# Patient Record
Sex: MALE | Race: WHITE | NOT HISPANIC OR LATINO | Employment: OTHER | URBAN - METROPOLITAN AREA
[De-identification: names, ages, dates, MRNs, and addresses within clinical notes are randomized per-mention and may not be internally consistent; named-entity substitution may affect disease eponyms.]

---

## 2017-03-15 ENCOUNTER — GENERIC CONVERSION - ENCOUNTER (OUTPATIENT)
Dept: OTHER | Facility: OTHER | Age: 47
End: 2017-03-15

## 2017-06-28 ENCOUNTER — APPOINTMENT (EMERGENCY)
Dept: RADIOLOGY | Facility: HOSPITAL | Age: 47
DRG: 192 | End: 2017-06-28
Payer: COMMERCIAL

## 2017-06-28 ENCOUNTER — GENERIC CONVERSION - ENCOUNTER (OUTPATIENT)
Dept: OTHER | Facility: OTHER | Age: 47
End: 2017-06-28

## 2017-06-28 ENCOUNTER — HOSPITAL ENCOUNTER (INPATIENT)
Facility: HOSPITAL | Age: 47
LOS: 1 days | Discharge: LEFT BEFORE DISCHARGE | DRG: 192 | End: 2017-06-29
Attending: EMERGENCY MEDICINE | Admitting: INTERNAL MEDICINE
Payer: COMMERCIAL

## 2017-06-28 DIAGNOSIS — R07.9 CHEST PAIN: Primary | ICD-10-CM

## 2017-06-28 DIAGNOSIS — I50.9 CHF (CONGESTIVE HEART FAILURE) (HCC): ICD-10-CM

## 2017-06-28 PROBLEM — F10.10 ALCOHOL ABUSE: Status: ACTIVE | Noted: 2017-06-28

## 2017-06-28 PROBLEM — I10 HTN (HYPERTENSION): Status: ACTIVE | Noted: 2017-06-28

## 2017-06-28 PROBLEM — Z72.0 TOBACCO ABUSE: Status: ACTIVE | Noted: 2017-06-28

## 2017-06-28 LAB
ALBUMIN SERPL BCP-MCNC: 3.7 G/DL (ref 3.5–5)
ALP SERPL-CCNC: 62 U/L (ref 46–116)
ALT SERPL W P-5'-P-CCNC: 18 U/L (ref 12–78)
ANION GAP SERPL CALCULATED.3IONS-SCNC: 7 MMOL/L (ref 4–13)
ANION GAP SERPL CALCULATED.3IONS-SCNC: 7 MMOL/L (ref 4–13)
AST SERPL W P-5'-P-CCNC: 18 U/L (ref 5–45)
BASOPHILS # BLD AUTO: 0.03 THOUSANDS/ΜL (ref 0–0.1)
BASOPHILS NFR BLD AUTO: 0 % (ref 0–1)
BILIRUB SERPL-MCNC: 0.83 MG/DL (ref 0.2–1)
BUN SERPL-MCNC: 14 MG/DL (ref 5–25)
BUN SERPL-MCNC: 15 MG/DL (ref 5–25)
CALCIUM SERPL-MCNC: 8.9 MG/DL (ref 8.3–10.1)
CALCIUM SERPL-MCNC: 9 MG/DL (ref 8.3–10.1)
CHLORIDE SERPL-SCNC: 103 MMOL/L (ref 100–108)
CHLORIDE SERPL-SCNC: 104 MMOL/L (ref 100–108)
CHOLEST SERPL-MCNC: 184 MG/DL (ref 50–200)
CO2 SERPL-SCNC: 26 MMOL/L (ref 21–32)
CO2 SERPL-SCNC: 26 MMOL/L (ref 21–32)
CREAT SERPL-MCNC: 1.03 MG/DL (ref 0.6–1.3)
CREAT SERPL-MCNC: 1.17 MG/DL (ref 0.6–1.3)
EOSINOPHIL # BLD AUTO: 0.12 THOUSAND/ΜL (ref 0–0.61)
EOSINOPHIL NFR BLD AUTO: 1 % (ref 0–6)
ERYTHROCYTE [DISTWIDTH] IN BLOOD BY AUTOMATED COUNT: 14.2 % (ref 11.6–15.1)
ERYTHROCYTE [DISTWIDTH] IN BLOOD BY AUTOMATED COUNT: 14.2 % (ref 11.6–15.1)
GFR SERPL CREATININE-BSD FRML MDRD: >60 ML/MIN/1.73SQ M
GFR SERPL CREATININE-BSD FRML MDRD: >60 ML/MIN/1.73SQ M
GLUCOSE SERPL-MCNC: 148 MG/DL (ref 65–140)
GLUCOSE SERPL-MCNC: 89 MG/DL (ref 65–140)
HCT VFR BLD AUTO: 43.5 % (ref 36.5–49.3)
HCT VFR BLD AUTO: 47 % (ref 36.5–49.3)
HDLC SERPL-MCNC: 54 MG/DL (ref 40–60)
HGB BLD-MCNC: 15 G/DL (ref 12–17)
HGB BLD-MCNC: 16.4 G/DL (ref 12–17)
HOLD SPECIMEN: NORMAL
LDLC SERPL CALC-MCNC: 104 MG/DL (ref 0–100)
LYMPHOCYTES # BLD AUTO: 2.18 THOUSANDS/ΜL (ref 0.6–4.47)
LYMPHOCYTES NFR BLD AUTO: 25 % (ref 14–44)
MAGNESIUM SERPL-MCNC: 2 MG/DL (ref 1.6–2.6)
MCH RBC QN AUTO: 31.5 PG (ref 26.8–34.3)
MCH RBC QN AUTO: 31.5 PG (ref 26.8–34.3)
MCHC RBC AUTO-ENTMCNC: 34.5 G/DL (ref 31.4–37.4)
MCHC RBC AUTO-ENTMCNC: 34.9 G/DL (ref 31.4–37.4)
MCV RBC AUTO: 90 FL (ref 82–98)
MCV RBC AUTO: 91 FL (ref 82–98)
MONOCYTES # BLD AUTO: 0.85 THOUSAND/ΜL (ref 0.17–1.22)
MONOCYTES NFR BLD AUTO: 10 % (ref 4–12)
NEUTROPHILS # BLD AUTO: 5.6 THOUSANDS/ΜL (ref 1.85–7.62)
NEUTS SEG NFR BLD AUTO: 64 % (ref 43–75)
NRBC BLD AUTO-RTO: 0 /100 WBCS
NT-PROBNP SERPL-MCNC: 3463 PG/ML
PLATELET # BLD AUTO: 286 THOUSANDS/UL (ref 149–390)
PLATELET # BLD AUTO: 312 THOUSANDS/UL (ref 149–390)
PMV BLD AUTO: 10.2 FL (ref 8.9–12.7)
PMV BLD AUTO: 10.4 FL (ref 8.9–12.7)
POTASSIUM SERPL-SCNC: 3.7 MMOL/L (ref 3.5–5.3)
POTASSIUM SERPL-SCNC: 3.8 MMOL/L (ref 3.5–5.3)
PROT SERPL-MCNC: 7.1 G/DL (ref 6.4–8.2)
RBC # BLD AUTO: 4.76 MILLION/UL (ref 3.88–5.62)
RBC # BLD AUTO: 5.2 MILLION/UL (ref 3.88–5.62)
SODIUM SERPL-SCNC: 136 MMOL/L (ref 136–145)
SODIUM SERPL-SCNC: 137 MMOL/L (ref 136–145)
SPECIMEN SOURCE: NORMAL
TRIGL SERPL-MCNC: 131 MG/DL
TROPONIN I BLD-MCNC: 0.02 NG/ML (ref 0–0.08)
TROPONIN I SERPL-MCNC: <0.02 NG/ML
TSH SERPL DL<=0.05 MIU/L-ACNC: 3.81 UIU/ML (ref 0.36–3.74)
WBC # BLD AUTO: 7.29 THOUSAND/UL (ref 4.31–10.16)
WBC # BLD AUTO: 8.81 THOUSAND/UL (ref 4.31–10.16)

## 2017-06-28 PROCEDURE — 94760 N-INVAS EAR/PLS OXIMETRY 1: CPT

## 2017-06-28 PROCEDURE — 80053 COMPREHEN METABOLIC PANEL: CPT | Performed by: EMERGENCY MEDICINE

## 2017-06-28 PROCEDURE — 85025 COMPLETE CBC W/AUTO DIFF WBC: CPT | Performed by: EMERGENCY MEDICINE

## 2017-06-28 PROCEDURE — 94660 CPAP INITIATION&MGMT: CPT

## 2017-06-28 PROCEDURE — 83880 ASSAY OF NATRIURETIC PEPTIDE: CPT | Performed by: EMERGENCY MEDICINE

## 2017-06-28 PROCEDURE — 80048 BASIC METABOLIC PNL TOTAL CA: CPT | Performed by: INTERNAL MEDICINE

## 2017-06-28 PROCEDURE — 84484 ASSAY OF TROPONIN QUANT: CPT | Performed by: INTERNAL MEDICINE

## 2017-06-28 PROCEDURE — 80061 LIPID PANEL: CPT | Performed by: INTERNAL MEDICINE

## 2017-06-28 PROCEDURE — 36415 COLL VENOUS BLD VENIPUNCTURE: CPT | Performed by: INTERNAL MEDICINE

## 2017-06-28 PROCEDURE — 71020 HB CHEST X-RAY 2VW FRONTAL&LATL: CPT

## 2017-06-28 PROCEDURE — 84484 ASSAY OF TROPONIN QUANT: CPT

## 2017-06-28 PROCEDURE — 93005 ELECTROCARDIOGRAM TRACING: CPT | Performed by: EMERGENCY MEDICINE

## 2017-06-28 PROCEDURE — 83735 ASSAY OF MAGNESIUM: CPT | Performed by: INTERNAL MEDICINE

## 2017-06-28 PROCEDURE — 85027 COMPLETE CBC AUTOMATED: CPT | Performed by: INTERNAL MEDICINE

## 2017-06-28 PROCEDURE — 36415 COLL VENOUS BLD VENIPUNCTURE: CPT

## 2017-06-28 PROCEDURE — 99285 EMERGENCY DEPT VISIT HI MDM: CPT

## 2017-06-28 PROCEDURE — 84443 ASSAY THYROID STIM HORMONE: CPT | Performed by: INTERNAL MEDICINE

## 2017-06-28 RX ORDER — FUROSEMIDE 40 MG/1
40 TABLET ORAL 2 TIMES DAILY
COMMUNITY
End: 2020-10-03 | Stop reason: HOSPADM

## 2017-06-28 RX ORDER — CALCIUM CARBONATE 200(500)MG
1000 TABLET,CHEWABLE ORAL DAILY PRN
Status: DISCONTINUED | OUTPATIENT
Start: 2017-06-28 | End: 2017-06-29 | Stop reason: HOSPADM

## 2017-06-28 RX ORDER — ASPIRIN 81 MG/1
81 TABLET, CHEWABLE ORAL DAILY
Status: DISCONTINUED | OUTPATIENT
Start: 2017-06-29 | End: 2017-06-29 | Stop reason: HOSPADM

## 2017-06-28 RX ORDER — THIAMINE MONONITRATE (VIT B1) 100 MG
100 TABLET ORAL DAILY
Status: DISCONTINUED | OUTPATIENT
Start: 2017-06-29 | End: 2017-06-29 | Stop reason: HOSPADM

## 2017-06-28 RX ORDER — KETOROLAC TROMETHAMINE 30 MG/ML
15 INJECTION, SOLUTION INTRAMUSCULAR; INTRAVENOUS ONCE
Status: DISCONTINUED | OUTPATIENT
Start: 2017-06-28 | End: 2017-06-28

## 2017-06-28 RX ORDER — ALBUTEROL SULFATE 90 UG/1
2 AEROSOL, METERED RESPIRATORY (INHALATION) EVERY 4 HOURS PRN
Status: DISCONTINUED | OUTPATIENT
Start: 2017-06-28 | End: 2017-06-29 | Stop reason: HOSPADM

## 2017-06-28 RX ORDER — FLUTICASONE PROPIONATE 110 UG/1
1 AEROSOL, METERED RESPIRATORY (INHALATION)
Status: DISCONTINUED | OUTPATIENT
Start: 2017-06-28 | End: 2017-06-29 | Stop reason: HOSPADM

## 2017-06-28 RX ORDER — LORAZEPAM 0.5 MG/1
0.5 TABLET ORAL EVERY 6 HOURS PRN
Status: DISCONTINUED | OUTPATIENT
Start: 2017-06-28 | End: 2017-06-29 | Stop reason: HOSPADM

## 2017-06-28 RX ORDER — ACETAMINOPHEN 325 MG/1
650 TABLET ORAL ONCE
Status: COMPLETED | OUTPATIENT
Start: 2017-06-28 | End: 2017-06-28

## 2017-06-28 RX ORDER — FOLIC ACID 1 MG/1
1 TABLET ORAL DAILY
Status: DISCONTINUED | OUTPATIENT
Start: 2017-06-29 | End: 2017-06-29 | Stop reason: HOSPADM

## 2017-06-28 RX ORDER — ACETAMINOPHEN 325 MG/1
650 TABLET ORAL EVERY 6 HOURS PRN
Status: DISCONTINUED | OUTPATIENT
Start: 2017-06-28 | End: 2017-06-29 | Stop reason: HOSPADM

## 2017-06-28 RX ORDER — DOCUSATE SODIUM 100 MG/1
100 CAPSULE, LIQUID FILLED ORAL 2 TIMES DAILY PRN
Status: DISCONTINUED | OUTPATIENT
Start: 2017-06-28 | End: 2017-06-29 | Stop reason: HOSPADM

## 2017-06-28 RX ORDER — METOPROLOL SUCCINATE 25 MG/1
25 TABLET, EXTENDED RELEASE ORAL DAILY
Status: DISCONTINUED | OUTPATIENT
Start: 2017-06-29 | End: 2017-06-29 | Stop reason: HOSPADM

## 2017-06-28 RX ORDER — FUROSEMIDE 40 MG/1
40 TABLET ORAL DAILY
Status: DISCONTINUED | OUTPATIENT
Start: 2017-06-29 | End: 2017-06-29

## 2017-06-28 RX ORDER — ONDANSETRON 2 MG/ML
4 INJECTION INTRAMUSCULAR; INTRAVENOUS EVERY 6 HOURS PRN
Status: DISCONTINUED | OUTPATIENT
Start: 2017-06-28 | End: 2017-06-29 | Stop reason: HOSPADM

## 2017-06-28 RX ORDER — METOPROLOL SUCCINATE 25 MG/1
25 TABLET, EXTENDED RELEASE ORAL DAILY
Status: ON HOLD | COMMUNITY
End: 2017-08-11

## 2017-06-28 RX ORDER — ASPIRIN 81 MG/1
325 TABLET, CHEWABLE ORAL ONCE
Status: DISCONTINUED | OUTPATIENT
Start: 2017-06-28 | End: 2017-06-28

## 2017-06-28 RX ORDER — ASPIRIN 81 MG/1
81 TABLET, CHEWABLE ORAL DAILY
COMMUNITY
End: 2018-03-19 | Stop reason: SDUPTHER

## 2017-06-28 RX ADMIN — FLUTICASONE PROPIONATE 1 PUFF: 110 AEROSOL, METERED RESPIRATORY (INHALATION) at 21:55

## 2017-06-28 RX ADMIN — ACETAMINOPHEN 650 MG: 325 TABLET, FILM COATED ORAL at 15:54

## 2017-06-29 ENCOUNTER — GENERIC CONVERSION - ENCOUNTER (OUTPATIENT)
Dept: OTHER | Facility: OTHER | Age: 47
End: 2017-06-29

## 2017-06-29 ENCOUNTER — APPOINTMENT (INPATIENT)
Dept: NON INVASIVE DIAGNOSTICS | Facility: HOSPITAL | Age: 47
DRG: 192 | End: 2017-06-29
Payer: COMMERCIAL

## 2017-06-29 ENCOUNTER — APPOINTMENT (INPATIENT)
Dept: RADIOLOGY | Facility: HOSPITAL | Age: 47
DRG: 192 | End: 2017-06-29
Payer: COMMERCIAL

## 2017-06-29 VITALS
BODY MASS INDEX: 30.3 KG/M2 | WEIGHT: 228.62 LBS | SYSTOLIC BLOOD PRESSURE: 100 MMHG | OXYGEN SATURATION: 94 % | DIASTOLIC BLOOD PRESSURE: 60 MMHG | TEMPERATURE: 97.6 F | HEIGHT: 73 IN | HEART RATE: 72 BPM | RESPIRATION RATE: 18 BRPM

## 2017-06-29 PROBLEM — R07.9 CHEST PAIN: Status: RESOLVED | Noted: 2017-06-28 | Resolved: 2017-06-29

## 2017-06-29 LAB
ALBUMIN SERPL BCP-MCNC: 3.2 G/DL (ref 3.5–5)
ALP SERPL-CCNC: 53 U/L (ref 46–116)
ALT SERPL W P-5'-P-CCNC: 17 U/L (ref 12–78)
ANION GAP SERPL CALCULATED.3IONS-SCNC: 6 MMOL/L (ref 4–13)
AST SERPL W P-5'-P-CCNC: 16 U/L (ref 5–45)
ATRIAL RATE: 76 BPM
BILIRUB SERPL-MCNC: 0.56 MG/DL (ref 0.2–1)
BUN SERPL-MCNC: 15 MG/DL (ref 5–25)
CALCIUM SERPL-MCNC: 9 MG/DL (ref 8.3–10.1)
CHLORIDE SERPL-SCNC: 107 MMOL/L (ref 100–108)
CO2 SERPL-SCNC: 25 MMOL/L (ref 21–32)
CREAT SERPL-MCNC: 0.9 MG/DL (ref 0.6–1.3)
ERYTHROCYTE [DISTWIDTH] IN BLOOD BY AUTOMATED COUNT: 14.5 % (ref 11.6–15.1)
EST. AVERAGE GLUCOSE BLD GHB EST-MCNC: 111 MG/DL
GFR SERPL CREATININE-BSD FRML MDRD: >60 ML/MIN/1.73SQ M
GLUCOSE SERPL-MCNC: 100 MG/DL (ref 65–140)
HBA1C MFR BLD: 5.5 % (ref 4.2–6.3)
HCT VFR BLD AUTO: 43.6 % (ref 36.5–49.3)
HGB BLD-MCNC: 15.2 G/DL (ref 12–17)
MAGNESIUM SERPL-MCNC: 2.1 MG/DL (ref 1.6–2.6)
MCH RBC QN AUTO: 32 PG (ref 26.8–34.3)
MCHC RBC AUTO-ENTMCNC: 34.9 G/DL (ref 31.4–37.4)
MCV RBC AUTO: 92 FL (ref 82–98)
P AXIS: 62 DEGREES
PLATELET # BLD AUTO: 290 THOUSANDS/UL (ref 149–390)
PMV BLD AUTO: 11.3 FL (ref 8.9–12.7)
POTASSIUM SERPL-SCNC: 4.2 MMOL/L (ref 3.5–5.3)
PR INTERVAL: 170 MS
PROT SERPL-MCNC: 6.3 G/DL (ref 6.4–8.2)
QRS AXIS: 58 DEGREES
QRSD INTERVAL: 164 MS
QT INTERVAL: 442 MS
QTC INTERVAL: 497 MS
RBC # BLD AUTO: 4.75 MILLION/UL (ref 3.88–5.62)
SODIUM SERPL-SCNC: 138 MMOL/L (ref 136–145)
T WAVE AXIS: 198 DEGREES
VENTRICULAR RATE: 76 BPM
WBC # BLD AUTO: 7.1 THOUSAND/UL (ref 4.31–10.16)

## 2017-06-29 PROCEDURE — 80053 COMPREHEN METABOLIC PANEL: CPT | Performed by: INTERNAL MEDICINE

## 2017-06-29 PROCEDURE — 76705 ECHO EXAM OF ABDOMEN: CPT

## 2017-06-29 PROCEDURE — B2111ZZ FLUOROSCOPY OF MULTIPLE CORONARY ARTERIES USING LOW OSMOLAR CONTRAST: ICD-10-PCS | Performed by: INTERNAL MEDICINE

## 2017-06-29 PROCEDURE — C1894 INTRO/SHEATH, NON-LASER: HCPCS | Performed by: INTERNAL MEDICINE

## 2017-06-29 PROCEDURE — 83036 HEMOGLOBIN GLYCOSYLATED A1C: CPT | Performed by: INTERNAL MEDICINE

## 2017-06-29 PROCEDURE — C1769 GUIDE WIRE: HCPCS | Performed by: INTERNAL MEDICINE

## 2017-06-29 PROCEDURE — 85027 COMPLETE CBC AUTOMATED: CPT | Performed by: INTERNAL MEDICINE

## 2017-06-29 PROCEDURE — 4A023N7 MEASUREMENT OF CARDIAC SAMPLING AND PRESSURE, LEFT HEART, PERCUTANEOUS APPROACH: ICD-10-PCS | Performed by: INTERNAL MEDICINE

## 2017-06-29 PROCEDURE — 83735 ASSAY OF MAGNESIUM: CPT | Performed by: INTERNAL MEDICINE

## 2017-06-29 PROCEDURE — 93458 L HRT ARTERY/VENTRICLE ANGIO: CPT | Performed by: INTERNAL MEDICINE

## 2017-06-29 PROCEDURE — 99152 MOD SED SAME PHYS/QHP 5/>YRS: CPT | Performed by: INTERNAL MEDICINE

## 2017-06-29 RX ORDER — HEPARIN SODIUM 1000 [USP'U]/ML
INJECTION, SOLUTION INTRAVENOUS; SUBCUTANEOUS CODE/TRAUMA/SEDATION MEDICATION
Status: COMPLETED | OUTPATIENT
Start: 2017-06-29 | End: 2017-06-29

## 2017-06-29 RX ORDER — LIDOCAINE HYDROCHLORIDE 10 MG/ML
INJECTION, SOLUTION INFILTRATION; PERINEURAL CODE/TRAUMA/SEDATION MEDICATION
Status: COMPLETED | OUTPATIENT
Start: 2017-06-29 | End: 2017-06-29

## 2017-06-29 RX ORDER — SODIUM CHLORIDE 9 MG/ML
100 INJECTION, SOLUTION INTRAVENOUS CONTINUOUS
Status: DISCONTINUED | OUTPATIENT
Start: 2017-06-29 | End: 2017-06-29 | Stop reason: HOSPADM

## 2017-06-29 RX ORDER — VERAPAMIL HYDROCHLORIDE 2.5 MG/ML
INJECTION, SOLUTION INTRAVENOUS CODE/TRAUMA/SEDATION MEDICATION
Status: COMPLETED | OUTPATIENT
Start: 2017-06-29 | End: 2017-06-29

## 2017-06-29 RX ORDER — FUROSEMIDE 10 MG/ML
40 INJECTION INTRAMUSCULAR; INTRAVENOUS
Status: DISCONTINUED | OUTPATIENT
Start: 2017-06-29 | End: 2017-06-29

## 2017-06-29 RX ORDER — MIDAZOLAM HYDROCHLORIDE 1 MG/ML
INJECTION INTRAMUSCULAR; INTRAVENOUS CODE/TRAUMA/SEDATION MEDICATION
Status: COMPLETED | OUTPATIENT
Start: 2017-06-29 | End: 2017-06-29

## 2017-06-29 RX ORDER — NITROGLYCERIN 20 MG/100ML
INJECTION INTRAVENOUS CODE/TRAUMA/SEDATION MEDICATION
Status: COMPLETED | OUTPATIENT
Start: 2017-06-29 | End: 2017-06-29

## 2017-06-29 RX ORDER — FENTANYL CITRATE 50 UG/ML
INJECTION, SOLUTION INTRAMUSCULAR; INTRAVENOUS CODE/TRAUMA/SEDATION MEDICATION
Status: COMPLETED | OUTPATIENT
Start: 2017-06-29 | End: 2017-06-29

## 2017-06-29 RX ORDER — FUROSEMIDE 10 MG/ML
40 INJECTION INTRAMUSCULAR; INTRAVENOUS
Status: DISCONTINUED | OUTPATIENT
Start: 2017-06-29 | End: 2017-06-29 | Stop reason: HOSPADM

## 2017-06-29 RX ADMIN — HEPARIN SODIUM 4000 UNITS: 1000 INJECTION INTRAVENOUS; SUBCUTANEOUS at 11:44

## 2017-06-29 RX ADMIN — NITROGLYCERIN 200 MCG: 20 INJECTION INTRAVENOUS at 11:44

## 2017-06-29 RX ADMIN — TIOTROPIUM BROMIDE 18 MCG: 18 CAPSULE ORAL; RESPIRATORY (INHALATION) at 08:46

## 2017-06-29 RX ADMIN — FENTANYL CITRATE 50 MCG: 50 INJECTION, SOLUTION INTRAMUSCULAR; INTRAVENOUS at 11:51

## 2017-06-29 RX ADMIN — Medication 100 MG: at 08:46

## 2017-06-29 RX ADMIN — FOLIC ACID 1 MG: 1 TABLET ORAL at 08:44

## 2017-06-29 RX ADMIN — NITROGLYCERIN 200 MCG: 20 INJECTION INTRAVENOUS at 11:51

## 2017-06-29 RX ADMIN — ENOXAPARIN SODIUM 40 MG: 40 INJECTION SUBCUTANEOUS at 08:45

## 2017-06-29 RX ADMIN — FUROSEMIDE 40 MG: 40 TABLET ORAL at 08:44

## 2017-06-29 RX ADMIN — ZINC 1 TABLET: TAB ORAL at 08:46

## 2017-06-29 RX ADMIN — SODIUM CHLORIDE 100 ML/HR: 0.9 INJECTION, SOLUTION INTRAVENOUS at 12:16

## 2017-06-29 RX ADMIN — ASPIRIN 81 MG 81 MG: 81 TABLET ORAL at 08:44

## 2017-06-29 RX ADMIN — LIDOCAINE HYDROCHLORIDE 1 ML: 10 INJECTION, SOLUTION INFILTRATION; PERINEURAL at 11:40

## 2017-06-29 RX ADMIN — MIDAZOLAM 2 MG: 1 INJECTION INTRAMUSCULAR; INTRAVENOUS at 11:56

## 2017-06-29 RX ADMIN — FLUTICASONE PROPIONATE 1 PUFF: 110 AEROSOL, METERED RESPIRATORY (INHALATION) at 08:05

## 2017-06-29 RX ADMIN — IOHEXOL 60 ML: 350 INJECTION, SOLUTION INTRAVENOUS at 11:57

## 2017-06-29 RX ADMIN — MIDAZOLAM 2 MG: 1 INJECTION INTRAMUSCULAR; INTRAVENOUS at 11:37

## 2017-06-29 RX ADMIN — METOPROLOL SUCCINATE 25 MG: 25 TABLET, EXTENDED RELEASE ORAL at 08:47

## 2017-06-29 RX ADMIN — FENTANYL CITRATE 50 MCG: 50 INJECTION, SOLUTION INTRAMUSCULAR; INTRAVENOUS at 11:37

## 2017-06-29 RX ADMIN — VERAPAMIL HYDROCHLORIDE 2.5 MG: 2.5 INJECTION, SOLUTION INTRAVENOUS at 11:44

## 2017-08-09 ENCOUNTER — ALLSCRIPTS OFFICE VISIT (OUTPATIENT)
Dept: OTHER | Facility: OTHER | Age: 47
End: 2017-08-09

## 2017-08-09 DIAGNOSIS — I50.22 CHRONIC SYSTOLIC CONGESTIVE HEART FAILURE (HCC): ICD-10-CM

## 2017-08-09 DIAGNOSIS — I42.0 DILATED CARDIOMYOPATHY (HCC): ICD-10-CM

## 2017-08-10 ENCOUNTER — TELEPHONE (OUTPATIENT)
Dept: INPATIENT UNIT | Facility: HOSPITAL | Age: 47
End: 2017-08-10

## 2017-08-10 PROBLEM — I42.0 CARDIOMYOPATHY, DILATED (HCC): Chronic | Status: ACTIVE | Noted: 2017-08-10

## 2017-08-10 RX ORDER — SODIUM CHLORIDE 9 MG/ML
50 INJECTION, SOLUTION INTRAVENOUS CONTINUOUS
Status: CANCELLED | OUTPATIENT
Start: 2017-08-10

## 2017-08-11 ENCOUNTER — HOSPITAL ENCOUNTER (OUTPATIENT)
Dept: NON INVASIVE DIAGNOSTICS | Facility: HOSPITAL | Age: 47
Discharge: HOME | End: 2017-08-11
Attending: INTERNAL MEDICINE | Admitting: INTERNAL MEDICINE
Payer: COMMERCIAL

## 2017-08-11 ENCOUNTER — GENERIC CONVERSION - ENCOUNTER (OUTPATIENT)
Dept: OTHER | Facility: OTHER | Age: 47
End: 2017-08-11

## 2017-08-11 VITALS
RESPIRATION RATE: 18 BRPM | HEART RATE: 66 BPM | OXYGEN SATURATION: 99 % | WEIGHT: 235 LBS | HEIGHT: 73 IN | TEMPERATURE: 98.6 F | BODY MASS INDEX: 31.14 KG/M2 | SYSTOLIC BLOOD PRESSURE: 118 MMHG | DIASTOLIC BLOOD PRESSURE: 77 MMHG

## 2017-08-11 DIAGNOSIS — I50.22 CHRONIC SYSTOLIC CONGESTIVE HEART FAILURE (HCC): ICD-10-CM

## 2017-08-11 DIAGNOSIS — I42.0 DILATED CARDIOMYOPATHY (HCC): ICD-10-CM

## 2017-08-11 LAB
ANION GAP SERPL CALCULATED.3IONS-SCNC: 6 MMOL/L (ref 4–13)
BUN SERPL-MCNC: 19 MG/DL (ref 5–25)
CALCIUM SERPL-MCNC: 8.7 MG/DL (ref 8.3–10.1)
CHLORIDE SERPL-SCNC: 108 MMOL/L (ref 100–108)
CO2 SERPL-SCNC: 26 MMOL/L (ref 21–32)
CREAT SERPL-MCNC: 1.01 MG/DL (ref 0.6–1.3)
ERYTHROCYTE [DISTWIDTH] IN BLOOD BY AUTOMATED COUNT: 13.5 % (ref 11.6–15.1)
GFR SERPL CREATININE-BSD FRML MDRD: 87 ML/MIN/1.73SQ M
GLUCOSE P FAST SERPL-MCNC: 96 MG/DL (ref 65–99)
GLUCOSE SERPL-MCNC: 96 MG/DL (ref 65–140)
HCT VFR BLD AUTO: 43.5 % (ref 36.5–49.3)
HGB BLD-MCNC: 15.4 G/DL (ref 12–17)
INR PPP: 0.97 (ref 0.86–1.16)
MAGNESIUM SERPL-MCNC: 2.1 MG/DL (ref 1.6–2.6)
MCH RBC QN AUTO: 32.2 PG (ref 26.8–34.3)
MCHC RBC AUTO-ENTMCNC: 35.4 G/DL (ref 31.4–37.4)
MCV RBC AUTO: 91 FL (ref 82–98)
PLATELET # BLD AUTO: 244 THOUSANDS/UL (ref 149–390)
PMV BLD AUTO: 9.9 FL (ref 8.9–12.7)
POTASSIUM SERPL-SCNC: 4 MMOL/L (ref 3.5–5.3)
PROTHROMBIN TIME: 12.9 SECONDS (ref 12.1–14.4)
RBC # BLD AUTO: 4.79 MILLION/UL (ref 3.88–5.62)
SODIUM SERPL-SCNC: 140 MMOL/L (ref 136–145)
WBC # BLD AUTO: 7.33 THOUSAND/UL (ref 4.31–10.16)

## 2017-08-11 PROCEDURE — 93451 RIGHT HEART CATH: CPT | Performed by: INTERNAL MEDICINE

## 2017-08-11 PROCEDURE — C1894 INTRO/SHEATH, NON-LASER: HCPCS | Performed by: INTERNAL MEDICINE

## 2017-08-11 PROCEDURE — C1769 GUIDE WIRE: HCPCS | Performed by: INTERNAL MEDICINE

## 2017-08-11 PROCEDURE — 82810 BLOOD GASES O2 SAT ONLY: CPT | Performed by: INTERNAL MEDICINE

## 2017-08-11 PROCEDURE — 85610 PROTHROMBIN TIME: CPT | Performed by: INTERNAL MEDICINE

## 2017-08-11 PROCEDURE — 83735 ASSAY OF MAGNESIUM: CPT | Performed by: INTERNAL MEDICINE

## 2017-08-11 PROCEDURE — 80048 BASIC METABOLIC PNL TOTAL CA: CPT | Performed by: INTERNAL MEDICINE

## 2017-08-11 PROCEDURE — 85027 COMPLETE CBC AUTOMATED: CPT | Performed by: INTERNAL MEDICINE

## 2017-08-11 RX ORDER — SODIUM CHLORIDE 9 MG/ML
50 INJECTION, SOLUTION INTRAVENOUS CONTINUOUS
Status: DISCONTINUED | OUTPATIENT
Start: 2017-08-11 | End: 2017-08-11 | Stop reason: HOSPADM

## 2017-08-11 RX ORDER — LIDOCAINE HYDROCHLORIDE 10 MG/ML
INJECTION, SOLUTION INFILTRATION; PERINEURAL CODE/TRAUMA/SEDATION MEDICATION
Status: COMPLETED | OUTPATIENT
Start: 2017-08-11 | End: 2017-08-11

## 2017-08-11 RX ORDER — NITROGLYCERIN 0.4 MG/1
0.4 TABLET SUBLINGUAL
COMMUNITY
End: 2020-09-30 | Stop reason: SDUPTHER

## 2017-08-11 RX ORDER — LISINOPRIL 5 MG/1
5 TABLET ORAL 2 TIMES DAILY
COMMUNITY
End: 2018-02-09 | Stop reason: ALTCHOICE

## 2017-08-11 RX ORDER — CARVEDILOL 6.25 MG/1
6.25 TABLET ORAL 2 TIMES DAILY WITH MEALS
COMMUNITY
End: 2019-06-24 | Stop reason: SDUPTHER

## 2017-08-11 RX ORDER — BUSPIRONE HYDROCHLORIDE 10 MG/1
20 TABLET ORAL
COMMUNITY
End: 2019-10-25 | Stop reason: ALTCHOICE

## 2017-08-11 RX ADMIN — LIDOCAINE HYDROCHLORIDE 5 ML: 10 INJECTION, SOLUTION INFILTRATION; PERINEURAL at 08:50

## 2017-09-22 ENCOUNTER — GENERIC CONVERSION - ENCOUNTER (OUTPATIENT)
Dept: OTHER | Facility: OTHER | Age: 47
End: 2017-09-22

## 2017-09-22 ENCOUNTER — ALLSCRIPTS OFFICE VISIT (OUTPATIENT)
Dept: OTHER | Facility: OTHER | Age: 47
End: 2017-09-22

## 2017-09-26 ENCOUNTER — TRANSCRIBE ORDERS (OUTPATIENT)
Dept: ADMINISTRATIVE | Facility: HOSPITAL | Age: 47
End: 2017-09-26

## 2017-09-26 DIAGNOSIS — I42.0 DILATED CARDIOMYOPATHY (HCC): ICD-10-CM

## 2017-09-26 DIAGNOSIS — I50.22 CHRONIC SYSTOLIC CONGESTIVE HEART FAILURE (HCC): ICD-10-CM

## 2017-09-26 DIAGNOSIS — I44.7 LEFT BUNDLE-BRANCH BLOCK: ICD-10-CM

## 2017-09-26 DIAGNOSIS — I50.22 CHRONIC SYSTOLIC HEART FAILURE (HCC): Primary | ICD-10-CM

## 2017-09-27 ENCOUNTER — HOSPITAL ENCOUNTER (OUTPATIENT)
Dept: NON INVASIVE DIAGNOSTICS | Facility: CLINIC | Age: 47
Discharge: HOME | End: 2017-09-27
Payer: COMMERCIAL

## 2017-09-27 DIAGNOSIS — I50.22 CHRONIC SYSTOLIC HEART FAILURE (HCC): ICD-10-CM

## 2017-09-27 PROCEDURE — 93306 TTE W/DOPPLER COMPLETE: CPT

## 2017-10-04 ENCOUNTER — ANESTHESIA EVENT (OUTPATIENT)
Dept: SURGERY | Facility: HOSPITAL | Age: 47
End: 2017-10-04
Payer: COMMERCIAL

## 2017-10-04 ENCOUNTER — GENERIC CONVERSION - ENCOUNTER (OUTPATIENT)
Dept: OTHER | Facility: OTHER | Age: 47
End: 2017-10-04

## 2017-10-04 ENCOUNTER — ANESTHESIA (OUTPATIENT)
Dept: SURGERY | Facility: HOSPITAL | Age: 47
End: 2017-10-04
Payer: COMMERCIAL

## 2017-10-04 ENCOUNTER — APPOINTMENT (OUTPATIENT)
Dept: RADIOLOGY | Facility: HOSPITAL | Age: 47
End: 2017-10-04
Payer: COMMERCIAL

## 2017-10-04 ENCOUNTER — HOSPITAL ENCOUNTER (OUTPATIENT)
Dept: NON INVASIVE DIAGNOSTICS | Facility: HOSPITAL | Age: 47
Discharge: HOME | End: 2017-10-05
Attending: INTERNAL MEDICINE | Admitting: INTERNAL MEDICINE
Payer: COMMERCIAL

## 2017-10-04 DIAGNOSIS — I50.22 CHRONIC SYSTOLIC CONGESTIVE HEART FAILURE (HCC): ICD-10-CM

## 2017-10-04 DIAGNOSIS — I42.0 DILATED CARDIOMYOPATHY (HCC): ICD-10-CM

## 2017-10-04 DIAGNOSIS — I44.7 LEFT BUNDLE-BRANCH BLOCK: ICD-10-CM

## 2017-10-04 LAB
ANION GAP SERPL CALCULATED.3IONS-SCNC: 6 MMOL/L (ref 4–13)
ANION GAP SERPL CALCULATED.3IONS-SCNC: 7 MMOL/L (ref 4–13)
ATRIAL RATE: 64 BPM
ATRIAL RATE: 82 BPM
BASOPHILS # BLD AUTO: 0.03 THOUSANDS/ΜL (ref 0–0.1)
BASOPHILS NFR BLD AUTO: 0 % (ref 0–1)
BUN SERPL-MCNC: 19 MG/DL (ref 5–25)
BUN SERPL-MCNC: 20 MG/DL (ref 5–25)
CALCIUM SERPL-MCNC: 8.7 MG/DL (ref 8.3–10.1)
CALCIUM SERPL-MCNC: 8.8 MG/DL (ref 8.3–10.1)
CHLORIDE SERPL-SCNC: 104 MMOL/L (ref 100–108)
CHLORIDE SERPL-SCNC: 106 MMOL/L (ref 100–108)
CO2 SERPL-SCNC: 26 MMOL/L (ref 21–32)
CO2 SERPL-SCNC: 26 MMOL/L (ref 21–32)
CREAT SERPL-MCNC: 1.01 MG/DL (ref 0.6–1.3)
CREAT SERPL-MCNC: 1.07 MG/DL (ref 0.6–1.3)
EOSINOPHIL # BLD AUTO: 0.18 THOUSAND/ΜL (ref 0–0.61)
EOSINOPHIL NFR BLD AUTO: 2 % (ref 0–6)
ERYTHROCYTE [DISTWIDTH] IN BLOOD BY AUTOMATED COUNT: 14 % (ref 11.6–15.1)
GFR SERPL CREATININE-BSD FRML MDRD: 81 ML/MIN/1.73SQ M
GFR SERPL CREATININE-BSD FRML MDRD: 87 ML/MIN/1.73SQ M
GLUCOSE P FAST SERPL-MCNC: 98 MG/DL (ref 65–99)
GLUCOSE SERPL-MCNC: 127 MG/DL (ref 65–140)
GLUCOSE SERPL-MCNC: 98 MG/DL (ref 65–140)
HCT VFR BLD AUTO: 42.9 % (ref 36.5–49.3)
HGB BLD-MCNC: 15.5 G/DL (ref 12–17)
LYMPHOCYTES # BLD AUTO: 2.02 THOUSANDS/ΜL (ref 0.6–4.47)
LYMPHOCYTES NFR BLD AUTO: 23 % (ref 14–44)
MAGNESIUM SERPL-MCNC: 2 MG/DL (ref 1.6–2.6)
MAGNESIUM SERPL-MCNC: 2.2 MG/DL (ref 1.6–2.6)
MCH RBC QN AUTO: 31.8 PG (ref 26.8–34.3)
MCHC RBC AUTO-ENTMCNC: 36.1 G/DL (ref 31.4–37.4)
MCV RBC AUTO: 88 FL (ref 82–98)
MONOCYTES # BLD AUTO: 0.73 THOUSAND/ΜL (ref 0.17–1.22)
MONOCYTES NFR BLD AUTO: 8 % (ref 4–12)
NEUTROPHILS # BLD AUTO: 5.99 THOUSANDS/ΜL (ref 1.85–7.62)
NEUTS SEG NFR BLD AUTO: 67 % (ref 43–75)
NRBC BLD AUTO-RTO: 0 /100 WBCS
P AXIS: 56 DEGREES
P AXIS: 57 DEGREES
PLATELET # BLD AUTO: 301 THOUSANDS/UL (ref 149–390)
PMV BLD AUTO: 10.2 FL (ref 8.9–12.7)
POTASSIUM SERPL-SCNC: 3.8 MMOL/L (ref 3.5–5.3)
POTASSIUM SERPL-SCNC: 3.9 MMOL/L (ref 3.5–5.3)
PR INTERVAL: 158 MS
PR INTERVAL: 172 MS
QRS AXIS: -40 DEGREES
QRS AXIS: 42 DEGREES
QRSD INTERVAL: 156 MS
QRSD INTERVAL: 174 MS
QT INTERVAL: 490 MS
QT INTERVAL: 568 MS
QTC INTERVAL: 572 MS
QTC INTERVAL: 585 MS
RBC # BLD AUTO: 4.87 MILLION/UL (ref 3.88–5.62)
SODIUM SERPL-SCNC: 137 MMOL/L (ref 136–145)
SODIUM SERPL-SCNC: 138 MMOL/L (ref 136–145)
T WAVE AXIS: 199 DEGREES
T WAVE AXIS: 58 DEGREES
VENTRICULAR RATE: 64 BPM
VENTRICULAR RATE: 82 BPM
WBC # BLD AUTO: 8.97 THOUSAND/UL (ref 4.31–10.16)

## 2017-10-04 PROCEDURE — C1730 CATH, EP, 19 OR FEW ELECT: HCPCS | Performed by: INTERNAL MEDICINE

## 2017-10-04 PROCEDURE — C1887 CATHETER, GUIDING: HCPCS | Performed by: INTERNAL MEDICINE

## 2017-10-04 PROCEDURE — 71010 HB CHEST X-RAY 1 VIEW FRONTAL (PORTABLE): CPT

## 2017-10-04 PROCEDURE — 85025 COMPLETE CBC W/AUTO DIFF WBC: CPT | Performed by: PHYSICIAN ASSISTANT

## 2017-10-04 PROCEDURE — 93005 ELECTROCARDIOGRAM TRACING: CPT | Performed by: PHYSICIAN ASSISTANT

## 2017-10-04 PROCEDURE — 80048 BASIC METABOLIC PNL TOTAL CA: CPT | Performed by: INTERNAL MEDICINE

## 2017-10-04 PROCEDURE — C1882 AICD, OTHER THAN SING/DUAL: HCPCS

## 2017-10-04 PROCEDURE — 33225 L VENTRIC PACING LEAD ADD-ON: CPT | Performed by: INTERNAL MEDICINE

## 2017-10-04 PROCEDURE — C1892 INTRO/SHEATH,FIXED,PEEL-AWAY: HCPCS | Performed by: INTERNAL MEDICINE

## 2017-10-04 PROCEDURE — C1769 GUIDE WIRE: HCPCS | Performed by: INTERNAL MEDICINE

## 2017-10-04 PROCEDURE — 80048 BASIC METABOLIC PNL TOTAL CA: CPT | Performed by: PHYSICIAN ASSISTANT

## 2017-10-04 PROCEDURE — 83735 ASSAY OF MAGNESIUM: CPT | Performed by: INTERNAL MEDICINE

## 2017-10-04 PROCEDURE — 33249 INSJ/RPLCMT DEFIB W/LEAD(S): CPT | Performed by: INTERNAL MEDICINE

## 2017-10-04 PROCEDURE — C1898 LEAD, PMKR, OTHER THAN TRANS: HCPCS

## 2017-10-04 PROCEDURE — C1900 LEAD, CORONARY VENOUS: HCPCS

## 2017-10-04 PROCEDURE — C1777 LEAD, AICD, ENDO SINGLE COIL: HCPCS

## 2017-10-04 PROCEDURE — 83735 ASSAY OF MAGNESIUM: CPT | Performed by: PHYSICIAN ASSISTANT

## 2017-10-04 RX ORDER — CARVEDILOL 6.25 MG/1
6.25 TABLET ORAL 2 TIMES DAILY WITH MEALS
Status: DISCONTINUED | OUTPATIENT
Start: 2017-10-04 | End: 2017-10-05 | Stop reason: HOSPADM

## 2017-10-04 RX ORDER — FLUTICASONE PROPIONATE 110 UG/1
1 AEROSOL, METERED RESPIRATORY (INHALATION)
Status: DISCONTINUED | OUTPATIENT
Start: 2017-10-04 | End: 2017-10-05 | Stop reason: HOSPADM

## 2017-10-04 RX ORDER — FENTANYL CITRATE 50 UG/ML
INJECTION, SOLUTION INTRAMUSCULAR; INTRAVENOUS AS NEEDED
Status: DISCONTINUED | OUTPATIENT
Start: 2017-10-04 | End: 2017-10-04 | Stop reason: SURG

## 2017-10-04 RX ORDER — SODIUM CHLORIDE 9 MG/ML
INJECTION, SOLUTION INTRAVENOUS CONTINUOUS PRN
Status: DISCONTINUED | OUTPATIENT
Start: 2017-10-04 | End: 2017-10-04 | Stop reason: SURG

## 2017-10-04 RX ORDER — LISINOPRIL 5 MG/1
5 TABLET ORAL 2 TIMES DAILY
Status: DISCONTINUED | OUTPATIENT
Start: 2017-10-04 | End: 2017-10-05 | Stop reason: HOSPADM

## 2017-10-04 RX ORDER — MIDAZOLAM HYDROCHLORIDE 1 MG/ML
INJECTION INTRAMUSCULAR; INTRAVENOUS AS NEEDED
Status: DISCONTINUED | OUTPATIENT
Start: 2017-10-04 | End: 2017-10-04 | Stop reason: SURG

## 2017-10-04 RX ORDER — FUROSEMIDE 40 MG/1
40 TABLET ORAL DAILY
Status: DISCONTINUED | OUTPATIENT
Start: 2017-10-04 | End: 2017-10-05 | Stop reason: HOSPADM

## 2017-10-04 RX ORDER — NITROGLYCERIN 0.4 MG/1
0.4 TABLET SUBLINGUAL
Status: DISCONTINUED | OUTPATIENT
Start: 2017-10-04 | End: 2017-10-05 | Stop reason: HOSPADM

## 2017-10-04 RX ORDER — ASPIRIN 81 MG/1
81 TABLET, CHEWABLE ORAL DAILY
Status: DISCONTINUED | OUTPATIENT
Start: 2017-10-04 | End: 2017-10-05 | Stop reason: HOSPADM

## 2017-10-04 RX ORDER — OXYCODONE HYDROCHLORIDE 5 MG/1
5 TABLET ORAL EVERY 4 HOURS PRN
Status: DISCONTINUED | OUTPATIENT
Start: 2017-10-04 | End: 2017-10-05 | Stop reason: HOSPADM

## 2017-10-04 RX ORDER — ACETAMINOPHEN 325 MG/1
650 TABLET ORAL EVERY 4 HOURS PRN
Status: DISCONTINUED | OUTPATIENT
Start: 2017-10-04 | End: 2017-10-05 | Stop reason: HOSPADM

## 2017-10-04 RX ORDER — PROPOFOL 10 MG/ML
INJECTION, EMULSION INTRAVENOUS CONTINUOUS PRN
Status: DISCONTINUED | OUTPATIENT
Start: 2017-10-04 | End: 2017-10-04 | Stop reason: SURG

## 2017-10-04 RX ORDER — BUSPIRONE HYDROCHLORIDE 10 MG/1
20 TABLET ORAL
Status: DISCONTINUED | OUTPATIENT
Start: 2017-10-04 | End: 2017-10-05 | Stop reason: HOSPADM

## 2017-10-04 RX ORDER — CEFAZOLIN SODIUM 1 G/3ML
INJECTION, POWDER, FOR SOLUTION INTRAMUSCULAR; INTRAVENOUS AS NEEDED
Status: DISCONTINUED | OUTPATIENT
Start: 2017-10-04 | End: 2017-10-04 | Stop reason: SURG

## 2017-10-04 RX ORDER — LIDOCAINE HYDROCHLORIDE 10 MG/ML
INJECTION, SOLUTION INFILTRATION; PERINEURAL CODE/TRAUMA/SEDATION MEDICATION
Status: COMPLETED | OUTPATIENT
Start: 2017-10-04 | End: 2017-10-04

## 2017-10-04 RX ORDER — KETAMINE HYDROCHLORIDE 50 MG/ML
INJECTION, SOLUTION, CONCENTRATE INTRAMUSCULAR; INTRAVENOUS AS NEEDED
Status: DISCONTINUED | OUTPATIENT
Start: 2017-10-04 | End: 2017-10-04 | Stop reason: SURG

## 2017-10-04 RX ADMIN — CEFAZOLIN 2000 MG: 1 INJECTION, POWDER, FOR SOLUTION INTRAVENOUS at 09:20

## 2017-10-04 RX ADMIN — KETAMINE HYDROCHLORIDE 20 MG: 50 INJECTION, SOLUTION INTRAMUSCULAR; INTRAVENOUS at 09:40

## 2017-10-04 RX ADMIN — BUSPIRONE HYDROCHLORIDE 20 MG: 10 TABLET ORAL at 17:16

## 2017-10-04 RX ADMIN — FLUTICASONE PROPIONATE 1 PUFF: 110 AEROSOL, METERED RESPIRATORY (INHALATION) at 21:03

## 2017-10-04 RX ADMIN — MIDAZOLAM HYDROCHLORIDE 2 MG: 1 INJECTION, SOLUTION INTRAMUSCULAR; INTRAVENOUS at 09:05

## 2017-10-04 RX ADMIN — KETAMINE HYDROCHLORIDE 20 MG: 50 INJECTION, SOLUTION INTRAMUSCULAR; INTRAVENOUS at 09:35

## 2017-10-04 RX ADMIN — CARVEDILOL 6.25 MG: 6.25 TABLET, FILM COATED ORAL at 17:15

## 2017-10-04 RX ADMIN — FENTANYL CITRATE 50 MCG: 50 INJECTION, SOLUTION INTRAMUSCULAR; INTRAVENOUS at 11:00

## 2017-10-04 RX ADMIN — LISINOPRIL 5 MG: 5 TABLET ORAL at 17:15

## 2017-10-04 RX ADMIN — PROPOFOL 60 MCG/KG/MIN: 10 INJECTION, EMULSION INTRAVENOUS at 09:11

## 2017-10-04 RX ADMIN — KETAMINE HYDROCHLORIDE 0.4 MG/KG/HR: 50 INJECTION, SOLUTION INTRAMUSCULAR; INTRAVENOUS at 09:11

## 2017-10-04 RX ADMIN — OXYCODONE HYDROCHLORIDE 5 MG: 5 TABLET ORAL at 17:55

## 2017-10-04 RX ADMIN — IOHEXOL 10 ML: 350 INJECTION, SOLUTION INTRAVENOUS at 09:28

## 2017-10-04 RX ADMIN — SODIUM CHLORIDE: 0.9 INJECTION, SOLUTION INTRAVENOUS at 09:05

## 2017-10-04 RX ADMIN — FUROSEMIDE 40 MG: 40 TABLET ORAL at 17:16

## 2017-10-04 RX ADMIN — LIDOCAINE HYDROCHLORIDE 20 ML: 10 INJECTION, SOLUTION INFILTRATION; PERINEURAL at 09:36

## 2017-10-04 RX ADMIN — TIOTROPIUM BROMIDE 18 MCG: 18 CAPSULE ORAL; RESPIRATORY (INHALATION) at 17:15

## 2017-10-04 RX ADMIN — LIDOCAINE HYDROCHLORIDE 5 ML: 10 INJECTION, SOLUTION INFILTRATION; PERINEURAL at 09:40

## 2017-10-04 NOTE — ANESTHESIA PREPROCEDURE EVALUATION
Review of Systems/Medical History      No history of anesthetic complications     Cardiovascular  Exercise tolerance: poor,  Hypertension controlled, No angina , CHF , HARO,   Comment: SUMMARY     LEFT VENTRICLE:  The ventricle was moderately to markedly dilated  Systolic function was severely reduced by visual assessment  Ejection fraction was estimated in the range of 10 % to 15 %  There was severe diffuse hypokinesis  Doppler parameters were consistent with abnormal left ventricular relaxation (grade 1 diastolic dysfunction)    Doppler parameters were consistent with high ventricular filling pressure      MITRAL VALVE:  There was mild regurgitation,  Pulmonary  Smoker cigarette smoker 5-10 packs per year , Tobacco cessation counseling given, No recent URI , ,        GI/Hepatic    No GERD ,             Endo/Other     GYN       Hematology   Musculoskeletal       Neurology   Psychology           Physical Exam    Airway    Mallampati score: II  TM Distance: >3 FB  Neck ROM: full     Dental   upper dentures,     Cardiovascular      Pulmonary      Other Findings        Anesthesia Plan  ASA Score- 4       Anesthesia Type- IV sedation with anesthesia        Induction- intravenous      Informed Consent  Anesthetic plan and risks discussed with patient

## 2017-10-04 NOTE — DISCHARGE INSTRUCTIONS
for more information about heart medicines  · Take your medicine as directed  Contact your healthcare provider if you think your medicine is not helping or if you have side effects  Tell him if you are allergic to any medicine  Keep a list of the medicines, vitamins, and herbs you take  Include the amounts, and when and why you take them  Bring the list or the pill bottles to follow-up visits  Carry your medicine list with you in case of an emergency  Follow up with your healthcare provider as directed: You will need to return to have your pacemaker checked  You may also need an EKG, echocardiogram, or chest x-ray to check the leads in your heart, and your doctor will order these tests if necessary  Write down your questions so you remember to ask them during your visits  Device safety: Some electrical devices may interfere with how your pacemaker works  Some examples are cell phones, security systems, power cables, and electric monitors  Ask your healthcare provider how long you can be near these devices, and which devices to avoid  Tell caregivers you have a pacemaker before you have a procedure or surgery  Wound care: Care for your wound as directed  Keep incision dry for one week  Do not use lotions/powders/creams on incision  Remove outer bandage 48 hours after implantation  Leave underlying steri-strips in place, they will either fall off on their own or will be removed at 2 week follow up appointment  No overhead reaching/pushing/pulling/lifting greater than 5-10lbs with left arm for one month  Please call the office if you notice redness, swelling, bleeding, or drainage from incision or if you develop fevers      Contact your healthcare provider if:   · You have new or increased swelling in your legs or feet  · You feel more tired than usual    · You have trouble breathing during activity  · Your wound is red, warm, swollen, or draining pus  · You have a fever     · You have questions or concerns about your condition or care  Seek care immediately or call 911 if:   · You feel like your heart is fluttering or jumping  · You have any of the following signs of a heart attack:    ¨ Squeezing, pressure, fullness, or pain in your chest that lasts longer than a few minutes or returns   ¨ Discomfort or pain in your back, neck, jaw, stomach, or arm   ¨ Shortness of breath or breathing problems   ¨ A sudden cold sweat, lightheadedness, dizziness, or nausea, especially with chest pain or trouble breathing      Implantable Cardioverter Defibrillator (ICD)    If you have any questions, please call [phone] to speak with a nurse (8:30am-4pm, or [phone] after hours)  For appointments, please call [phone]  WHAT YOU SHOULD KNOW:    Your device is a biventricular ICD, which delivers resynchronization therapy (see above) and is also a defibrillator  An implantable cardioverter defibrillator (ICD) is a small device that monitors your heart rate and rhythm  It is commonly placed inside your upper chest region  It may be used if you have a ventricular arrhythmia, which is an irregular, dangerous rhythm from the bottom chamber of your heart  Some arrhythmias may cause your heart to suddenly stop beating  An ICD can give a shock to your heart to make it start beating again, or it can give pacing therapy (also known as pain-free therapy) to return your heart to normal rhythm  It is also a pacemaker, so it will pace your heart if needed to prevent it from beating too slowly            AFTER YOU LEAVE:      Follow up with your primary healthcare provider or cardiologist as directed: You will need to follow-up to have your ICD checked and make sure you are not having problems  Write down your questions so you remember to ask them during your visits  Self-care:   · Ask about activity: Ask if you need to avoid moving your shoulder or arm, and for how long  Ask if you should avoid lifting heavy objects  Do not play any contact sports, such as football or wrestling, until your primary healthcare provider [facility] or cardiologist tells you it is okay  You may only be able to drive for a certain amount of time per day, or during certain hours  Ask when you can return to work  · Care for your skin over the ICD: Ask your PHP or cardiologist when it is okay for you to bathe  Do not put any lotion or powder on the incision area  Do not rub or wear tight clothing over the ICD area until your PHP or cardiologist tells you it is okay  When you get a shock from your ICD: A shock may feel like someone has hit you, or you may feel a thump in the chest  If someone is touching you when you get a shock, they may feel a tingling feeling  The first time you feel a shock, it may scare you  Sit or lie down and stay calm  Ask someone to stay with you if possible  Please either call your cardiologist or report to an emergency room  Safety instructions when you have an ICD:   · Carry an ID card for the ICD: This card has important information about your ICD  · Stay away from magnets or machines with electric fields: This includes MRI machines  Avoid leaning into a car engine or doing welding  These things can interfere with how your ICD works  · Tell airport security you have an ICD: You may need to be searched by hand when you go through a security gate  The security gate or handheld wand could harm your ICD  · Keep an ICD diary: Record when you get a shock and what you were doing before you got the shock  Keep track of how you felt before and after the shock, as well as how many shocks you received  Write down the day and time of each shock  Bring the diary with you when you see your PHP or cardiologist    · Carry medical alert identification: Wear medical alert jewelry or carry a card that says you have an ICD  Ask your PHP or cardiologist where to get these items      Contact your primary healthcare provider or cardiologist if:   · You have a fever  · You feel 1 or more shocks from your ICD and feel fine afterwards  · Your feet or ankles swell  · The area around your ICD is painful or tender after surgery  · The skin around your stitches or staples is red, swollen, or draining pus or fluid  · You have chills, a cough, and feel weak or achy  · You are sad or anxious and find it hard to do your usual activities  · You have questions or concerns about your condition or care  Seek care immediately or call 911 if:   · Your stitches or staples come apart  · Blood soaks through your bandage  · You feel more than 3 shocks in a row from your ICD  · You become weak, dizzy, or faint  · You feel your heart skip beats or beat very fast or slow, but you do not feel a shock from your ICD  · You have chest pain that does not go away with rest or medicine  © 2014 [employer] [employer] is for End User's use only and may not be sold, redistributed or otherwise used for commercial purposes  All illustrations and images included in CareNotes® are the copyrighted property of [employer]  or [employer]  The above information is an  only  It is not intended as medical advice for individual conditions or treatments  Talk to your doctor, nurse or pharmacist before following any medical regimen to see if it is safe and effective for you

## 2017-10-04 NOTE — ANESTHESIA POSTPROCEDURE EVALUATION
Post-Op Assessment Note      CV Status:  Stable    Mental Status:  Alert and awake    Hydration Status:  Euvolemic    PONV Controlled:  Controlled    Airway Patency:  Patent    Post Op Vitals Reviewed: Yes          Staff: CRNA, Anesthesiologist           BP   141/78   Temp      Pulse  82   Resp   16   SpO2   93

## 2017-10-04 NOTE — PROGRESS NOTES
Per cardiology, monitor the telemetry for now, she will place an order for a BMP and a magnesium right now

## 2017-10-05 ENCOUNTER — GENERIC CONVERSION - ENCOUNTER (OUTPATIENT)
Dept: OTHER | Facility: OTHER | Age: 47
End: 2017-10-05

## 2017-10-05 ENCOUNTER — APPOINTMENT (OUTPATIENT)
Dept: RADIOLOGY | Facility: HOSPITAL | Age: 47
End: 2017-10-05
Attending: INTERNAL MEDICINE
Payer: COMMERCIAL

## 2017-10-05 VITALS
BODY MASS INDEX: 31.14 KG/M2 | OXYGEN SATURATION: 95 % | SYSTOLIC BLOOD PRESSURE: 123 MMHG | WEIGHT: 235 LBS | TEMPERATURE: 98.2 F | DIASTOLIC BLOOD PRESSURE: 72 MMHG | HEIGHT: 73 IN | RESPIRATION RATE: 18 BRPM | HEART RATE: 80 BPM

## 2017-10-05 LAB
ANION GAP SERPL CALCULATED.3IONS-SCNC: 6 MMOL/L (ref 4–13)
BUN SERPL-MCNC: 18 MG/DL (ref 5–25)
CALCIUM SERPL-MCNC: 8.7 MG/DL (ref 8.3–10.1)
CHLORIDE SERPL-SCNC: 105 MMOL/L (ref 100–108)
CO2 SERPL-SCNC: 26 MMOL/L (ref 21–32)
CREAT SERPL-MCNC: 0.96 MG/DL (ref 0.6–1.3)
ERYTHROCYTE [DISTWIDTH] IN BLOOD BY AUTOMATED COUNT: 14.1 % (ref 11.6–15.1)
GFR SERPL CREATININE-BSD FRML MDRD: 92 ML/MIN/1.73SQ M
GLUCOSE SERPL-MCNC: 98 MG/DL (ref 65–140)
HCT VFR BLD AUTO: 40.9 % (ref 36.5–49.3)
HGB BLD-MCNC: 14.6 G/DL (ref 12–17)
MCH RBC QN AUTO: 31.8 PG (ref 26.8–34.3)
MCHC RBC AUTO-ENTMCNC: 35.7 G/DL (ref 31.4–37.4)
MCV RBC AUTO: 89 FL (ref 82–98)
PLATELET # BLD AUTO: 235 THOUSANDS/UL (ref 149–390)
PMV BLD AUTO: 9.9 FL (ref 8.9–12.7)
POTASSIUM SERPL-SCNC: 3.9 MMOL/L (ref 3.5–5.3)
RBC # BLD AUTO: 4.59 MILLION/UL (ref 3.88–5.62)
SODIUM SERPL-SCNC: 137 MMOL/L (ref 136–145)
WBC # BLD AUTO: 9.28 THOUSAND/UL (ref 4.31–10.16)

## 2017-10-05 PROCEDURE — 71020 HB CHEST X-RAY 2VW FRONTAL&LATL: CPT

## 2017-10-05 PROCEDURE — 80048 BASIC METABOLIC PNL TOTAL CA: CPT | Performed by: PHYSICIAN ASSISTANT

## 2017-10-05 PROCEDURE — 85027 COMPLETE CBC AUTOMATED: CPT | Performed by: PHYSICIAN ASSISTANT

## 2017-10-05 RX ADMIN — OXYCODONE HYDROCHLORIDE 5 MG: 5 TABLET ORAL at 03:27

## 2017-10-05 RX ADMIN — CARVEDILOL 6.25 MG: 6.25 TABLET, FILM COATED ORAL at 07:21

## 2017-10-05 NOTE — DISCHARGE SUMMARY
Patient was seen and examined this morning after being monitored overnight  Device site is stable  Patient has no complaints of chest pain or shortness of breath  Chest x-ray was officially read  Leads are in proper position without pneumothorax  Device check showed normal device function  Patient's labs and vital signs are stable  He will be discharged home  Post device instructions were reviewed with the patient in detail placed in discharge paperwork    He will have a 2 week site check in our office as scheduled

## 2017-10-05 NOTE — PROGRESS NOTES
Pt reported that he has self admin'd AM medications which he had brought in  Medication names & dosages reviewed with patient and match what is in [facility]  Pt is irritable and requesting to leave prior to discharge order being placed b/c "someone told [him] that he is good to go"  Pt has been verbally abusive to staff when AM vitals were being taken

## 2017-10-05 NOTE — CASE MANAGEMENT
10/04/17 1010  Outpatient No Charge Bed Once     Transfer Service: Cardiology       Question Answer Comment   Admitting Physician TOBIN PINEDA    Bed request comments telemetry            Cardiac BiV ICD Implant    /72   Pulse 80   Temp 98 2 °F (36 8 °C) (Oral)   Resp 18   Ht 6' 1" (1 854 m)   Wt 107 kg (235 lb)   SpO2 95%   BMI 31 00 kg/m²     Scheduled Meds:  aspirin 81 mg Oral Daily   busPIRone 20 mg Oral BID (diuretic)   carvedilol 6 25 mg Oral BID With Meals   cefazolin 2,000 mg Intravenous Once   fluticasone 1 puff Inhalation BID   furosemide 40 mg Oral Daily   lisinopril 5 mg Oral BID   tiotropium 18 mcg Inhalation Daily     Continuous Infusions:   PRN Meds:   acetaminophen    nitroglycerin    oxyCODONE

## 2017-10-05 NOTE — PLAN OF CARE
Problem: Potential for Falls  Goal: Patient will remain free of falls  INTERVENTIONS:  - Assess patient frequently for physical needs  -  Identify cognitive and physical deficits and behaviors that affect risk of falls    -  [place] fall precautions as indicated by assessment   - Educate patient/family on patient safety including physical limitations  - Instruct patient to call for assistance with activity based on assessment  - Modify environment to reduce risk of injury  - Consider OT/PT consult to assist with strengthening/mobility   Outcome: Progressing      Problem: PAIN - ADULT  Goal: Verbalizes/displays adequate comfort level or baseline comfort level  Interventions:  - Encourage patient to monitor pain and request assistance  - Assess pain using appropriate pain scale  - Administer analgesics based on type and severity of pain and evaluate response  - Implement non-pharmacological measures as appropriate and evaluate response  - Consider cultural and social influences on pain and pain management  - Notify physician/advanced practitioner if interventions unsuccessful or patient reports new pain  Outcome: Progressing      Problem: INFECTION - ADULT  Goal: Absence or prevention of progression during hospitalization  INTERVENTIONS:  - Assess and monitor for signs and symptoms of infection  - Monitor lab/diagnostic results  - Monitor all insertion sites, i e  indwelling lines, tubes, and drains  - Monitor endotracheal (as able) and nasal secretions for changes in amount and color  - [place] appropriate cooling/warming therapies per order  - Administer medications as ordered  - Instruct and encourage patient and family to use good hand hygiene technique  - Identify and instruct in appropriate isolation precautions for identified infection/condition  Outcome: Progressing      Problem: DISCHARGE PLANNING  Goal: Discharge to home or other facility with appropriate resources  INTERVENTIONS:  - Identify barriers to discharge w/patient and caregiver  - Arrange for needed discharge resources and transportation as appropriate  - Identify discharge learning needs (meds, wound care, etc )  - Arrange for interpretive services to assist at discharge as needed  - Refer to Case Management Department for coordinating discharge planning if the patient needs post-hospital services based on physician/advanced practitioner order or complex needs related to functional status, cognitive ability, or social support system  Outcome: Progressing      Problem: CARDIOVASCULAR - ADULT  Goal: Maintains optimal cardiac output and hemodynamic stability  INTERVENTIONS:  - Monitor I/O, vital signs and rhythm  - Monitor for S/S and trends of decreased cardiac output i e  bleeding, hypotension  - Administer and titrate ordered vasoactive medications to optimize hemodynamic stability  - Assess quality of pulses, skin color and temperature  - Assess for signs of decreased coronary artery perfusion - ex   Angina  - Instruct patient to report change in severity of symptoms  Outcome: Progressing    Goal: Absence of cardiac dysrhythmias or at baseline rhythm  INTERVENTIONS:  - Continuous cardiac monitoring, monitor vital signs, obtain 12 lead EKG if indicated  - Administer antiarrhythmic and heart rate control medications as ordered  - Monitor electrolytes and administer replacement therapy as ordered  Outcome: Progressing

## 2017-10-20 ENCOUNTER — ALLSCRIPTS OFFICE VISIT (OUTPATIENT)
Dept: OTHER | Facility: OTHER | Age: 47
End: 2017-10-20

## 2017-10-27 ENCOUNTER — GENERIC CONVERSION - ENCOUNTER (OUTPATIENT)
Dept: OTHER | Facility: OTHER | Age: 47
End: 2017-10-27

## 2017-10-27 ENCOUNTER — TRANSCRIBE ORDERS (OUTPATIENT)
Dept: ADMINISTRATIVE | Facility: HOSPITAL | Age: 47
End: 2017-10-27

## 2017-10-27 DIAGNOSIS — I44.7 COMPLETE LEFT BUNDLE BRANCH BLOCK: Primary | ICD-10-CM

## 2018-01-04 ENCOUNTER — GENERIC CONVERSION - ENCOUNTER (OUTPATIENT)
Dept: OTHER | Facility: OTHER | Age: 48
End: 2018-01-04

## 2018-01-04 ENCOUNTER — ALLSCRIPTS OFFICE VISIT (OUTPATIENT)
Dept: OTHER | Facility: OTHER | Age: 48
End: 2018-01-04

## 2018-01-08 ENCOUNTER — HOSPITAL ENCOUNTER (OUTPATIENT)
Dept: NON INVASIVE DIAGNOSTICS | Facility: CLINIC | Age: 48
Discharge: HOME | End: 2018-01-08
Payer: COMMERCIAL

## 2018-01-08 ENCOUNTER — GENERIC CONVERSION - ENCOUNTER (OUTPATIENT)
Dept: CARDIOLOGY CLINIC | Facility: CLINIC | Age: 48
End: 2018-01-08

## 2018-01-08 DIAGNOSIS — I44.7 COMPLETE LEFT BUNDLE BRANCH BLOCK: ICD-10-CM

## 2018-01-08 PROCEDURE — 93306 TTE W/DOPPLER COMPLETE: CPT

## 2018-01-09 NOTE — PROCEDURES
Procedures by Danielle Simmons DO at 8/11/2017   9:48 AM      Author:  Danielle Simmons DO Service:  Heart Failure Author Type:  Physician    Filed:  8/11/2017  9:50 AM Date of Service:  8/11/2017  9:48 AM Status:  Signed    :  Danielle Simmons DO (Physician)        Pre-procedure Diagnoses:       1  Chronic systolic heart failure [Z85 84]                Post-procedure Diagnoses:       1  Chronic systolic heart failure [I27 28]                Procedures:       1  OR RIGHT HEART CATH O2 SATURATION & CARDIAC OUTPUT [84501 (CPT®)]                 Patient was prepped in sterile fashion  His right IJ area was draped  Right IJ access was obtained via seldinger technique using a micropuncture as an additional step for safety  An 8 French sheath was placed  The PA catheter was advanced via pressure wave form monitoring and as needed fluoroscopy to obtain RA, RV, PA and wedge pressures  No complications during the procedure  PA saturation was obtained for [facility] Cardiac Output calculation      RA 12  PA 35/25  PCW 24    PA sat 67%  CO 4 3  CI 1 9               Received for:Miguel Ann DO  Aug 11 2017  9:50AM [facility] Standard Time

## 2018-01-14 VITALS
SYSTOLIC BLOOD PRESSURE: 118 MMHG | DIASTOLIC BLOOD PRESSURE: 88 MMHG | WEIGHT: 235.56 LBS | HEART RATE: 85 BPM | HEIGHT: 73 IN | BODY MASS INDEX: 31.22 KG/M2

## 2018-01-22 VITALS
DIASTOLIC BLOOD PRESSURE: 70 MMHG | OXYGEN SATURATION: 96 % | WEIGHT: 242 LBS | BODY MASS INDEX: 32.07 KG/M2 | SYSTOLIC BLOOD PRESSURE: 102 MMHG | HEART RATE: 85 BPM | HEIGHT: 73 IN

## 2018-01-22 VITALS
HEIGHT: 73 IN | DIASTOLIC BLOOD PRESSURE: 76 MMHG | OXYGEN SATURATION: 98 % | BODY MASS INDEX: 31.75 KG/M2 | WEIGHT: 239.56 LBS | SYSTOLIC BLOOD PRESSURE: 120 MMHG | HEART RATE: 92 BPM

## 2018-02-09 ENCOUNTER — OFFICE VISIT (OUTPATIENT)
Dept: CARDIOLOGY CLINIC | Facility: CLINIC | Age: 48
End: 2018-02-09
Payer: COMMERCIAL

## 2018-02-09 VITALS
HEART RATE: 94 BPM | OXYGEN SATURATION: 98 % | BODY MASS INDEX: 34.06 KG/M2 | SYSTOLIC BLOOD PRESSURE: 126 MMHG | WEIGHT: 257 LBS | DIASTOLIC BLOOD PRESSURE: 88 MMHG | HEIGHT: 73 IN

## 2018-02-09 DIAGNOSIS — I10 HYPERTENSION, UNSPECIFIED TYPE: Primary | ICD-10-CM

## 2018-02-09 DIAGNOSIS — I50.22 CHRONIC SYSTOLIC CHF (CONGESTIVE HEART FAILURE), NYHA CLASS 2 (HCC): ICD-10-CM

## 2018-02-09 PROCEDURE — 93000 ELECTROCARDIOGRAM COMPLETE: CPT | Performed by: INTERNAL MEDICINE

## 2018-02-09 PROCEDURE — 99214 OFFICE O/P EST MOD 30 MIN: CPT | Performed by: INTERNAL MEDICINE

## 2018-02-09 NOTE — PATIENT INSTRUCTIONS
Please check daily weights and contact the heart failure program at [phone] if you gain 3 lb in one day or 5 lb in one week  Please limit daily sodium intake to 2000 mg daily  Please limit daily fluid intake to 2000 ml daily  Bring complete list of medications to your follow up appointment  Stop Lisinopril - don't take tonight  Start Entresto Sunday morning- you need 36 hour washout

## 2018-02-09 NOTE — PROGRESS NOTES
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%, Stage C, NYHA 3  Cath negative for obstructive CAD, LVEDP 30 mmhg  [place]:  2017 RHC; PA sat 67%, CI 1 9, PCW 25 mmHg  ICD implanted 10/4/17: Lasix 40 mg BID, lisinopril 5 mg BID, coreg 6 25 mg BID   2017 R[facility]; PA sat 67%, CI 1 9, PCW 25 mmHg  Weight last time 242 lbs, weight 258 today     2   Etoh abuse history- down to 8 beers weekly  3   tobacco use- continued  4  MARRY- cpap    TODAY'S PLAN:  Change to Entresto as NYHA 2 with EF < 35%, per ACC/ AHA guidelines  --2g sodium diet  - Daily weights    Neurohormonal Blockade:  --Beta-Blocker:coreg 6 25 mg BID  --ACEi, ARB or ARNi: lisinopril 5 mg BID  --Aldosterone Receptor Blocker:  --Diuretic: Lasix 40 mg BID    Sudden Cardiac Death Risk Reduction:  --ICD: 10/4/17- Jan 4 interrogation: 4 sec of VT/VR at 290 bpm- terminated on own    Electrical Resynchronization:  --Candidacy for BiV device: BiV placed in 10/4- 97 5% paced  Positive response: EF: to 25-30% and LVEDd down to 6 3 from 6 8 with BiV pacer    Advanced Therapies (If appropriate): --Inotrope:  --LVAD/Transplant Candidacy:    HPI:   Thang Amador is a 50y o  year old male with a history of a cardiomyopathy diagnosed at [facility] in March 2017 presents from his PCP's office with chest pain  He presented to [facility] in March with progressive shortness of breath and a cough  He was found to have an elevated BNP and echo revealed an dialated cardiomyopathy with EF: 10% LVEDD: 6 6cm; with mild MR and mild TR  He was diuresed with IV lasix and started on metoprolol succinate   There was reportedly no ischemic workup done during that admission  He was fitted for a lifevest at the time of discharge  He was occasionally compliant with his lifevest but reportedly was compliant with his medications  He had a follow up echo after 3 months however he is unsure of the results but was being considered for a pacemaker  He was referred to EP at [place] however did not continue under their care  He has been diagnosed with MARRY and is on CPAP  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  smokes 1ppd now but as much as 3ppd for the past 40 years  drinks extensively up to 1 case of beer plus hard liquor in a day  He has cut back since March  The last time he drank heavily was about a month ago  He was in the hospital in June with chest pain and cardiac cath was negative for obstructive CAD but LVEDP was elevated at 30 mmHg  He was discharged on LIfeVest  Does not want to wear  Since discharge he started feeling really bad again in the last 3 weeks again with a band sensation of chest pain  His appetite is poor and lost 37 lbs  Zero energy  Down to less than 10 cigarettes a day  Currently lives by himself  Grown adult children  Has a girlfriend that lives around 45 minutes ago  Â 10/27: RHC in Aug CI was 1 9 and PA sat 67% and wedge 24  He underwent BiV ICD placed Oct 4 ok  Drinking about 8 beers a week  Interval History: On follow up down to about 3-4 beers a week    Past Medical History:   Diagnosis Date    Cardiac disease        Review of Systems - Negative except no SOB, no pND, no edema  No syncope or near syncope  Fatigue    No Known Allergies        Current Outpatient Prescriptions:     aspirin 81 mg chewable tablet, Chew 81 mg daily, Disp: , Rfl:     busPIRone (BUSPAR) 10 mg tablet, Take 20 mg by mouth 2 (two) times a day, Disp: , Rfl:     carvedilol (COREG) 6 25 mg tablet, Take 6 25 mg by mouth 2 (two) times a day with meals, Disp: , Rfl:     Fluticasone Furoate (ARNUITY ELLIPTA) 100 MCG/ACT AEPB, Inhale 100 mcg, Disp: , Rfl:     furosemide (LASIX) 40 mg tablet, Take 40 mg by mouth 2 (two) times a day  , Disp: , Rfl:     lisinopril (ZESTRIL) 5 mg tablet, Take 5 mg by mouth 2 (two) times a day, Disp: , Rfl:     nitroglycerin (NITROSTAT) 0 4 mg SL tablet, Place 0 4 mg under the tongue every 5 (five) minutes as needed for chest pain, Disp: , Rfl:     Tiotropium Bromide Monohydrate (SPIRIVA RESPIMAT) 2 5 MCG/ACT AERS, Inhale 2 5 mcg, Disp: , Rfl:     Social History     Social History    Marital status: Single     Spouse name: N/A    Number of children: N/A    Years of education: N/A     Occupational History    Not on file  Social History Main Topics    Smoking status: Former Smoker    Smokeless tobacco: Not on file    Alcohol use Yes      Comment: socially    Drug use: No    Sexual activity: Not on file     Other Topics Concern    Not on file     Social History Narrative    No narrative on file       No family history on file  Physical Exam:    Vitals: Blood pressure 126/88, pulse 94, height 6' 1" (1 854 m), weight 117 kg (257 lb), SpO2 98 %  , Body mass index is 33 91 kg/m² ,   Wt Readings from Last 3 Encounters:   02/09/18 117 kg (257 lb)   10/27/17 110 kg (242 lb)   10/04/17 107 kg (235 lb)         Physical Exam:  Vitals:    02/09/18 1410   BP: 126/88   BP Location: Right arm   Patient Position: Sitting   Cuff Size: Large   Pulse: 94   SpO2: 98%   Weight: 117 kg (257 lb)   Height: 6' 1" (1 854 m)       GEN: Kylie De La Cruz appears well, alert and oriented x 3, pleasant and cooperative   HEENT: pupils equal, round, and reactive to light; extraocular muscles intact  NECK: supple, no carotid bruits   HEART: regular rhythm, normal S1 and S2, no murmurs, clicks, gallops or rubs, JVP is    LUNGS: clear to auscultation bilaterally; no wheezes, rales, or rhonchi   ABDOMEN: normal bowel sounds, soft, no tenderness, no distention  EXTREMITIES: peripheral pulses normal; no clubbing, cyanosis, or edema  NEURO: no focal findings   SKIN: normal without suspicious lesions on exposed skin    Labs & Results:    Lab Results   Component Value Date    GLUCOSE 98 10/05/2017    CALCIUM 8 7 10/05/2017     10/05/2017    K 3 9 10/05/2017    CO2 26 10/05/2017     10/05/2017    BUN 18 10/05/2017    CREATININE 0 96 10/05/2017     Lab Results   Component Value Date    WBC 9 28 10/05/2017    HGB 14 6 10/05/2017    HCT 40 9 10/05/2017    MCV 89 10/05/2017     10/05/2017     Lab Results   Component Value Date    NTBNP 3,463 (H) 06/28/2017      Lab Results   Component Value Date    CHOL 184 06/28/2017     Lab Results   Component Value Date    HDL 54 06/28/2017     Lab Results   Component Value Date    LDLCALC 104 (H) 06/28/2017     Lab Results   Component Value Date    TRIG 131 06/28/2017     No components found for: CHOLHDL    Echocardiogram 1/8/18  LVEF: 25-30%  LVIDd: 6 3 cm  RV:  MR:  PASP:  RVOT:   Other:    EKG personally reviewed by Dev Mathur DO  Counseling / Coordination of Care  Total floor / unit time spent today 25 minutes  Greater than 50% of total time was spent with the patient and / or family counseling and / or coordination of care  A description of the counseling / coordination of care: 15      Thank you for the opportunity to participate in the care of this patient    CORONA REYNA [address] ChuckJeff[facility] Hospital[facility] Robb

## 2018-02-15 DIAGNOSIS — I50.22 CHRONIC SYSTOLIC CHF (CONGESTIVE HEART FAILURE), NYHA CLASS 2 (HCC): ICD-10-CM

## 2018-02-19 ENCOUNTER — CLINICAL SUPPORT (OUTPATIENT)
Dept: CARDIOLOGY CLINIC | Facility: CLINIC | Age: 48
End: 2018-02-19
Payer: COMMERCIAL

## 2018-02-19 DIAGNOSIS — Z95.810 AICD (AUTOMATIC CARDIOVERTER/DEFIBRILLATOR) PRESENT: ICD-10-CM

## 2018-02-19 DIAGNOSIS — I42.0 CARDIOMYOPATHY, DILATED (HCC): Primary | ICD-10-CM

## 2018-02-19 DIAGNOSIS — I50.42 CHRONIC COMBINED SYSTOLIC AND DIASTOLIC CONGESTIVE HEART FAILURE (HCC): ICD-10-CM

## 2018-02-19 PROCEDURE — 93299 PR REM INTERROG ICPMS/SCRMS <30 D TECH REVIEW: CPT | Performed by: INTERNAL MEDICINE

## 2018-02-19 PROCEDURE — 93297 REM INTERROG DEV EVAL ICPMS: CPT | Performed by: INTERNAL MEDICINE

## 2018-02-20 ENCOUNTER — OFFICE VISIT (OUTPATIENT)
Dept: VASCULAR SURGERY | Facility: CLINIC | Age: 48
End: 2018-02-20
Payer: COMMERCIAL

## 2018-02-20 VITALS
WEIGHT: 256 LBS | DIASTOLIC BLOOD PRESSURE: 74 MMHG | SYSTOLIC BLOOD PRESSURE: 118 MMHG | BODY MASS INDEX: 33.78 KG/M2 | TEMPERATURE: 96.7 F | HEART RATE: 74 BPM

## 2018-02-20 DIAGNOSIS — I73.9 PERIPHERAL ARTERIAL DISEASE (HCC): Primary | ICD-10-CM

## 2018-02-20 DIAGNOSIS — Z72.0 TOBACCO ABUSE: ICD-10-CM

## 2018-02-20 PROCEDURE — 99244 OFF/OP CNSLTJ NEW/EST MOD 40: CPT | Performed by: PHYSICIAN ASSISTANT

## 2018-02-20 NOTE — PATIENT INSTRUCTIONS
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  *   Tobacco Addiction  *   Severe, Dilated, Non-ischemic cardiomyopathy  *   Bi-V ICD in situ  *   Coronary artery disease  *   LBBB  *   MARRY on CPAP     Mr Rosey Suarez is a 47M diagnosed with severe NICMP and reportedly mild coronary artery disease last year  He has a significant smoking history  In the past 8 months, he has developed wounds on the bottom of his toes  His feet are cool and he has decreased pulses in the feet  There is evidence of peripheral arterial disease  There is no definite, typical claudication symptoms  He has no known rheumatologic conditions  He can try Tylenol, if no other contraindication, for pain  We will check a Lower Extremity Arterial duplex as prior study seems incongruent with his examination  We had a discussion regarding smoking and the association with vascular disease and increased risk of heart attacks and strokes (as well as cancers)  He MUST stop smoking for his health and to slow progression of his current disease progress  He is going back to his Internal Medicine physician to discuss alternatives to Chantix since his insurance won't pay for that  He must also stop all alcohol with cardiomyopathy  Continue aspirin  If confirmed vascular disease, we should add statin therapy  Rule out rheumatologic cause (doubt)  Please bring in blood work from PCP  Follow up in one month or sooner if condition worsens, develop new sores on feet, the feet become discolored or increased pain  Peripheral Artery Disease   AMBULATORY CARE:   Peripheral artery disease (PAD)  is narrow, weak, or blocked arteries  It may affect any arteries outside of your heart and brain  PAD is usually the result of a buildup of fat and cholesterol, also called plaque, along your artery walls  Inflammation, a blood clot, or abnormal cell growth could also block your arteries   PAD prevents normal blood flow to your legs and arms  You are at risk of an amputation if poor blood flow keeps wounds from healing or causes gangrene (tissue death)  Without treatment, PAD can also cause a heart attack or stroke  Common symptoms include:  Mild PAD usually does not cause symptoms  As the disease worsens over time, you may have the following:  · Pain or cramps in your leg or hip while you walk     · A numb, weak, or heavy feeling in your legs     · Dry, scaly, red, or pale skin on your legs     · Thick or brittle nails, or hair loss on your arms and legs     · Foot sores that will not heal     · Burning or aching in your feet and toes while resting (this may be worse when you lie down)  Call 911 for the following:   · You have any of the following signs of a heart attack:      ¨ Squeezing, pressure, or pain in your chest that lasts longer than 5 minutes or returns    ¨ Discomfort or pain in your back, neck, jaw, stomach, or arm     ¨ Trouble breathing    ¨ Nausea or vomiting    ¨ Lightheadedness or a sudden cold sweat, especially with chest pain or trouble breathing    · You have any of the following signs of a stroke:      ¨ Numbness or drooping on one side of your face     ¨ Weakness in an arm or leg    ¨ Confusion or difficulty speaking    ¨ Dizziness, a severe headache, or vision loss  Seek care immediately if:   · You have sores or wounds that will not heal      · You notice black or discolored skin on your arm or leg  · Your skin is cool to the touch  Contact your healthcare provider if:   · You have leg pain when you walk 1/8 mile (200 meters) or less, even with treatment  · Your legs are red, dry, or pale, even with treatment  · You have questions or concerns about your condition or care  Treatment for PAD  can help reduce your risk of a heart attack, stroke, or amputation  You may need more than one of the following:  · Medicines  may be given to prevent blood clots and reduce the risk of a heart attack or stroke   You may be given medicine to help prevent your PAD from getting worse  · A supervised exercise program  helps you stay active in normal daily activities and may prevent disability  Healthcare providers will help you safely walk or do strength training exercises 3 times a week for 30 to 60 minutes  You will do this for several months, then transition to walking on your own  · Angioplasty  is a procedure to open your artery so blood can flow through normally  A thin tube called a catheter is used to insert a small balloon into your artery  The balloon is inflated to open your blocked artery, and then removed  A tube called a stent may be placed in your artery to hold it open  · Bypass surgery  is used to make a new connection to your artery with a vein from another part of your body, or an artificial graft  The vein or graft is attached to your artery above and below your blockage  This allows blood to flow around the blocked portion of your artery  Manage and prevent PAD:   · Walk for 30 to 60 minutes at least 4 times a week  Your healthcare provider may also refer you to an supervised exercise program  The program helps increase how far you can walk without pain  It also helps you stay active in normal daily activities and may prevent disability caused by PAD  · Do not smoke  Nicotine and other chemicals in cigarettes and cigars can worsen PAD  They can also increase your risk for a heart attack or stroke  Ask your healthcare provider for information if you currently smoke and need help to quit  E-cigarettes or smokeless tobacco still contain nicotine  Talk to your healthcare provider before you use these products  · Manage other health conditions  Take your medicines as directed and follow your healthcare provider's instructions if you have high blood pressure or high cholesterol  Perform foot care and check your blood sugar levels as directed if you have diabetes  · Eat heart healthy foods  Eat whole grains, fruits, and vegetables every day  Limit salt and high-fat foods  Ask your healthcare provider for more information on a heart healthy diet  Ask if you need to lose weight  Your healthcare provider can help you create a healthy weight-loss plan  Follow up with your healthcare provider as directed:  Write down your questions so you remember to ask them during your visits  © 2017 [address] Information is for End User's use only and may not be sold, redistributed or otherwise used for commercial purposes  All illustrations and images included in CareNotes® are the copyrighted property of [employer]  or [address]  The above information is an  only  It is not intended as medical advice for individual conditions or treatments  Talk to your doctor, nurse or pharmacist before following any medical regimen to see if it is safe and effective for you  Cigarette Smoking and Your Health   AMBULATORY CARE:   Risks to your health if you smoke:  Nicotine and other chemicals found in tobacco damage every cell in your body  Even if you are a light smoker, you have an increased risk for cancer, heart disease, and lung disease  If you are pregnant or have diabetes, smoking increases your risk for complications  Benefits to your health if you stop smoking:   · You decrease respiratory symptoms such as coughing, wheezing, and shortness of breath  · You reduce your risk for cancers of the lung, mouth, throat, kidney, bladder, pancreas, stomach, and cervix  If you already have cancer, you increase the benefits of chemotherapy  You also reduce your risk for cancer returning or a second cancer from developing  · You reduce your risk for heart disease, blood clots, heart attack, and stroke  · You reduce your risk for lung infections, and diseases such as pneumonia, asthma, chronic bronchitis, and emphysema  · Your circulation improves  More oxygen can be delivered to your body  If you have diabetes, you lower your risk for complications, such as kidney, artery, and eye diseases  You also lower your risk for nerve damage  Nerve damage can lead to amputations, poor vision, and blindness  · You improve your body's ability to heal and to fight infections  Benefits to the health of others if you stop smoking:  Tobacco is harmful to nonsmokers who breathe in your secondhand smoke  The following are ways the health of others around you may improve when you stop smoking:  · You lower the risks for lung cancer and heart disease in nonsmoking adults  · If you are pregnant, you lower the risk for miscarriage, early delivery, low birth weight, and stillbirth  You also lower your baby's risk for SIDS, obesity, developmental delay, and neurobehavioral problems, such as ADHD  · If you have children, you lower their risk for ear infections, colds, pneumonia, bronchitis, and asthma  For more information and support to stop smoking:   · [employer]  Phone: 6- 612 - [phone]  Web Address: www [employer]  Follow up with your healthcare provider as directed:  Write down your questions so you remember to ask them during your visits  © 2017 [address]  Information is for End User's use only and may not be sold, redistributed or otherwise used for commercial purposes  All illustrations and images included in CareNotes® are the copyrighted property of [employer]  or [employer]  The above information is an  only  It is not intended as medical advice for individual conditions or treatments  Talk to your doctor, nurse or pharmacist before following any medical regimen to see if it is safe and effective for you

## 2018-02-20 NOTE — PROGRESS NOTES
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  *   Tobacco Addiction  *   Severe, Dilated, Non-ischemic cardiomyopathy  *   Bi-V ICD in situ  *   LBBB  *   MARRY on CPAP     Mr Sharon Carcamo is a 47M diagnosed with severe NICMP and reportedly mild coronary artery disease last year  He has a very significant smoking history  In the past 8 months, he has developed wounds on the bottom of his toes  On exam, there is evidence of peripheral arterial disease  His feet are cool and he has decreased pulses in the feet  He has motor function intact, but mild decreased sensation  There is no definite, typical historical symptoms of claudication  He has no known rheumatologic conditions, but is in the process of getting blood work with PCP  He can try Tylenol, if no other contraindication, for pain  We will check a Lower Extremity Arterial duplex as prior study seems incongruent with his examination  We had a discussion regarding smoking and the association with vascular disease and increased risk of heart attacks and strokes (as well as cancers)  It is imperative that he stop smoking for his health and to slow progression of his current disease progresses  He is going back to his Internal Medicine physician to discuss alternatives to Chantix since his insurance won't pay for that  He must also stop all alcohol with cardiomyopathy  Continue aspirin  If confirmed vascular disease, we should add statin therapy  Perhaps we could add a trial of a calcium channel blocker in the future to see if that helps him symptomatically  He was asked to bring in blood work from PCP    Rule out rheumatologic cause (doubt)  Please bring in blood      Follow up in one month or sooner if condition worsens, develop new sores on feet, the feet become discolored or increased pain  Subjective:      Patient ID: Laz Millan is a 50 y o  male  HPI   Mr Laz Millan is a 50year old male who was diagnosed with a severe, dilated, NICMP last year for systolic heart failure with EF 10%  The etiology of his myopathy is likely ETOH  He underwent BI-V ICD implantation in October 2017  Since then, his energy is a bit improved but he still is tired  Unfortunately, he continues to smoke cigarettes  He started in grade school and was up to > 2PPD  He has cut down to 1 PPD  The patient presents for evaluation of probable peripheral arterial disease  Since diagnosed with cardiomyopathy last year, he has developed wounds on his toes  He has a dry wounds on the bottom of both great toes, as well as the other toes on the right foot  The great toe on the right foot causes him some pain and he gets pain in the toes after walking distances  No calf pain  The feet have been getting cold this winter and sometimes he needs 2 pairs of socks  He gets mild abnormal sensations in the toes  He hasn't had the same problems with his fingers  The patient reports that at this point, although he can get fatigued and SOB with activity, he feels that his feet play a big role in his functional limitations  He has chronic heart failure, but has been stable  No increased SOB  No chest pain  He does get palpitations at times - he has a device so this can be followed  He uses CPAP at night  No history of blood clots, phlebitis or bleeding  No fevers, chills  No rheumatalgic problems  No history of TIA/ CVA  He did have a vascular lower extremity duplex performed elsewhere  The study report suggest that he has no significant arterial disease bilaterally  However, this is incongruent with his examination   I would like to repeat a study and be able to view actual images  Alternatively, perhaps small vessel or vasculitis as explaination  Doubt thromboembolic  The following portions of the patient's history were reviewed and updated as appropriate: allergies, current medications, past family history, past medical history, past social history, past surgical history and problem list     Review of Systems   Constitutional: Positive for fatigue  HENT: Negative  Eyes: Negative  Respiratory: Positive for apnea and cough  Cardiovascular: Positive for chest pain  Irregular heart rate   Gastrointestinal: Negative  Endocrine: Positive for cold intolerance  Genitourinary: Negative  Musculoskeletal: Negative  Skin: Positive for color change  Allergic/Immunologic: Negative  Neurological: Negative  Hematological: Negative  Psychiatric/Behavioral: Negative  Objective:      /74   Pulse 74   Temp (!) 96 7 °F (35 9 °C)   Wt 116 kg (256 lb)   BMI 33 78 kg/m²      Physical Exam   Constitutional: He is oriented to person, place, and time  He appears well-developed and well-nourished  He is cooperative  HENT:   Head: Normocephalic and atraumatic  Eyes: EOM are normal  Pupils are equal, round, and reactive to light  Neck: Trachea normal  Neck supple  No JVD present  No thyromegaly present  Cardiovascular: Normal rate, regular rhythm, S1 normal and S2 normal   Exam reveals distant heart sounds  Exam reveals no gallop and no friction rub  No murmur heard  Pulses:       Carotid pulses are 2+ on the right side, and 2+ on the left side  Radial pulses are 2+ on the right side, and 2+ on the left side  Femoral pulses are 1+ on the right side, and 1+ on the left side  Popliteal pulses are 0 on the right side, and 0 on the left side  Dorsalis pedis pulses are 0 on the right side, and 0 on the left side          Posterior tibial pulses are 1+ on the right side, and 1+ on the left side    Trace DP pulses are not easily appreciated on exam         There are pop and distal signal present - overall biphasic - bilaterally  Feet are cool  There is trace edema  Pulmonary/Chest: Effort normal and breath sounds normal  No accessory muscle usage  No respiratory distress  He has no wheezes  He has no rales  Decreased breath sounds, but otherwise clear  Abdominal: Soft  Bowel sounds are normal  He exhibits no distension  There is no hepatosplenomegaly  There is no tenderness  Musculoskeletal: Normal range of motion  He exhibits edema  He exhibits no deformity  Neurological: He is alert and oriented to person, place, and time  Grossly normal    Skin: Skin is warm and dry  No lesion noted  No cyanosis  Nails show no clubbing    + lesions on toes - see clinical images  Psychiatric: He has a normal mood and affect  Nursing note and vitals reviewed  Vitals:    02/20/18 1334   BP: 118/74   Pulse: 74   Temp: (!) 96 7 °F (35 9 °C)   Weight: 116 kg (256 lb)       Patient Active Problem List   Diagnosis    CHF, chronic (HCC)    HTN (hypertension)    Alcohol abuse    Tobacco abuse    Cardiomyopathy, dilated (HCC)    Peripheral arterial disease ([facility] [address] )       Past Surgical History:   Procedure Laterality Date    BRONCHOSCOPY  03/10/2017    CARDIAC PACEMAKER PLACEMENT  10/04/2017       Family History   Problem Relation Age of Onset    No Known Problems Family     Diabetes Mother        Social History     Social History    Marital status: Single     Spouse name: N/A    Number of children: N/A    Years of education: N/A     Occupational History    Not on file       Social History Main Topics    Smoking status: Current Every Day Smoker     Packs/day: 1 00     Years: 40 00     Types: Cigarettes    Smokeless tobacco: Former User    Alcohol use Yes      Comment: socially    Drug use: No    Sexual activity: Not on file     Other Topics Concern    Not on file     Social History Narrative    No narrative on file       No Known Allergies      Current Outpatient Prescriptions:     aspirin 81 mg chewable tablet, Chew 81 mg daily, Disp: , Rfl:     busPIRone (BUSPAR) 10 mg tablet, Take 20 mg by mouth 2 (two) times a day, Disp: , Rfl:     carvedilol (COREG) 6 25 mg tablet, Take 6 25 mg by mouth 2 (two) times a day with meals, Disp: , Rfl:     furosemide (LASIX) 40 mg tablet, Take 40 mg by mouth 2 (two) times a day  , Disp: , Rfl:     nitroglycerin (NITROSTAT) 0 4 mg SL tablet, Place 0 4 mg under the tongue every 5 (five) minutes as needed for chest pain, Disp: , Rfl:     sacubitril-valsartan (ENTRESTO) 24-26 MG TABS, Take 1 tablet by mouth 2 (two) times a day, Disp: 60 tablet, Rfl: 5    Tiotropium Bromide Monohydrate (SPIRIVA RESPIMAT) 2 5 MCG/ACT AERS, Inhale 2 5 mcg, Disp: , Rfl:     Fluticasone Furoate (ARNUITY ELLIPTA) 100 MCG/ACT AEPB, Inhale 100 mcg, Disp: , Rfl:

## 2018-02-21 NOTE — PROGRESS NOTES
[employer] TRANSMISSION - OPTI-VOL ONLY: ICD  BATTERY STATUS "OK"  AP 1 5% BVP 98% VSRP 3 5%  ALL AVAILABLE LEAD PARAMETERS WITHIN NORMAL LIMITS  NO SIGNIFICANT HIGH RATE EPISODES  [address] OPTI-VOL WITHIN NORMAL LIMITS  NORMAL DEVICE FUNCTION  NC     Current Outpatient Prescriptions:     aspirin 81 mg chewable tablet, Chew 81 mg daily, Disp: , Rfl:     busPIRone (BUSPAR) 10 mg tablet, Take 20 mg by mouth 2 (two) times a day, Disp: , Rfl:     carvedilol (COREG) 6 25 mg tablet, Take 6 25 mg by mouth 2 (two) times a day with meals, Disp: , Rfl:     Fluticasone Furoate (ARNUITY ELLIPTA) 100 MCG/ACT AEPB, Inhale 100 mcg, Disp: , Rfl:     furosemide (LASIX) 40 mg tablet, Take 40 mg by mouth 2 (two) times a day  , Disp: , Rfl:     nitroglycerin (NITROSTAT) 0 4 mg SL tablet, Place 0 4 mg under the tongue every 5 (five) minutes as needed for chest pain, Disp: , Rfl:     sacubitril-valsartan (ENTRESTO) 24-26 MG TABS, Take 1 tablet by mouth 2 (two) times a day, Disp: 60 tablet, Rfl: 5    Tiotropium Bromide Monohydrate (SPIRIVA RESPIMAT) 2 5 MCG/ACT AERS, Inhale 2 5 mcg, Disp: , Rfl:     Haley [address] [place] [address]  [phone]     Transthoracic Echocardiogram  2D, M-mode, Doppler, and Color Doppler     Study date:  08-Jan-2018  LEFT VENTRICLE:  The ventricle was moderately to markedly dilated  6 3 cm in parasternal view in diastole  Systolic function was severely reduced  Ejection fraction was estimated in the range of 25 % to 30 %

## 2018-02-22 ENCOUNTER — HOSPITAL ENCOUNTER (OUTPATIENT)
Dept: NON INVASIVE DIAGNOSTICS | Facility: CLINIC | Age: 48
Discharge: HOME | End: 2018-02-22
Payer: COMMERCIAL

## 2018-02-22 DIAGNOSIS — I73.9 PERIPHERAL ARTERIAL DISEASE (HCC): ICD-10-CM

## 2018-02-22 PROCEDURE — 93923 UPR/LXTR ART STDY 3+ LVLS: CPT

## 2018-02-22 PROCEDURE — 93925 LOWER EXTREMITY STUDY: CPT

## 2018-02-23 PROCEDURE — 93922 UPR/L XTREMITY ART 2 LEVELS: CPT | Performed by: SURGERY

## 2018-02-23 PROCEDURE — 93925 LOWER EXTREMITY STUDY: CPT | Performed by: SURGERY

## 2018-03-07 NOTE — PROGRESS NOTES
"  Discussion/Summary  Normal device function     4s episode of -300bpm resolved spontaneously  Results/Data  Cardiac Device In Clinic [id number] 06:04PM Radha Guzman     Test Name Result Flag Reference   MISCELLANEOUS COMMENT (Report)     DEVICE INTERROGATED IN THE [facility] OFFICE: BATTERY VOLTAGE ADEQUATE (7 9 YR)  AP 2 6% BP 97 5% (VSRP 4 4%)  ALL LEAD PARAMETERS WITHIN NORMAL LIMITS  1 HIGH RATE-NS EPISODE WITH EGM SHOWING VT/VF (23 @ 297 BPM)  PT TAKES ASA AND CARVEDILOL  EF 10-15% (ECHO 9/2017)  878 V SENSE EPISODES FOR ST (12 MIN/D)  OPTI-VOL WITHIN NORMAL LIMITS  DECREASE MADE TO RA/RV AMPLITUDES TO PROMOTE DEVICE LONGEVITY WHILE MAINTAINING AN APPROPRIATE SAFETY MARGIN  NORMAL DEVICE FUNCTION  RG   Cardiac Electrophysiology Report      [id number]  pdf   DEVICE TYPE CRT-D       Cardiac Electrophysiology Report [id number] 06:04PM Radha Guzman     Test Name Result Flag Reference   Cardiac Electrophysiology Report      [id number]  pdf     Signatures   Electronically signed by : Yamilet Szymanski, ; Jan 4 2018  3:07PM EST                       (Author)    Electronically signed by : VALENTINA Hair ; Jan 4 2018  3:20PM EST                       (Author)    "

## 2018-03-07 NOTE — PROGRESS NOTES
"  Discussion/Summary  Normal device function      Results/Data  Cardiac Device In Clinic [id number] 02:44PM Ortega Gey     Test Name Result Flag Reference   MISCELLANEOUS COMMENT (Report)     DEVICE INTERROGATED IN THE [facility] OFFICE  BATTERY VOLTAGE ADEQUATE (7 YRS)  AP 7%  88% VSR 7 4%  ALL LEAD PARAMETERS WITHIN NORMAL LIMITS  NO SIGNIFICANT HIGH RATE EPISODES  NORMAL DEVICE FUNCTION  WOUND CHECK: INCISION CLEAN AND DRY WITH EDGES APPROXIMATED; WOUND CARE AND RESTRICTIONS REVIEWED WITH PATIENT  ---ARCHULETA   Cardiac Electrophysiology Report      [id number]  pdf   DEVICE TYPE CRT-D       Cardiac Electrophysiology Report [id number] 02:44PM Ortega MitrAssist     Test Name Result Flag Reference   Cardiac Electrophysiology Report      [id number]  pdf     Signatures   Electronically signed by : Shyam Washington, ; Oct 23 2017 10:48AM EST                       (Author)    Electronically signed by : VALENTINA Freitas ; Oct 23 2017 12:43PM EST                       (Author)    "

## 2018-03-19 DIAGNOSIS — I50.42 CHRONIC COMBINED SYSTOLIC AND DIASTOLIC CONGESTIVE HEART FAILURE (HCC): Primary | ICD-10-CM

## 2018-03-19 RX ORDER — ASPIRIN 81 MG/1
81 TABLET, CHEWABLE ORAL DAILY
Qty: 90 TABLET | Refills: 3 | Status: SHIPPED | OUTPATIENT
Start: 2018-03-19

## 2018-03-22 ENCOUNTER — OFFICE VISIT (OUTPATIENT)
Dept: VASCULAR SURGERY | Facility: CLINIC | Age: 48
End: 2018-03-22
Payer: COMMERCIAL

## 2018-03-22 VITALS
SYSTOLIC BLOOD PRESSURE: 110 MMHG | DIASTOLIC BLOOD PRESSURE: 70 MMHG | RESPIRATION RATE: 20 BRPM | HEIGHT: 73 IN | BODY MASS INDEX: 34.19 KG/M2 | HEART RATE: 80 BPM | WEIGHT: 258 LBS

## 2018-03-22 DIAGNOSIS — I10 ESSENTIAL HYPERTENSION: ICD-10-CM

## 2018-03-22 DIAGNOSIS — Z72.0 TOBACCO ABUSE: ICD-10-CM

## 2018-03-22 DIAGNOSIS — I70.235 ATHEROSCLEROSIS OF NATIVE ARTERIES OF RIGHT LEG WITH ULCERATION OF OTHER PART OF FOOT (HCC): Primary | ICD-10-CM

## 2018-03-22 PROBLEM — I73.9 PERIPHERAL ARTERIAL DISEASE (HCC): Status: RESOLVED | Noted: 2018-02-20 | Resolved: 2018-03-22

## 2018-03-22 PROCEDURE — 99214 OFFICE O/P EST MOD 30 MIN: CPT | Performed by: NURSE PRACTITIONER

## 2018-03-22 NOTE — PATIENT INSTRUCTIONS
Mild peripheral arterial disease  -You are healing the dry skin/sores to your toes well    -We will plan to monitor your peripheral arterial disease with a repeat arterial study in 6 months  -If the dry skin/ulcer on your right great toe gets worse instead of continuing to heal you should notify the office  -Followup in 6 months for recheck after your repeat study

## 2018-03-22 NOTE — PROGRESS NOTES
Assessment/Plan:    51 y/o M with severe dilated ischemic cardiomyopathy with EF 10%, s/p ICD, CAD, systolic CHF, seen for follow up after recent PATRICIO  -PATRICIO results:  R CFA 50-75% stenosis, LLE with no significant disease  GTP above healing on left, MTP/GTP unobtainable on right   -Since his last visit he has completely healed the ulcers to his right 2nd and 5th toes and left hallux  -Right hallux with dry callused ulceration to plantar aspect  This is healing per patient and appears improved from prior visit compared to pictures  -Palpable right PT pulse  -Being that he has healed the ulcers to the right 2nd and 5th toes and hallux is improving will continue to monitor  -Flatlined GTP may have been due to vasospasm if there is underlying Buerger's disease  -Will plan to repeat PATRICIO in 6 months  -Followup after repeat PATRICIO  Notify office if right hallux ulcer degenerates or fails to heal     There are no diagnoses linked to this encounter  Subjective:      Patient ID: Nori Baptiste is a 50 y o  male  This patient is seen for review after recent arterial study  He had PATRICIO done 2/22/18  The ulcer to his left has completely healed, only with dry skin remained, which was removed with intact skin below  The same dry skin was removed from right fifth and second toes with healthy intact skin below  He states that the right hallux ulcer is healing  It appears mostly as dry skin today  He denies any classic claudication  No leg pain when walking, however he does have pain to the right hallux when walking  He states that he also gets the same right hallux pain when driving or sitting with his foot in flexed position for too long  Denies any pain the the feet at night  He had unobtainable right MTP/GTP on PATRICIO  He complains that his feet were very cold during exam, so this may be due to vasospasm if there is element of buerger's disease             The following portions of the patient's history were reviewed and updated as appropriate: allergies, current medications, past family history, past medical history, past social history, past surgical history and problem list     Review of Systems   Constitutional: Negative  Eyes: Negative  Respiratory: Positive for apnea, cough, chest tightness and stridor  Cardiovascular: Negative  Gastrointestinal: Negative  Endocrine: Negative  Genitourinary: Negative  Musculoskeletal: Negative  Skin: Positive for wound  Allergic/Immunologic: Negative  Neurological: Negative  Hematological: Negative  Psychiatric/Behavioral: Negative  Objective:     Physical Exam   Constitutional: He is oriented to person, place, and time  He appears well-nourished  No distress  HENT:   Head: Normocephalic and atraumatic  Eyes: Conjunctivae are normal    Neck: Neck supple  No JVD present  Cardiovascular: Normal rate  An irregularly irregular rhythm present  Pulses:       Femoral pulses are 2+ on the right side, and 2+ on the left side  Dorsalis pedis pulses are 1+ on the left side  Posterior tibial pulses are 1+ on the right side  Pulmonary/Chest: Effort normal  No respiratory distress  Musculoskeletal: Normal range of motion  He exhibits no edema  Neurological: He is alert and oriented to person, place, and time  Skin: Skin is warm and dry  Dry callused appearing skin to plantar aspect of right hallux with slight underlying discoloration  Dry skin removed from left hallux, right 5th toe and 2nd toe with healthy intact skin below  No gross tissue loss   Psychiatric: He has a normal mood and affect           Vitals:    03/22/18 0933   BP: 110/70   BP Location: Right arm   Patient Position: Sitting   Cuff Size: Standard   Pulse: 80   Resp: 20   Weight: 117 kg (258 lb)   Height: 6' 1" (1 854 m)       Patient Active Problem List   Diagnosis    CHF, chronic (HCC)    HTN (hypertension)    Alcohol abuse    Tobacco abuse    [facility]    Peripheral arterial disease ([facility] )       Past Surgical History:   Procedure Laterality Date    BRONCHOSCOPY  03/10/2017    CARDIAC PACEMAKER PLACEMENT  10/04/2017       Family History   Problem Relation Age of Onset    No Known Problems Family     Diabetes Mother        Social History     Social History    Marital status: Single     Spouse name: N/A    Number of children: N/A    Years of education: N/A     Occupational History    Not on file       Social History Main Topics    Smoking status: Current Every Day Smoker     Packs/day: 1 00     Years: 40 00     Types: Cigarettes    Smokeless tobacco: Former User    Alcohol use Yes      Comment: socially    Drug use: No    Sexual activity: Not on file     Other Topics Concern    Not on file     Social History Narrative    No narrative on file       No Known Allergies      Current Outpatient Prescriptions:     aspirin 81 mg chewable tablet, Chew 1 tablet (81 mg total) daily, Disp: 90 tablet, Rfl: 3    busPIRone (BUSPAR) 10 mg tablet, Take 20 mg by mouth 2 (two) times a day, Disp: , Rfl:     carvedilol (COREG) 6 25 mg tablet, Take 6 25 mg by mouth 2 (two) times a day with meals, Disp: , Rfl:     Fluticasone Furoate (ARNUITY ELLIPTA) 100 MCG/ACT AEPB, Inhale 100 mcg, Disp: , Rfl:     furosemide (LASIX) 40 mg tablet, Take 40 mg by mouth 2 (two) times a day  , Disp: , Rfl:     nitroglycerin (NITROSTAT) 0 4 mg SL tablet, Place 0 4 mg under the tongue every 5 (five) minutes as needed for chest pain, Disp: , Rfl:     sacubitril-valsartan (ENTRESTO) 24-26 MG TABS, Take 1 tablet by mouth 2 (two) times a day, Disp: 60 tablet, Rfl: 5    Tiotropium Bromide Monohydrate (SPIRIVA RESPIMAT) 2 5 MCG/ACT AERS, Inhale 2 5 mcg, Disp: , Rfl:

## 2018-03-26 ENCOUNTER — CLINICAL SUPPORT (OUTPATIENT)
Dept: CARDIOLOGY CLINIC | Facility: CLINIC | Age: 48
End: 2018-03-26
Payer: COMMERCIAL

## 2018-03-26 DIAGNOSIS — I50.22 CHRONIC SYSTOLIC CONGESTIVE HEART FAILURE (HCC): Primary | ICD-10-CM

## 2018-03-26 DIAGNOSIS — Z95.810 BIVENTRICULAR IMPLANTABLE CARDIOVERTER-DEFIBRILLATOR IN SITU: ICD-10-CM

## 2018-03-26 DIAGNOSIS — I42.0 CARDIOMYOPATHY, DILATED (HCC): Chronic | ICD-10-CM

## 2018-03-26 DIAGNOSIS — I42.0 DILATED CARDIOMYOPATHY (HCC): ICD-10-CM

## 2018-03-26 DIAGNOSIS — I47.2 VENTRICULAR TACHYCARDIA (HCC): ICD-10-CM

## 2018-03-26 DIAGNOSIS — I44.7 LBBB (LEFT BUNDLE BRANCH BLOCK): ICD-10-CM

## 2018-03-26 PROCEDURE — 93297 REM INTERROG DEV EVAL ICPMS: CPT

## 2018-03-26 PROCEDURE — 93299 PR REM INTERROG ICPMS/SCRMS <30 D TECH REVIEW: CPT | Performed by: INTERNAL MEDICINE

## 2018-03-26 NOTE — PROGRESS NOTES
CARELINK TRANSMISSION (CRT-D)  - OPTI-VOL ONLY: OPTI-VOL WITHIN NORMAL LIMITS  BATTERY VOLTAGE ADEQUATE (3 6 YRS)  AP - 8% BVP - 93 8% [  - 85 2% + VSRP - 8 6%]  ALL AVAILABLE LEAD PARAMETERS APPEAR WITHIN NORMAL LIMITS  1 NSVT EPISODE WITH DURATION @ 7 BEATS/AVG CL ~ 240 MS)  EF - 25% (1/2018 ECH0)  PT ON ASA 81, CARVEDILOL  TASK TO DR DUNHAM FOR HR >200 BPM  283 V SENSING EVENT [MARKERS ONLY- 7 6 MIN/DAY] ST, V FUSION @ 133-158 BPM  NORMAL DEVICE FUNCTION   eb

## 2018-04-27 ENCOUNTER — IN-CLINIC DEVICE VISIT (OUTPATIENT)
Dept: CARDIOLOGY CLINIC | Facility: CLINIC | Age: 48
End: 2018-04-27
Payer: COMMERCIAL

## 2018-04-27 DIAGNOSIS — I47.2 NSVT (NONSUSTAINED VENTRICULAR TACHYCARDIA) (HCC): ICD-10-CM

## 2018-04-27 DIAGNOSIS — I50.22 CHRONIC SYSTOLIC CONGESTIVE HEART FAILURE (HCC): ICD-10-CM

## 2018-04-27 DIAGNOSIS — I42.0 DILATED CARDIOMYOPATHY (HCC): Primary | ICD-10-CM

## 2018-04-27 DIAGNOSIS — Z95.810 BIVENTRICULAR IMPLANTABLE CARDIOVERTER-DEFIBRILLATOR IN SITU: ICD-10-CM

## 2018-04-27 DIAGNOSIS — I44.7 LBBB (LEFT BUNDLE BRANCH BLOCK): ICD-10-CM

## 2018-04-27 PROCEDURE — 93296 REM INTERROG EVL PM/IDS: CPT | Performed by: INTERNAL MEDICINE

## 2018-04-27 PROCEDURE — 93295 DEV INTERROG REMOTE 1/2/MLT: CPT | Performed by: INTERNAL MEDICINE

## 2018-04-27 NOTE — PROGRESS NOTES
MED[employer] BIV ICD   CARELINK TRANSMISSION: BATTERY VOLTAGE ADEQUATE (5 YRS)  AP - 4 2% BVP - 96 3% [  - 89% + VSRP - 7 3%]  ALL AVAILABLE LEAD PARAMETERS APPEAR WITHIN NORMAL LIMITS  EF - 25%   371  V SENSING EPISODES [MARKERS ONLY- 12 MIN/DAY] SHOWING ST, V FUSION @ 136-146 BPM  OPTI-VOL WITHIN NORMAL LIMITS  NORMAL DEVICE FUNCTION   eb

## 2018-05-09 ENCOUNTER — OFFICE VISIT (OUTPATIENT)
Dept: CARDIOLOGY CLINIC | Facility: CLINIC | Age: 48
End: 2018-05-09
Payer: COMMERCIAL

## 2018-05-09 VITALS
BODY MASS INDEX: 34.43 KG/M2 | OXYGEN SATURATION: 95 % | DIASTOLIC BLOOD PRESSURE: 60 MMHG | WEIGHT: 261 LBS | SYSTOLIC BLOOD PRESSURE: 120 MMHG | HEART RATE: 64 BPM

## 2018-05-09 DIAGNOSIS — I50.22 CHRONIC SYSTOLIC CHF (CONGESTIVE HEART FAILURE), NYHA CLASS 2 (HCC): Primary | ICD-10-CM

## 2018-05-09 DIAGNOSIS — Z72.0 TOBACCO USE: ICD-10-CM

## 2018-05-09 PROCEDURE — 99214 OFFICE O/P EST MOD 30 MIN: CPT | Performed by: INTERNAL MEDICINE

## 2018-05-09 NOTE — PROGRESS NOTES
Heart Failure Outpatient Progress Note - Izaiah Islas 50 y o  male MRN: [id number]    @ Encounter: [id number]      Assessment/Plan:    Patient Active Problem List    Diagnosis Date Noted    Atherosclerosis of native arteries of right leg with ulceration of other part of foot [facility] 03/22/2018    Cardiomyopathy, dilated [facility] ) 08/10/2017    [facility]F, chronic ([facility] ) 06/28/2017    HTN (hypertension) 06/28/2017    Alcohol abuse 06/28/2017    Tobacco abuse 06/28/2017     1  Chronic Systolic CHF, Stage C  Etiology: DNICM, possible ETOH  Echo 1/8/18: EF: 10%, Stage C, NYHA 3  LHC 6/29/17 negative for obstructive CAD, LVEDP 30 mmhg  [place]:  2017 RHC; PA sat 67%, CI 1 9, PCW 25 mmHg  BiV ICD implanted 10/4/17 for native LBBB  msec:   2017 RHC; PA sat 67%, CI 1 9, PCW 25 mmHg  Weight last time 242 lbs, weight 258 today    TODAY'S PLAN:  Change to Entresto as NYHA 2 with EF < 35%, per ACC/ AHA guidelines  --2g sodium diet  - Daily weights    Neurohormonal Blockade:  --Beta-Blocker:coreg 6 25 mg BID  --ACEi, ARB or ARNi: entresto 24/26 mg BID  --Aldosterone Receptor Blocker:  --Diuretic: Lasix 40 mg BID    Sudden Cardiac Death Risk Reduction:  --ICD: 10/4/17- Jan 4 interrogation: 4 sec of VT/VR at 290 bpm- terminated on own    Electrical Resynchronization:  --Candidacy for BiV device: BiV placed in 10/4- 97 5% paced  Positive response: EF: to 25-30% and LVEDd down to 6 3 from 6 8 with BiV pacer    #  Etoh abuse history- down to 8 beers weekly  #  tobacco use- continued, did not tolerate Chantix- mood changed  #  MARRY- cpap  # PVD- LEADs: R CFA 50-75% stenosis, LLE with no sign diz  Healed ulcers on right toes  Likely vasospasm from Buerger's dz    HPI:   Izaiah Islas is a 50y o  year old male with a history of a cardiomyopathy diagnosed at [facility] in March 2017 presents from his PCP's office with chest pain  He presented to [facility] in March with progressive shortness of breath and a cough   He was found to have an elevated BNP and echo revealed an dialated cardiomyopathy with EF: 10% LVEDD: 6 6cm; with mild MR and mild TR  He was diuresed with IV lasix and started on metoprolol succinate  There was reportedly no ischemic workup done during that admission  He was fitted for a lifevest at the time of discharge  He was occasionally compliant with his lifevest but reportedly was compliant with his medications  He had a follow up echo after 3 months however he is unsure of the results but was being considered for a pacemaker  He was referred to EP at [place] however did not continue under their care  He has been diagnosed with MARRY and is on CPAP  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  smokes 1ppd now but as much as 3ppd for the past 40 years  drinks extensively up to 1 case of beer plus hard liquor in a day  He has cut back since March  The last time he drank heavily was about a month ago  He was in the hospital in June with chest pain and cardiac cath was negative for obstructive CAD but LVEDP was elevated at 30 mmHg  He was discharged on LIfeVest  Does not want to wear  Since discharge he started feeling really bad again in the last 3 weeks again with a band sensation of chest pain  His appetite is poor and lost 37 lbs  Zero energy  Down to less than 10 cigarettes a day  Currently lives by himself  Grown adult children  Has a girlfriend that lives around 45 minutes ago  Â 10/27: RHC in Aug CI was 1 9 and PA sat 67% and wedge 24  He underwent BiV ICD placed Oct 4 ok  Drinking about 8 beers a week  Interval History:  Last visit we changed to entresto  On follow up down to about 3-4 beers a week  Feels well, working daily with no symptoms    Past Medical History:   Diagnosis Date    Cardiac disease     [facility]     [facility]     Hypertension        Review of Systems - Negative except no SOB, no pND, no edema   No syncope or near syncope  Fatigue    Allergies   Allergen Reactions    Chantix [Varenicline]        Current Outpatient Prescriptions:     aspirin 81 mg chewable tablet, Chew 1 tablet (81 mg total) daily, Disp: 90 tablet, Rfl: 3    busPIRone (BUSPAR) 10 mg tablet, Take 20 mg by mouth 2 (two) times a day, Disp: , Rfl:     carvedilol (COREG) 6 25 mg tablet, Take 6 25 mg by mouth 2 (two) times a day with meals, Disp: , Rfl:     Fluticasone Furoate (ARNUITY ELLIPTA) 100 MCG/ACT AEPB, Inhale 100 mcg, Disp: , Rfl:     furosemide (LASIX) 40 mg tablet, Take 40 mg by mouth 2 (two) times a day  , Disp: , Rfl:     nitroglycerin (NITROSTAT) 0 4 mg SL tablet, Place 0 4 mg under the tongue every 5 (five) minutes as needed for chest pain, Disp: , Rfl:     sacubitril-valsartan (ENTRESTO) 24-26 MG TABS, Take 1 tablet by mouth 2 (two) times a day, Disp: 60 tablet, Rfl: 5    Social History     Social History    Marital status: Single     Spouse name: N/A    Number of children: N/A    Years of education: N/A     Occupational History    Not on file  Social History Main Topics    Smoking status: Current Every Day Smoker     Packs/day: 1 00     Years: 40 00     Types: Cigarettes    Smokeless tobacco: Former User    Alcohol use Yes      Comment: socially    Drug use: No    Sexual activity: Not on file     Other Topics Concern    Not on file     Social History Narrative    No narrative on file       Family History   Problem Relation Age of Onset    No Known Problems Family     Diabetes Mother        Physical Exam:    Vitals: Blood pressure 120/60, pulse 64, weight 118 kg (261 lb), SpO2 95 %  , Body mass index is 34 43 kg/m² ,   Wt Readings from Last 3 Encounters:   05/09/18 118 kg (261 lb)   03/22/18 117 kg (258 lb)   02/20/18 116 kg (256 lb)       Physical Exam:  Vitals:    05/09/18 1522   BP: 120/60   BP Location: Right arm   Patient Position: Sitting   Cuff Size: Standard   Pulse: 64   SpO2: 95%   Weight: 118 kg (261 lb) GEN: Valeen Blight appears well, alert and oriented x 3, pleasant and cooperative   HEENT: pupils equal, round, and reactive to light; extraocular muscles intact  NECK: supple, no carotid bruits   HEART: regular rhythm, normal S1 and S2, no murmurs, clicks, gallops or rubs, JVP is  down  LUNGS: clear to auscultation bilaterally; no wheezes, rales, or rhonchi   ABDOMEN: normal bowel sounds, soft, no tenderness, no distention  EXTREMITIES: peripheral pulses normal; no clubbing, cyanosis, or edema  NEURO: no focal findings   SKIN: normal without suspicious lesions on exposed skin    Labs & Results:    Lab Results   Component Value Date    GLUCOSE 98 10/05/2017    CALCIUM 8 7 10/05/2017     10/05/2017    K 3 9 10/05/2017    CO2 26 10/05/2017     10/05/2017    BUN 18 10/05/2017    CREATININE 0 96 10/05/2017     Lab Results   Component Value Date    WBC 9 28 10/05/2017    HGB 14 6 10/05/2017    HCT 40 9 10/05/2017    MCV 89 10/05/2017     10/05/2017     Lab Results   Component Value Date    NTBNP 3,463 (H) 06/28/2017      Lab Results   Component Value Date    CHOL 184 06/28/2017     Lab Results   Component Value Date    HDL 54 06/28/2017     Lab Results   Component Value Date    LDLCALC 104 (H) 06/28/2017     Lab Results   Component Value Date    TRIG 131 06/28/2017     No components found for: CHOLHDL    Echocardiogram 1/8/18  LVEF: 25-30%  LVIDd: 6 3 cm  RV:  MR:  PASP:  RVOT:   Other:    EKG personally reviewed by Andree Davies DO  Counseling / Coordination of Care  Total floor / unit time spent today 25 minutes  Greater than 50% of total time was spent with the patient and / or family counseling and / or coordination of care  A description of the counseling / coordination of care: 15      Thank you for the opportunity to participate in the care of this patient    CORONA REYNA [address]tt[address] Robb

## 2018-05-30 ENCOUNTER — IN-CLINIC DEVICE VISIT (OUTPATIENT)
Dept: CARDIOLOGY CLINIC | Facility: CLINIC | Age: 48
End: 2018-05-30
Payer: COMMERCIAL

## 2018-05-30 DIAGNOSIS — I50.22 CHRONIC SYSTOLIC CONGESTIVE HEART FAILURE (HCC): Primary | ICD-10-CM

## 2018-05-30 DIAGNOSIS — I44.7 LEFT BUNDLE BRANCH BLOCK: ICD-10-CM

## 2018-05-30 DIAGNOSIS — I42.0 DILATED CARDIOMYOPATHY (HCC): ICD-10-CM

## 2018-05-30 DIAGNOSIS — I47.2 VENTRICULAR TACHYCARDIA (HCC): ICD-10-CM

## 2018-05-30 PROCEDURE — 93297 REM INTERROG DEV EVAL ICPMS: CPT | Performed by: INTERNAL MEDICINE

## 2018-05-30 NOTE — PROGRESS NOTES
CARELINK TRANSMISSION:  OPTI VOL ONLY:  OPTI-VOL WITHIN NORMAL LIMITS  BATTERY VOLTAGE ADEQUATE (3 7 YRS)  AP - 2 1%  - 87 9% + VSRP - 8 9% = 96 8%  ALL AVAILABLE LEAD PARAMETERS APPEAR WITHIN NORMAL LIMITS  EF - 25%  [address], V FUSION @ 133-150 BPM  NORMAL DEVICE FUNCTION    CH/EB

## 2018-07-03 ENCOUNTER — REMOTE DEVICE CLINIC VISIT (OUTPATIENT)
Dept: CARDIOLOGY CLINIC | Facility: CLINIC | Age: 48
End: 2018-07-03
Payer: COMMERCIAL

## 2018-07-03 DIAGNOSIS — I47.2 NONSUSTAINED VENTRICULAR TACHYCARDIA (HCC): ICD-10-CM

## 2018-07-03 DIAGNOSIS — Z95.810 BIVENTRICULAR IMPLANTABLE CARDIOVERTER-DEFIBRILLATOR IN SITU: ICD-10-CM

## 2018-07-03 DIAGNOSIS — I42.0 DILATED CARDIOMYOPATHY (HCC): Primary | ICD-10-CM

## 2018-07-03 DIAGNOSIS — I50.22 CHRONIC SYSTOLIC CONGESTIVE HEART FAILURE (HCC): ICD-10-CM

## 2018-07-03 PROCEDURE — 93297 REM INTERROG DEV EVAL ICPMS: CPT | Performed by: INTERNAL MEDICINE

## 2018-07-03 PROCEDURE — 93299 PR REM INTERROG ICPMS/SCRMS <30 D TECH REVIEW: CPT | Performed by: INTERNAL MEDICINE

## 2018-07-03 NOTE — PROGRESS NOTES
Results for orders placed or performed in visit on 07/03/18   Cardiac EP device report    Narrative    MEDTR[employer] BIV ICD  CARELINK TRANSMISSION:  OPTI VOL ONLY:  OPTI-VOL WITHIN NORMAL LIMITS  BATTERY VOLTAGE ADEQUATE (3 3 YRS)  AP - 0 9% , BVP 96 9% ( - 90% + VSRP - 6 9%)  ALL AVAILABLE LEAD PARAMETERS APPEAR WITHIN NORMAL LIMITS  [address] [place] [address] @ 133-150 BPM  NORMAL DEVICE FUNCTION    eb

## 2018-07-09 ENCOUNTER — TELEPHONE (OUTPATIENT)
Dept: CARDIOLOGY CLINIC | Facility: MEDICAL CENTER | Age: 48
End: 2018-07-09

## 2018-07-10 ENCOUNTER — TELEPHONE (OUTPATIENT)
Dept: CARDIOLOGY CLINIC | Facility: CLINIC | Age: 48
End: 2018-07-10

## 2018-07-11 ENCOUNTER — HOSPITAL ENCOUNTER (OUTPATIENT)
Dept: NON INVASIVE DIAGNOSTICS | Facility: CLINIC | Age: 48
Discharge: HOME | End: 2018-07-11
Payer: COMMERCIAL

## 2018-07-11 DIAGNOSIS — I50.22 CHRONIC SYSTOLIC CHF (CONGESTIVE HEART FAILURE), NYHA CLASS 2 (HCC): ICD-10-CM

## 2018-07-11 PROCEDURE — 93306 TTE W/DOPPLER COMPLETE: CPT

## 2018-07-12 PROCEDURE — 93306 TTE W/DOPPLER COMPLETE: CPT | Performed by: INTERNAL MEDICINE

## 2018-07-24 ENCOUNTER — TELEPHONE (OUTPATIENT)
Dept: CARDIOLOGY CLINIC | Facility: CLINIC | Age: 48
End: 2018-07-24

## 2018-07-24 NOTE — TELEPHONE ENCOUNTER
Called patient about their Echo results no significant changes from prior study, left message  ===View-only below this line===    ----- Message -----  From: Cherylann Schirmer, DO  Sent: 7/24/2018   7:56 AM  To: Tomasa Gamino MA, Cardiology [facility] Clinical    There is no significant change from prior study   EF still around 30%, no change in therapy at this time

## 2018-08-06 ENCOUNTER — TELEPHONE (OUTPATIENT)
Dept: CARDIOLOGY CLINIC | Facility: CLINIC | Age: 48
End: 2018-08-06

## 2018-08-06 NOTE — TELEPHONE ENCOUNTER
Called patient and left a voicemail message regarding echo results     ===View-only below this line===    ----- Message -----  From: Sukh Cole DO  Sent: 7/24/2018   7:56 AM  To: Frances Cat MA, Cardiology [facility]    There is no significant change from prior study   EF still around 30%, no change in therapy at this time

## 2018-08-07 ENCOUNTER — TELEPHONE (OUTPATIENT)
Dept: CARDIOLOGY CLINIC | Facility: CLINIC | Age: 48
End: 2018-08-07

## 2018-08-07 NOTE — TELEPHONE ENCOUNTER
Called the patient, left a message again about his echo results        ===View-only below this line===    ----- Message -----  From: Diamond Yu DO  Sent: 7/24/2018   7:56 AM  To: Chance Blunt MA, Cardiology [facility] Clinical    There is no significant change from prior study   EF still around 30%, no change in therapy at this time

## 2019-03-22 ENCOUNTER — OFFICE VISIT (OUTPATIENT)
Dept: CARDIOLOGY CLINIC | Facility: CLINIC | Age: 49
End: 2019-03-22
Payer: COMMERCIAL

## 2019-03-22 VITALS
BODY MASS INDEX: 32.74 KG/M2 | WEIGHT: 247 LBS | SYSTOLIC BLOOD PRESSURE: 116 MMHG | HEART RATE: 93 BPM | HEIGHT: 73 IN | DIASTOLIC BLOOD PRESSURE: 76 MMHG | OXYGEN SATURATION: 97 %

## 2019-03-22 DIAGNOSIS — G47.33 OSA ON CPAP: ICD-10-CM

## 2019-03-22 DIAGNOSIS — I42.8 NICM (NONISCHEMIC CARDIOMYOPATHY) (HCC): ICD-10-CM

## 2019-03-22 DIAGNOSIS — Z99.89 OSA ON CPAP: ICD-10-CM

## 2019-03-22 DIAGNOSIS — I50.22 CHRONIC SYSTOLIC CHF (CONGESTIVE HEART FAILURE), NYHA CLASS 3 (HCC): Primary | ICD-10-CM

## 2019-03-22 PROCEDURE — 99214 OFFICE O/P EST MOD 30 MIN: CPT | Performed by: INTERNAL MEDICINE

## 2019-03-22 RX ORDER — LISINOPRIL 5 MG/1
5 TABLET ORAL 2 TIMES DAILY
Refills: 1 | COMMUNITY
Start: 2019-03-20 | End: 2019-10-25 | Stop reason: ALTCHOICE

## 2019-03-22 RX ORDER — BUPROPION HYDROCHLORIDE 150 MG/1
150 TABLET ORAL DAILY
Refills: 2 | COMMUNITY
Start: 2019-03-20 | End: 2019-10-25 | Stop reason: ALTCHOICE

## 2019-03-22 RX ORDER — POTASSIUM CHLORIDE 20 MEQ/1
20 TABLET, EXTENDED RELEASE ORAL DAILY
Qty: 30 TABLET | Refills: 3 | Status: SHIPPED | OUTPATIENT
Start: 2019-03-22 | End: 2019-08-14 | Stop reason: SDUPTHER

## 2019-03-22 RX ORDER — CLONAZEPAM 0.5 MG/1
1 TABLET ORAL 2 TIMES DAILY
Refills: 1 | COMMUNITY
Start: 2019-03-20 | End: 2020-09-30 | Stop reason: SDUPTHER

## 2019-03-22 NOTE — PROGRESS NOTES
Heart Failure Outpatient Progress Note - Sabi Collins 46 y o  male MRN: [id number]    @ Encounter: [id number]      Assessment/Plan:    Patient Active Problem List    Diagnosis Date Noted    Atherosclerosis of native arteries of right leg with ulceration of other part of foot ([place] [address] ) 03/22/2018     Priority: Low    Cardiomyopathy, dilated ([facility] ) 08/10/2017     Priority: Low    CHF, chronic ([place] [address] ) 06/28/2017     Priority: Low    HTN (hypertension) 06/28/2017     Priority: Low    Alcohol abuse 06/28/2017     Priority: Low    Tobacco abuse 06/28/2017     Priority: Low     1  Chronic Systolic CHF, Stage C  Etiology: DNICM, possible ETOH  Echo 1/8/18: EF: 10%, Stage C, NYHA 3  LHC 6/29/17 negative for obstructive CAD, LVEDP 30 mmhg  [place]emos:  2017 RHC; PA sat 67%, CI 1 9, PCW 25 mmHg  BiV ICD implanted 10/4/17 for native LBBB  msec:   2017 RHC; PA sat 67%, CI 1 9, PCW 25 mmHg  Weight 247 today    Neurohormonal Blockade:  --Beta-Blocker:coreg 6 25 mg BID  --ACEi, ARB or ARNi: lisinopril 5 mg daily  --Aldosterone Receptor Blocker:  --Diuretic: Lasix 40 mg BID    Sudden Cardiac Death Risk Reduction:  --ICD: 10/4/17- Jan 4 interrogation: 4 sec of VT/VR at 290 bpm- terminated on own    Electrical Resynchronization:  --Candidacy for BiV device: BiV placed in 10/4- 97 5% paced  Positive response: EF: to 25-30% and LVEDd down to 6 3 from 6 8 with BiV pacer    #  Etoh abuse history- down to 8 beers weekly- but recently up to much more lately  #  tobacco use- continued, did not tolerate Chantix- mood changed  #  MARRY- cpap  # PVD- LEADs: R CFA 50-75% stenosis, LLE with no sign diz  Healed ulcers on right toes  Likely vasospasm from Buerger's dz  # depression- on wellbutrin and Klonopin    TODAY'S PLAN:  For now I am happy he is back on his meds, starting to feel better, breathing better, moving around better     Start KDur 20 meq daily  --2g sodium diet  - Daily weights    HPI:   Charlyne Failing is a 46y o  year old male with a history of a cardiomyopathy diagnosed at [facility] in March 2017 presents from his PCP's office with chest pain  He presented to [facility] in March with progressive shortness of breath and a cough  He was found to have an elevated BNP and echo revealed an dialated cardiomyopathy with EF: 10% LVEDD: 6 6cm; with mild MR and mild TR  He was diuresed with IV lasix and started on metoprolol succinate  There was reportedly no ischemic workup done during that admission  He was fitted for a lifevest at the time of discharge  He was occasionally compliant with his lifevest but reportedly was compliant with his medications  He had a follow up echo after 3 months however he is unsure of the results but was being considered for a pacemaker  He was referred to EP at [facility] however did not continue under their care  He has been diagnosed with MARRY and is on CPAP  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  smokes 1ppd now but as much as 3ppd for the past 40 years  drinks extensively up to 1 case of beer plus hard liquor in a day  He has cut back since March  The last time he drank heavily was about a month ago  He was in the hospital in June with chest pain and cardiac cath was negative for obstructive CAD but LVEDP was elevated at 30 mmHg  He was discharged on LIfeVest  Does not want to wear  Since discharge he started feeling really bad again in the last 3 weeks again with a band sensation of chest pain  His appetite is poor and lost 37 lbs  Zero energy  Down to less than 10 cigarettes a day  Currently lives by himself  Grown adult children  Has a girlfriend that lives around 45 minutes ago  Â 10/27: RHC in Aug CI was 1 9 and PA sat 67% and wedge 24  He underwent BiV ICD placed Oct 4 ok  Drinking about 8 beers a week  Changed to [facility], was down to 3-4 beers a day    Interval History:  He was off his meds for many months - his decision   Started feeling very bad, then just recently went back on his meds  Lisinopril instead of Entresto  He was also drinking heavily and 3 packs a day smoking        Past Medical History:   Diagnosis Date    Cardiac disease     CHF (congestive heart failure) (HCC)     Dilated cardiomyopathy (HCC)     Hypertension        Review of Systems - Negative except no SOB, no pND, no edema  No syncope or near syncope  Fatigue    Allergies   Allergen Reactions    Chantix [Varenicline]            Current Outpatient Medications:     aspirin 81 mg chewable tablet, Chew 1 tablet (81 mg total) daily, Disp: 90 tablet, Rfl: 3    buPROPion (WELLBUTRIN XL) 150 mg 24 hr tablet, Take 150 mg by mouth daily, Disp: , Rfl: 2    carvedilol (COREG) 6 25 mg tablet, Take 6 25 mg by mouth 2 (two) times a day with meals, Disp: , Rfl:     clonazePAM (KlonoPIN) 0 5 mg tablet, Take 0 5 mg by mouth every 12 (twelve) hours, Disp: , Rfl: 1    furosemide (LASIX) 40 mg tablet, Take 40 mg by mouth 2 (two) times a day  , Disp: , Rfl:     lisinopril (ZESTRIL) 5 mg tablet, Take 5 mg by mouth 2 (two) times a day, Disp: , Rfl: 1    nitroglycerin (NITROSTAT) 0 4 mg SL tablet, Place 0 4 mg under the tongue every 5 (five) minutes as needed for chest pain, Disp: , Rfl:     busPIRone (BUSPAR) 10 mg tablet, Take 20 mg by mouth 2 (two) times a day, Disp: , Rfl:     Fluticasone Furoate (ARNUITY ELLIPTA) 100 MCG/ACT AEPB, Inhale 100 mcg, Disp: , Rfl:     Social History     Socioeconomic History    Marital status: Single     Spouse name: Not on file    Number of children: Not on file    Years of education: Not on file    Highest education level: Not on file   Occupational History    Not on file   Social Needs    Financial resource strain: Not on file    Food insecurity:     Worry: Not on file     Inability: Not on file    Transportation needs:     Medical: Not on file     Non-medical: Not on file   Tobacco Use    Smoking status: Current Every Day Smoker     Packs/day: 1 00 Years: 40 00     Pack years: 40 00     Types: Cigarettes    Smokeless tobacco: Former User   Substance and Sexual Activity    Alcohol use: Yes     Comment: socially    Drug use: No    Sexual activity: Not on file   Lifestyle    Physical activity:     Days per week: Not on file     Minutes per session: Not on file    Stress: Not on file   Relationships    Social connections:     Talks on phone: Not on file     Gets together: Not on file     Attends Sabianist service: Not on file     Active member of club or organization: Not on file     Attends meetings of clubs or organizations: Not on file     Relationship status: Not on file    Intimate partner violence:     Fear of current or ex partner: Not on file     Emotionally abused: Not on file     Physically abused: Not on file     Forced sexual activity: Not on file   Other Topics Concern    Not on file   Social History Narrative    Not on file       Family History   Problem Relation Age of Onset    No Known Problems Family     Diabetes Mother        Physical Exam:    Vitals: Blood pressure 116/76, pulse 93, height 6' 1" (1 854 m), weight 112 kg (247 lb), SpO2 97 %  , Body mass index is 32 59 kg/m² ,   Wt Readings from Last 3 Encounters:   03/22/19 112 kg (247 lb)   05/09/18 118 kg (261 lb)   03/22/18 117 kg (258 lb)       Physical Exam:  Vitals:    03/22/19 1631   BP: 116/76   BP Location: Right arm   Patient Position: Sitting   Cuff Size: Large   Pulse: 93   SpO2: 97%   Weight: 112 kg (247 lb)   Height: 6' 1" (1 854 m)       GEN: Bhumika Oakes appears well, alert and oriented x 3, pleasant and cooperative   HEENT: pupils equal, round, and reactive to light; extraocular muscles intact  NECK: supple, no carotid bruits   HEART: regular rhythm, normal S1 and S2, no murmurs, clicks, gallops or rubs, JVP is  down  LUNGS: clear to auscultation bilaterally; no wheezes, rales, or rhonchi   ABDOMEN: normal bowel sounds, soft, no tenderness, no distention  EXTREMITIES: peripheral pulses normal; no clubbing, cyanosis, or edema  NEURO: no focal findings   SKIN: normal without suspicious lesions on exposed skin    Labs & Results:    Lab Results   Component Value Date    CALCIUM 8 7 10/05/2017    K 3 9 10/05/2017    CO2 26 10/05/2017     10/05/2017    BUN 18 10/05/2017    CREATININE 0 96 10/05/2017     Lab Results   Component Value Date    WBC 9 28 10/05/2017    HGB 14 6 10/05/2017    HCT 40 9 10/05/2017    MCV 89 10/05/2017     10/05/2017     Lab Results   Component Value Date    NTBNP 3,463 (H) 06/28/2017      No results found for: CHOL  Lab Results   Component Value Date    HDL 54 06/28/2017     Lab Results   Component Value Date    LDLCALC 104 (H) 06/28/2017     Lab Results   Component Value Date    TRIG 131 06/28/2017     No results found for: CHOLHDL    Echocardiogram 1/8/18  LVEF: 25-30%  LVIDd: 6 3 cm  RV:      EKG personally reviewed by Cecilia Delatorre DO  Counseling / Coordination of Care  Total floor / unit time spent today 25 minutes  Greater than 50% of total time was spent with the patient and / or family counseling and / or coordination of care  A description of the counseling / coordination of care: 15      Thank you for the opportunity to participate in the care of this patient    CORONA REYNA [address] Lakeshia Robb

## 2019-06-24 ENCOUNTER — OFFICE VISIT (OUTPATIENT)
Dept: CARDIOLOGY CLINIC | Facility: CLINIC | Age: 49
End: 2019-06-24
Payer: COMMERCIAL

## 2019-06-24 VITALS
DIASTOLIC BLOOD PRESSURE: 82 MMHG | HEART RATE: 94 BPM | HEIGHT: 73 IN | SYSTOLIC BLOOD PRESSURE: 118 MMHG | WEIGHT: 265 LBS | BODY MASS INDEX: 35.12 KG/M2 | OXYGEN SATURATION: 96 %

## 2019-06-24 DIAGNOSIS — G47.33 OSA ON CPAP: Primary | ICD-10-CM

## 2019-06-24 DIAGNOSIS — I50.22 CHRONIC SYSTOLIC CHF (CONGESTIVE HEART FAILURE), NYHA CLASS 2 (HCC): ICD-10-CM

## 2019-06-24 DIAGNOSIS — I42.8 NICM (NONISCHEMIC CARDIOMYOPATHY) (HCC): ICD-10-CM

## 2019-06-24 DIAGNOSIS — Z99.89 OSA ON CPAP: Primary | ICD-10-CM

## 2019-06-24 PROCEDURE — 93000 ELECTROCARDIOGRAM COMPLETE: CPT | Performed by: INTERNAL MEDICINE

## 2019-06-24 PROCEDURE — 99214 OFFICE O/P EST MOD 30 MIN: CPT | Performed by: INTERNAL MEDICINE

## 2019-06-24 RX ORDER — CARVEDILOL 12.5 MG/1
12.5 TABLET ORAL 2 TIMES DAILY WITH MEALS
Qty: 60 TABLET | Refills: 3 | Status: SHIPPED | OUTPATIENT
Start: 2019-06-24 | End: 2021-03-12 | Stop reason: ALTCHOICE

## 2019-06-24 RX ORDER — TIOTROPIUM BROMIDE INHALATION SPRAY 3.12 UG/1
SPRAY, METERED RESPIRATORY (INHALATION)
COMMUNITY
Start: 2019-06-21 | End: 2020-10-27 | Stop reason: ALTCHOICE

## 2019-08-14 DIAGNOSIS — I50.22 CHRONIC SYSTOLIC CHF (CONGESTIVE HEART FAILURE), NYHA CLASS 3 (HCC): ICD-10-CM

## 2019-08-14 DIAGNOSIS — I42.8 NICM (NONISCHEMIC CARDIOMYOPATHY) (HCC): ICD-10-CM

## 2019-08-14 RX ORDER — POTASSIUM CHLORIDE 1500 MG/1
TABLET, EXTENDED RELEASE ORAL
Qty: 30 TABLET | Refills: 3 | Status: SHIPPED | OUTPATIENT
Start: 2019-08-14 | End: 2020-10-12 | Stop reason: SDUPTHER

## 2019-10-10 ENCOUNTER — HOSPITAL ENCOUNTER (OUTPATIENT)
Dept: NON INVASIVE DIAGNOSTICS | Facility: CLINIC | Age: 49
Discharge: HOME | End: 2019-10-10
Payer: COMMERCIAL

## 2019-10-10 DIAGNOSIS — I50.22 CHRONIC SYSTOLIC CHF (CONGESTIVE HEART FAILURE), NYHA CLASS 2 (HCC): ICD-10-CM

## 2019-10-10 PROCEDURE — 93306 TTE W/DOPPLER COMPLETE: CPT

## 2019-10-10 PROCEDURE — 93306 TTE W/DOPPLER COMPLETE: CPT | Performed by: INTERNAL MEDICINE

## 2019-10-23 ENCOUNTER — TRANSCRIBE ORDERS (OUTPATIENT)
Dept: ADMINISTRATIVE | Facility: HOSPITAL | Age: 49
End: 2019-10-23

## 2019-10-23 ENCOUNTER — APPOINTMENT (OUTPATIENT)
Dept: LAB | Facility: HOSPITAL | Age: 49
End: 2019-10-23
Payer: COMMERCIAL

## 2019-10-23 DIAGNOSIS — I10 BENIGN ESSENTIAL HYPERTENSION: Primary | ICD-10-CM

## 2019-10-23 DIAGNOSIS — I50.22 CHRONIC SYSTOLIC CHF (CONGESTIVE HEART FAILURE), NYHA CLASS 2 (HCC): ICD-10-CM

## 2019-10-23 DIAGNOSIS — I10 BENIGN ESSENTIAL HYPERTENSION: ICD-10-CM

## 2019-10-23 LAB
ALBUMIN SERPL BCP-MCNC: 3.9 G/DL (ref 3.5–5)
ALP SERPL-CCNC: 58 U/L (ref 46–116)
ALT SERPL W P-5'-P-CCNC: 23 U/L (ref 12–78)
ANION GAP SERPL CALCULATED.3IONS-SCNC: 5 MMOL/L (ref 4–13)
AST SERPL W P-5'-P-CCNC: 12 U/L (ref 5–45)
BASOPHILS # BLD AUTO: 0.07 THOUSANDS/ΜL (ref 0–0.1)
BASOPHILS NFR BLD AUTO: 1 % (ref 0–1)
BILIRUB SERPL-MCNC: 0.43 MG/DL (ref 0.2–1)
BUN SERPL-MCNC: 26 MG/DL (ref 5–25)
CALCIUM SERPL-MCNC: 9.3 MG/DL (ref 8.3–10.1)
CHLORIDE SERPL-SCNC: 108 MMOL/L (ref 100–108)
CHOLEST SERPL-MCNC: 204 MG/DL (ref 50–200)
CO2 SERPL-SCNC: 28 MMOL/L (ref 21–32)
CREAT SERPL-MCNC: 1.18 MG/DL (ref 0.6–1.3)
EOSINOPHIL # BLD AUTO: 0.23 THOUSAND/ΜL (ref 0–0.61)
EOSINOPHIL NFR BLD AUTO: 2 % (ref 0–6)
ERYTHROCYTE [DISTWIDTH] IN BLOOD BY AUTOMATED COUNT: 13.2 % (ref 11.6–15.1)
GFR SERPL CREATININE-BSD FRML MDRD: 71 ML/MIN/1.73SQ M
GLUCOSE P FAST SERPL-MCNC: 115 MG/DL (ref 65–99)
HCT VFR BLD AUTO: 47.9 % (ref 36.5–49.3)
HDLC SERPL-MCNC: 49 MG/DL
HGB BLD-MCNC: 15.9 G/DL (ref 12–17)
IMM GRANULOCYTES # BLD AUTO: 0.02 THOUSAND/UL (ref 0–0.2)
IMM GRANULOCYTES NFR BLD AUTO: 0 % (ref 0–2)
LDLC SERPL CALC-MCNC: 114 MG/DL (ref 0–100)
LYMPHOCYTES # BLD AUTO: 2.41 THOUSANDS/ΜL (ref 0.6–4.47)
LYMPHOCYTES NFR BLD AUTO: 24 % (ref 14–44)
MAGNESIUM SERPL-MCNC: 2 MG/DL (ref 1.6–2.6)
MCH RBC QN AUTO: 31.3 PG (ref 26.8–34.3)
MCHC RBC AUTO-ENTMCNC: 33.2 G/DL (ref 31.4–37.4)
MCV RBC AUTO: 94 FL (ref 82–98)
MONOCYTES # BLD AUTO: 0.83 THOUSAND/ΜL (ref 0.17–1.22)
MONOCYTES NFR BLD AUTO: 8 % (ref 4–12)
NEUTROPHILS # BLD AUTO: 6.48 THOUSANDS/ΜL (ref 1.85–7.62)
NEUTS SEG NFR BLD AUTO: 65 % (ref 43–75)
NONHDLC SERPL-MCNC: 155 MG/DL
NRBC BLD AUTO-RTO: 0 /100 WBCS
NT-PROBNP SERPL-MCNC: 294 PG/ML
PLATELET # BLD AUTO: 234 THOUSANDS/UL (ref 149–390)
PMV BLD AUTO: 10.1 FL (ref 8.9–12.7)
POTASSIUM SERPL-SCNC: 4.6 MMOL/L (ref 3.5–5.3)
PROT SERPL-MCNC: 7.1 G/DL (ref 6.4–8.2)
RBC # BLD AUTO: 5.08 MILLION/UL (ref 3.88–5.62)
SODIUM SERPL-SCNC: 141 MMOL/L (ref 136–145)
TRIGL SERPL-MCNC: 206 MG/DL
WBC # BLD AUTO: 10.04 THOUSAND/UL (ref 4.31–10.16)

## 2019-10-23 PROCEDURE — 80061 LIPID PANEL: CPT

## 2019-10-23 PROCEDURE — 83735 ASSAY OF MAGNESIUM: CPT

## 2019-10-23 PROCEDURE — 83880 ASSAY OF NATRIURETIC PEPTIDE: CPT

## 2019-10-23 PROCEDURE — 36415 COLL VENOUS BLD VENIPUNCTURE: CPT | Performed by: INTERNAL MEDICINE

## 2019-10-23 PROCEDURE — 80053 COMPREHEN METABOLIC PANEL: CPT

## 2019-10-23 PROCEDURE — 85025 COMPLETE CBC W/AUTO DIFF WBC: CPT | Performed by: INTERNAL MEDICINE

## 2019-10-25 ENCOUNTER — OFFICE VISIT (OUTPATIENT)
Dept: CARDIOLOGY CLINIC | Facility: CLINIC | Age: 49
End: 2019-10-25
Payer: COMMERCIAL

## 2019-10-25 VITALS
SYSTOLIC BLOOD PRESSURE: 110 MMHG | WEIGHT: 258 LBS | OXYGEN SATURATION: 95 % | HEART RATE: 83 BPM | BODY MASS INDEX: 34.19 KG/M2 | HEIGHT: 73 IN | DIASTOLIC BLOOD PRESSURE: 80 MMHG

## 2019-10-25 DIAGNOSIS — I42.8 NICM (NONISCHEMIC CARDIOMYOPATHY) (HCC): ICD-10-CM

## 2019-10-25 DIAGNOSIS — G47.33 OSA ON CPAP: ICD-10-CM

## 2019-10-25 DIAGNOSIS — I50.22 CHRONIC SYSTOLIC CHF (CONGESTIVE HEART FAILURE), NYHA CLASS 2 (HCC): Primary | ICD-10-CM

## 2019-10-25 DIAGNOSIS — Z99.89 OSA ON CPAP: ICD-10-CM

## 2019-10-25 PROCEDURE — 99214 OFFICE O/P EST MOD 30 MIN: CPT | Performed by: INTERNAL MEDICINE

## 2019-10-25 NOTE — PROGRESS NOTES
Heart Failure Outpatient Progress Note - Benson Zepeda 44 y o  male MRN: [id number]    @ Encounter: [id number]      Assessment/Plan:    Patient Active Problem List    Diagnosis Date Noted    Atherosclerosis of native arteries of right leg with ulceration of other part of foot ([place] [address] ) 03/22/2018     Priority: Low    Cardiomyopathy, dilated ([facility] ) 08/10/2017     Priority: Low    CHF, chronic ([place] [address] ) 06/28/2017     Priority: Low    HTN (hypertension) 06/28/2017     Priority: Low    Alcohol abuse 06/28/2017     Priority: Low    Tobacco abuse 06/28/2017     Priority: Low     1  Chronic Systolic CHF, Stage C, NYHA 2  Etiology: DNICM, possible ETOH    Echo 10/10/19:  LVEF: 25%, mild LVH  LVEDd: 7 4 cm  RV: normal  MV: moderate RM    Echo 1/8/18: EF: 25%,LVEDd: 6 3 cm  Echo 9/27/17: EF: 10%, LVEDd: 6 75 cm    LHC 6/29/17 negative for obstructive CAD, LVEDP 30 mmhg    [place]:  2017 RHC; PA sat 67%, CI 1 9, PCW 25 mmHg    Weight: 258 lbs down from 265 lbs- which was up from 247 today    Neurohormonal Blockade:  --Beta-Blocker:coreg 12 5 mg BID  --ACEi, ARB or ARNi: lisinopril 5 mg daily  --Aldosterone Receptor Blocker:  --Diuretic: Lasix 40 mg BID    Sudden Cardiac Death Risk Reduction:  --ICD: 10/4/17- Jan 4 interrogation: 4 sec of VT/VR at 290 bpm- terminated on own    Electrical Resynchronization:  Native  msec  --BiV placed in 10/4/17-   Positive response: EF: to 25-30% and LVEDd down to 6 3 from 6 8 with BiV pacer  Interrogation 7/3/19: Optivol normal  BVP 97%    Advanced therapies  Drinking and  Smoking    #  Etoh abuse history- down to 8 beers weekly- but recently up to much more lately  #  tobacco use- continued, did not tolerate Chantix- mood changed  #  MARRY- cpap  # PVD- LEADs: R CFA 50-75% stenosis, LLE with no sign diz     Healed ulcers on right toes  Likely vasospasm from Buerger's dz  # lipids 10/23/19: , HDL 49  # depression- on wellbutrin and Klonopin    TODAY'S PLAN:  Has to have better eating habits- eating [employer], drinks ETOH, smokes  Switch lisinopril to Entresto  - Daily weights    HPI:   Yousif Pat is a 44y o  year old male with a history of a cardiomyopathy diagnosed at [facility] in March 2017 presents from his PCP's office with chest pain  He presented to [facility] in March with progressive shortness of breath and a cough  He was found to have an elevated BNP and echo revealed an dialated cardiomyopathy with EF: 10% LVEDD: 6 6cm; with mild MR and mild TR  He was diuresed with IV lasix and started on metoprolol succinate  There was reportedly no ischemic workup done during that admission  He was fitted for a lifevest at the time of discharge  He was occasionally compliant with his lifevest but reportedly was compliant with his medications  He had a follow up echo after 3 months however he is unsure of the results but was being considered for a pacemaker  He was referred to EP at [place] however did not continue under their care  He has been diagnosed with MARRY and is on CPAP  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  smokes 1ppd now but as much as 3ppd for the past 40 years  drinks extensively up to 1 case of beer plus hard liquor in a day  He has cut back since March  The last time he drank heavily was about a month ago  He was in the hospital in June with chest pain and cardiac cath was negative for obstructive CAD but LVEDP was elevated at 30 mmHg  He was discharged on LIfeVest  Does not want to wear  Since discharge he started feeling really bad again in the last 3 weeks again with a band sensation of chest pain  His appetite is poor and lost 37 lbs  Zero energy  Down to less than 10 cigarettes a day  Currently lives by himself  Grown adult children  Has a girlfriend that lives around 45 minutes ago  Â 10/27: RHC in Aug CI was 1 9 and PA sat 67% and wedge 24  He underwent BiV ICD placed Oct 4 ok  Drinking about 8 beers a week  Changed to [facility], was down to 3-4 beers a day    He was off his meds for many months - his decision  Started feeling very bad, then just recently went back on his meds  Lisinopril instead of Entresto  He was also drinking heavily and 3 packs a day smoking    Interval History:  Echo 10/10/19:  LVEF: 25%, mild LVH  LVEDd: 7 4 cm  RV: normal  MV: moderate RM    Previously had good remodeling with BiV but echo this October EF down to 25% and LVEDd up to 7 4 cm despite high percentage of pacing  Was drinking etoh more- stopped about 1 5 months ago- but was drinking a lot  Eating poorly- not much of an appetite    Doesn't feel well      Review of Systems   Constitutional: Positive for unexpected weight change (18 lbs)  Negative for activity change, appetite change and fatigue  HENT: Negative for congestion and nosebleeds  Eyes: Negative  Respiratory: Positive for chest tightness and shortness of breath  Negative for cough  Cardiovascular: Negative for chest pain, palpitations and leg swelling  Gastrointestinal: Negative for abdominal distention  Endocrine: Negative  Genitourinary: Negative  Musculoskeletal: Negative  Skin: Negative  Neurological: Negative for dizziness, syncope and weakness  Hematological: Negative  Psychiatric/Behavioral: Negative  Past Medical History:   Diagnosis Date    Cardiac disease     [facility]est[facility] failure) [facility] )     Dilated cardiomyopathy (HCC)     Hypertension        Allergies   Allergen Reactions    Chantix [Varenicline]            Current Outpatient Medications:     aspirin 81 mg chewable tablet, Chew 1 tablet (81 mg total) daily, Disp: 90 tablet, Rfl: 3    carvedilol (COREG) 12 5 mg tablet, Take 1 tablet (12 5 mg total) by mouth 2 (two) times a day with meals, Disp: 60 tablet, Rfl: 3    clonazePAM (KlonoPIN) 0 5 mg tablet, Take 0 5 mg by mouth every 12 (twelve) hours , Disp: , Rfl: 1    furosemide (LASIX) 40 mg tablet, Take 40 mg by mouth 2 (two) times a day  , Disp: , Rfl:     KLOR-CON M20 20 MEQ tablet, TAKE 1 TABLET BY MOUTH EVERY DAY, Disp: 30 tablet, Rfl: 3    lisinopril (ZESTRIL) 5 mg tablet, Take 5 mg by mouth 2 (two) times a day, Disp: , Rfl: 1    nitroglycerin (NITROSTAT) 0 4 mg SL tablet, Place 0 4 mg under the tongue every 5 (five) minutes as needed for chest pain, Disp: , Rfl:     SPIRIVA RESPIMAT 2 5 MCG/ACT AERS inhaler, , Disp: , Rfl:     Social History     Socioeconomic History    Marital status: Single     Spouse name: Not on file    Number of children: Not on file    Years of education: Not on file    Highest education level: Not on file   Occupational History    Not on file   Social Needs    Financial resource strain: Not on file    Food insecurity:     Worry: Not on file     Inability: Not on file    Transportation needs:     Medical: Not on file     Non-medical: Not on file   Tobacco Use    Smoking status: Current Every Day Smoker     Packs/day: 1 00     Years: 40 00     Pack years: 40 00     Types: Cigarettes    Smokeless tobacco: Former User   Substance and Sexual Activity    Alcohol use: Yes     Comment: socially    Drug use: No    Sexual activity: Not on file   Lifestyle    Physical activity:     Days per week: Not on file     Minutes per session: Not on file    Stress: Not on file   Relationships    Social connections:     Talks on phone: Not on file     Gets together: Not on file     Attends Uatsdin service: Not on file     Active member of club or organization: Not on file     Attends meetings of clubs or organizations: Not on file     Relationship status: Not on file    Intimate partner violence:     Fear of current or ex partner: Not on file     Emotionally abused: Not on file     Physically abused: Not on file     Forced sexual activity: Not on file   Other Topics Concern    Not on file   Social History Narrative    Not on file       Family History   Problem Relation Age of Onset    No Known Problems Family     Diabetes Mother        Physical Exam:    Vitals: Blood pressure 110/80, pulse 83, height 6' 1" (1 854 m), weight 117 kg (258 lb), SpO2 95 %  , Body mass index is 34 04 kg/m² ,   Wt Readings from Last 3 Encounters:   10/25/19 117 kg (258 lb)   06/24/19 120 kg (265 lb)   03/22/19 112 kg (247 lb)       Physical Exam:  Vitals:    10/25/19 1258   BP: 110/80   BP Location: Left arm   Patient Position: Sitting   Cuff Size: Standard   Pulse: 83   SpO2: 95%   Weight: 117 kg (258 lb)   Height: 6' 1" (1 854 m)       Physical Exam   Constitutional: He is oriented to person, place, and time  He appears well-developed and well-nourished  HENT:   Head: Normocephalic and atraumatic  Eyes: Pupils are equal, round, and reactive to light  EOM are normal    Neck: Normal range of motion  No JVD present  Cardiovascular: Normal rate, regular rhythm and normal heart sounds  No murmur heard  Pulmonary/Chest: Effort normal and breath sounds normal  He has no rales  Abdominal: Soft  Bowel sounds are normal  He exhibits no distension  Musculoskeletal: Normal range of motion  He exhibits no edema  Neurological: He is alert and oriented to person, place, and time  Skin: Skin is warm and dry  He is not diaphoretic  Psychiatric: He has a normal mood and affect         Labs & Results:    Lab Results   Component Value Date    CALCIUM 9 3 10/23/2019    K 4 6 10/23/2019    CO2 28 10/23/2019     10/23/2019    BUN 26 (H) 10/23/2019    CREATININE 1 18 10/23/2019     Lab Results   Component Value Date    WBC 10 04 10/23/2019    HGB 15 9 10/23/2019    HCT 47 9 10/23/2019    MCV 94 10/23/2019     10/23/2019     Lab Results   Component Value Date    NTBNP 294 (H) 10/23/2019      No results found for: CHOL  Lab Results   Component Value Date    HDL 49 10/23/2019    HDL 54 06/28/2017     Lab Results   Component Value Date    LDLCALC 114 (H) 10/23/2019    LDLCALC 104 (H) 06/28/2017     Lab Results Component Value Date    TRIG 206 (H) 10/23/2019    TRIG 131 06/28/2017     No results found for: CHOLHDL    Echocardiogram 1/8/18  LVEF: 25-30%  LVIDd: 6 3 cm  RV:      EKG personally reviewed by Zhen Like, DO  Counseling / Coordination of Care  Total floor / unit time spent today 25 minutes  Greater than 50% of total time was spent with the patient and / or family counseling and / or coordination of care  A description of the counseling / coordination of care: 15      Thank you for the opportunity to participate in the care of this patient    COR[address] REYNA [address] Lakes[place]ia Robb

## 2019-10-25 NOTE — PATIENT INSTRUCTIONS
Stop lisinopril  Don't take lisinopril tomorrow    Start Entresto Sunday morning    Please check daily weights and contact the heart failure program at [phone] if you gain 3 lb in one day or 5 lb in one week  Please limit daily sodium intake to 2000 mg daily  Please limit daily fluid intake to 2000 ml daily  Bring complete list of medications to your follow up appointment

## 2019-12-18 ENCOUNTER — OFFICE VISIT (OUTPATIENT)
Dept: CARDIOLOGY CLINIC | Facility: CLINIC | Age: 49
End: 2019-12-18
Payer: COMMERCIAL

## 2019-12-18 VITALS
HEART RATE: 88 BPM | BODY MASS INDEX: 35.39 KG/M2 | OXYGEN SATURATION: 94 % | WEIGHT: 267 LBS | HEIGHT: 73 IN | DIASTOLIC BLOOD PRESSURE: 60 MMHG | SYSTOLIC BLOOD PRESSURE: 98 MMHG

## 2019-12-18 DIAGNOSIS — Z99.89 OSA ON CPAP: ICD-10-CM

## 2019-12-18 DIAGNOSIS — E78.5 HYPERLIPIDEMIA LDL GOAL <100: ICD-10-CM

## 2019-12-18 DIAGNOSIS — G47.33 OSA ON CPAP: ICD-10-CM

## 2019-12-18 DIAGNOSIS — I42.0 CARDIOMYOPATHY, DILATED (HCC): ICD-10-CM

## 2019-12-18 DIAGNOSIS — I50.42 CHRONIC COMBINED SYSTOLIC AND DIASTOLIC CONGESTIVE HEART FAILURE (HCC): Primary | ICD-10-CM

## 2019-12-18 PROCEDURE — 99214 OFFICE O/P EST MOD 30 MIN: CPT | Performed by: INTERNAL MEDICINE

## 2019-12-18 RX ORDER — LAMOTRIGINE 100 MG/1
100 TABLET ORAL 2 TIMES DAILY
Refills: 0 | COMMUNITY
Start: 2019-12-13 | End: 2020-12-18 | Stop reason: SDUPTHER

## 2019-12-18 NOTE — PATIENT INSTRUCTIONS
No change in meds    If you having a bad day with your vision or lightheadedness, hold diuretic that day    Try decreasing lasix to once daily

## 2019-12-18 NOTE — PROGRESS NOTES
Heart Failure Outpatient Progress Note - Irma Engel 44 y o  male MRN: [id number]    @ Encounter: [id number]      Assessment/Plan:    Patient Active Problem List    Diagnosis Date Noted    Atherosclerosis of native arteries of right leg with ulceration of other part of foot ([facility] ) 03/22/2018     Priority: Low    Cardiomyopathy, dilated ([facility] ) 08/10/2017     Priority: Low    CHF, chronic ([place] [id number] ) 06/28/2017     Priority: Low    HTN (hypertension) 06/28/2017     Priority: Low    Alcohol abuse 06/28/2017     Priority: Low    Tobacco abuse 06/28/2017     Priority: Low     1  Chronic Systolic CHF, Stage C, NYHA 2  Etiology: DNICM, possible ETOH    Echo 10/10/19:  LVEF: 25%, mild LVH  LVEDd: 7 4 cm  RV: normal  MV: moderate RM    Echo 1/8/18: EF: 25%,LVEDd: 6 3 cm  Echo 9/27/17: EF: 10%, LVEDd: 6 75 cm    LHC 6/29/17 negative for obstructive CAD, LVEDP 30 mmhg    [place]:  2017 RHC; PA sat 67%, CI 1 9, PCW 25 mmHg    Weight: 258 lbs down from 265 lbs- 267 lbs    Neurohormonal Blockade:  --Beta-Blocker:coreg 12 5 mg BID  --ACEi, ARB or ARNi: entresto 24/26 mg BID  --Aldosterone Receptor Blocker:  --Diuretic: Lasix 40 mg BID    Sudden Cardiac Death Risk Reduction:  --ICD: 10/4/17- Jan 4 interrogation: 4 sec of VT/VR at 290 bpm- terminated on own    Electrical Resynchronization:  Native  msec  --BiV placed in 10/4/17-   Positive response: EF: to 25-30% and LVEDd down to 6 3 from 6 8 with BiV pacer  Interrogation 7/3/19: Optivol normal  BVP 97%    Advanced therapies  Drinking and  Smoking    #  Etoh abuse history- down to 8 beers weekly- but recently up to much more lately  #  tobacco use- continued, did not tolerate Chantix- mood changed  #  MARRY- cpap  # PVD- LEADs: R CFA 50-75% stenosis, LLE with no sign diz     Healed ulcers on right toes  Likely vasospasm from Buerger's dz  # lipids 10/23/19: , HDL 49  # depression- on wellbutrin and Klonopin    TODAY'S PLAN:  Previously had good remodeling with BiV but echo this October EF down to 25% and LVEDd up to 7 4 cm despite high percentage of pacing  MRI compatible device - needs to talk with MRI clinic   His episodes of decreased vision sound more like low BP, likely volume related with Entresto- try decreasing lasix to daily  - Daily weights    HPI:   Georgia Ferrari is a 44y o  year old male with a history of a cardiomyopathy diagnosed at [facility] in March 2017 presents from his PCP's office with chest pain  He presented to [facility] in March with progressive shortness of breath and a cough  He was found to have an elevated BNP and echo revealed an dialated cardiomyopathy with EF: 10% LVEDD: 6 6cm; with mild MR and mild TR  He was diuresed with IV lasix and started on metoprolol succinate  There was reportedly no ischemic workup done during that admission  He was fitted for a lifevest at the time of discharge  He was occasionally compliant with his lifevest but reportedly was compliant with his medications  He had a follow up echo after 3 months however he is unsure of the results but was being considered for a pacemaker  He was referred to EP at [place] however did not continue under their care  He has been diagnosed with MARRY and is on CPAP  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  smokes 1ppd now but as much as 3ppd for the past 40 years  drinks extensively up to 1 case of beer plus hard liquor in a day  He has cut back since March  The last time he drank heavily was about a month ago  He was in the hospital in June with chest pain and cardiac cath was negative for obstructive CAD but LVEDP was elevated at 30 mmHg  He was discharged on LIfeVest  Does not want to wear  Since discharge he started feeling really bad again in the last 3 weeks again with a band sensation of chest pain  His appetite is poor and lost 37 lbs  Zero energy  Down to less than 10 cigarettes a day  Currently lives by himself  Grown adult children   Has a girlfriend that lives around 45 minutes ago  Â 10/27: RHC in Aug CI was 1 9 and PA sat 67% and wedge 24  He underwent BiV ICD placed Oct 4 ok  Drinking about 8 beers a week  Changed to [facility], was down to 3-4 beers a day    He was off his meds for many months - his decision  Started feeling very bad, then just recently went back on his meds  Lisinopril instead of Entresto  He was also drinking heavily and 3 packs a day smoking    Interval History:  Was eating poorly and drinking more ETOH  Changed lisinopril to navabi like he is urinating a lot  Not weighing himself  Getting chest pains, weight gain    Drinking about 2 beers daily  Feels presyncopal at time  Review of Systems   Constitutional: Positive for unexpected weight change (18 lbs)  Negative for activity change, appetite change and fatigue  Fever:  up a few pounds  HENT: Negative for congestion and nosebleeds  Worsening eye sight   Eyes: Negative  Respiratory: Positive for chest tightness  Negative for cough and shortness of breath  Cardiovascular: Negative for chest pain, palpitations and leg swelling  Gastrointestinal: Negative for abdominal distention  Endocrine: Negative  Genitourinary: Negative  Musculoskeletal: Negative  Skin: Negative  Neurological: Negative for dizziness, syncope and weakness  Hematological: Negative  Psychiatric/Behavioral: Negative  Past Medical History:   Diagnosis Date    Cardiac disease     [facility]est[facility]) [facility] )     Dilated cardiomyopathy (HCC)     Hypertension        Allergies   Allergen Reactions    Chantix [Varenicline]            Current Outpatient Medications:     aspirin 81 mg chewable tablet, Chew 1 tablet (81 mg total) daily, Disp: 90 tablet, Rfl: 3    carvedilol (COREG) 12 5 mg tablet, Take 1 tablet (12 5 mg total) by mouth 2 (two) times a day with meals, Disp: 60 tablet, Rfl: 3    clonazePAM (KlonoPIN) 0 5 mg tablet, Take 0 5 mg by mouth every 12 (twelve) hours , Disp: , Rfl: 1    furosemide (LASIX) 40 mg tablet, Take 40 mg by mouth 2 (two) times a day  , Disp: , Rfl:     KLOR-CON M20 20 MEQ tablet, TAKE 1 TABLET BY MOUTH EVERY DAY, Disp: 30 tablet, Rfl: 3    sacubitril-valsartan (ENTRESTO) 24-26 MG TABS, Take 1 tablet by mouth 2 (two) times a day, Disp: 60 tablet, Rfl: 3    SPIRIVA RESPIMAT 2 5 MCG/ACT AERS inhaler, , Disp: , Rfl:     lamoTRIgine (LaMICtal) 100 mg tablet, Take 100 mg by mouth 2 (two) times a day, Disp: , Rfl: 0    nitroglycerin (NITROSTAT) 0 4 mg SL tablet, Place 0 4 mg under the tongue every 5 (five) minutes as needed for chest pain, Disp: , Rfl:     Social History     Socioeconomic History    Marital status: Single     Spouse name: Not on file    Number of children: Not on file    Years of education: Not on file    Highest education level: Not on file   Occupational History    Not on file   Social Needs    Financial resource strain: Not on file    Food insecurity:     Worry: Not on file     Inability: Not on file    Transportation needs:     Medical: Not on file     Non-medical: Not on file   Tobacco Use    Smoking status: Current Every Day Smoker     Packs/day: 1 00     Years: 40 00     Pack years: 40 00     Types: Cigarettes    Smokeless tobacco: Former User   Substance and Sexual Activity    Alcohol use: Yes     Comment: socially    Drug use: No    Sexual activity: Not on file   Lifestyle    Physical activity:     Days per week: Not on file     Minutes per session: Not on file    Stress: Not on file   Relationships    Social connections:     Talks on phone: Not on file     Gets together: Not on file     Attends [employer] service: Not on file     Active member of club or organization: Not on file     Attends meetings of clubs or organizations: Not on file     Relationship status: Not on file    Intimate partner violence:     Fear of current or ex partner: Not on file     Emotionally abused: Not on file     Physically abused: Not on file     Forced sexual activity: Not on file   Other Topics Concern    Not on file   Social History Narrative    Not on file       Family History   Problem Relation Age of Onset    No Known Problems Family     Diabetes Mother        Physical Exam:    Vitals: Blood pressure 98/60, pulse 88, height 6' 1" (1 854 m), weight 121 kg (267 lb), SpO2 94 %  , Body mass index is 35 23 kg/m² ,   Wt Readings from Last 3 Encounters:   12/18/19 121 kg (267 lb)   10/25/19 117 kg (258 lb)   06/24/19 120 kg (265 lb)       Physical Exam:  Vitals:    12/18/19 1545   BP: 98/60   BP Location: Right arm   Patient Position: Sitting   Cuff Size: Large   Pulse: 88   SpO2: 94%   Weight: 121 kg (267 lb)   Height: 6' 1" (1 854 m)       Physical Exam   Constitutional: He is oriented to person, place, and time  He appears well-developed and well-nourished  HENT:   Head: Normocephalic and atraumatic  Eyes: Pupils are equal, round, and reactive to light  EOM are normal    Neck: Normal range of motion  No JVD present  Cardiovascular: Normal rate, regular rhythm and normal heart sounds  No murmur heard  Pulmonary/Chest: Effort normal and breath sounds normal  He has no rales  Abdominal: Soft  Bowel sounds are normal  He exhibits no distension  Musculoskeletal: Normal range of motion  He exhibits no edema  Neurological: He is alert and oriented to person, place, and time  Skin: Skin is warm and dry  He is not diaphoretic  Psychiatric: He has a normal mood and affect         Labs & Results:    Lab Results   Component Value Date    CALCIUM 9 3 10/23/2019    K 4 6 10/23/2019    CO2 28 10/23/2019     10/23/2019    BUN 26 (H) 10/23/2019    CREATININE 1 18 10/23/2019     Lab Results   Component Value Date    WBC 10 04 10/23/2019    HGB 15 9 10/23/2019    HCT 47 9 10/23/2019    MCV 94 10/23/2019     10/23/2019     Lab Results   Component Value Date    NTBNP 294 (H) 10/23/2019      No results found for: CHOL  Lab Results   Component Value Date    HDL 49 10/23/2019    HDL 54 06/28/2017     Lab Results   Component Value Date    LDLCALC 114 (H) 10/23/2019    LDLCALC 104 (H) 06/28/2017     Lab Results   Component Value Date    TRIG 206 (H) 10/23/2019    TRIG 131 06/28/2017     No results found for: CHOLHDL    Echocardiogram 1/8/18  LVEF: 25-30%  LVIDd: 6 3 cm  RV:      EKG personally reviewed by Sky Hilton DO  Counseling / Coordination of Care  Total floor / unit time spent today 25 minutes  Greater than 50% of total time was spent with the patient and / or family counseling and / or coordination of care  A description of the counseling / coordination of care: 15      Thank you for the opportunity to participate in the care of this patient    CORONA REYNA [address] Lakeshia Robb

## 2020-06-02 ENCOUNTER — TELEMEDICINE (OUTPATIENT)
Dept: CARDIOLOGY CLINIC | Facility: CLINIC | Age: 50
End: 2020-06-02
Payer: COMMERCIAL

## 2020-06-02 DIAGNOSIS — I50.42 CHRONIC COMBINED SYSTOLIC AND DIASTOLIC CONGESTIVE HEART FAILURE (HCC): Primary | ICD-10-CM

## 2020-06-02 DIAGNOSIS — I42.0 CARDIOMYOPATHY, DILATED (HCC): ICD-10-CM

## 2020-06-02 DIAGNOSIS — I50.22 CHRONIC SYSTOLIC CHF (CONGESTIVE HEART FAILURE), NYHA CLASS 2 (HCC): ICD-10-CM

## 2020-06-02 DIAGNOSIS — Z99.89 OSA ON CPAP: ICD-10-CM

## 2020-06-02 DIAGNOSIS — G47.33 OSA ON CPAP: ICD-10-CM

## 2020-06-02 PROCEDURE — 99214 OFFICE O/P EST MOD 30 MIN: CPT | Performed by: INTERNAL MEDICINE

## 2020-06-03 ENCOUNTER — IN-CLINIC DEVICE VISIT (OUTPATIENT)
Dept: CARDIOLOGY CLINIC | Facility: CLINIC | Age: 50
End: 2020-06-03
Payer: COMMERCIAL

## 2020-06-03 DIAGNOSIS — Z95.810 PRESENCE OF AUTOMATIC CARDIOVERTER/DEFIBRILLATOR (AICD): Primary | ICD-10-CM

## 2020-06-03 PROCEDURE — 93284 PRGRMG EVAL IMPLANTABLE DFB: CPT | Performed by: INTERNAL MEDICINE

## 2020-07-06 ENCOUNTER — APPOINTMENT (OUTPATIENT)
Dept: LAB | Facility: CLINIC | Age: 50
End: 2020-07-06
Payer: COMMERCIAL

## 2020-07-06 ENCOUNTER — TRANSCRIBE ORDERS (OUTPATIENT)
Dept: URGENT CARE | Facility: CLINIC | Age: 50
End: 2020-07-06

## 2020-07-06 DIAGNOSIS — F41.9 ANXIETY HYPERVENTILATION: Primary | ICD-10-CM

## 2020-07-06 DIAGNOSIS — I50.9 CHRONIC HEART FAILURE, UNSPECIFIED HEART FAILURE TYPE (HCC): ICD-10-CM

## 2020-07-06 DIAGNOSIS — F32.A PRESENILE DEMENTIA WITH DEPRESSION WITHOUT BEHAVIORAL DISTURBANCE (HCC): ICD-10-CM

## 2020-07-06 DIAGNOSIS — F03.90 PRESENILE DEMENTIA WITH DEPRESSION WITHOUT BEHAVIORAL DISTURBANCE (HCC): ICD-10-CM

## 2020-07-06 DIAGNOSIS — F45.8 ANXIETY HYPERVENTILATION: Primary | ICD-10-CM

## 2020-07-06 LAB
ALBUMIN SERPL BCP-MCNC: 3.6 G/DL (ref 3.5–5)
ALP SERPL-CCNC: 61 U/L (ref 46–116)
ALT SERPL W P-5'-P-CCNC: 37 U/L (ref 12–78)
ANION GAP SERPL CALCULATED.3IONS-SCNC: 9 MMOL/L (ref 4–13)
AST SERPL W P-5'-P-CCNC: 30 U/L (ref 5–45)
BILIRUB SERPL-MCNC: 0.81 MG/DL (ref 0.2–1)
BUN SERPL-MCNC: 14 MG/DL (ref 5–25)
CALCIUM SERPL-MCNC: 9.4 MG/DL (ref 8.3–10.1)
CHLORIDE SERPL-SCNC: 105 MMOL/L (ref 100–108)
CHOLEST SERPL-MCNC: 216 MG/DL (ref 50–200)
CO2 SERPL-SCNC: 25 MMOL/L (ref 21–32)
CREAT SERPL-MCNC: 1.07 MG/DL (ref 0.6–1.3)
GFR SERPL CREATININE-BSD FRML MDRD: 80 ML/MIN/1.73SQ M
GLUCOSE P FAST SERPL-MCNC: 124 MG/DL (ref 65–99)
HDLC SERPL-MCNC: 48 MG/DL
LDLC SERPL CALC-MCNC: 125 MG/DL (ref 0–100)
NONHDLC SERPL-MCNC: 168 MG/DL
NT-PROBNP SERPL-MCNC: 734 PG/ML
POTASSIUM SERPL-SCNC: 3.7 MMOL/L (ref 3.5–5.3)
PROT SERPL-MCNC: 7.1 G/DL (ref 6.4–8.2)
SODIUM SERPL-SCNC: 139 MMOL/L (ref 136–145)
TRIGL SERPL-MCNC: 214 MG/DL
TSH SERPL DL<=0.05 MIU/L-ACNC: 4.21 UIU/ML (ref 0.36–3.74)

## 2020-07-06 PROCEDURE — 84443 ASSAY THYROID STIM HORMONE: CPT

## 2020-07-06 PROCEDURE — 36415 COLL VENOUS BLD VENIPUNCTURE: CPT

## 2020-07-06 PROCEDURE — 80053 COMPREHEN METABOLIC PANEL: CPT

## 2020-07-06 PROCEDURE — 80061 LIPID PANEL: CPT

## 2020-07-06 PROCEDURE — 83880 ASSAY OF NATRIURETIC PEPTIDE: CPT

## 2020-09-04 ENCOUNTER — APPOINTMENT (OUTPATIENT)
Dept: LAB | Facility: CLINIC | Age: 50
End: 2020-09-04
Payer: COMMERCIAL

## 2020-09-04 ENCOUNTER — TRANSCRIBE ORDERS (OUTPATIENT)
Dept: URGENT CARE | Facility: CLINIC | Age: 50
End: 2020-09-04

## 2020-09-04 DIAGNOSIS — E03.9 HYPOTHYROIDISM, UNSPECIFIED TYPE: ICD-10-CM

## 2020-09-04 DIAGNOSIS — R73.9 BLOOD GLUCOSE ELEVATED: ICD-10-CM

## 2020-09-04 DIAGNOSIS — R73.9 BLOOD GLUCOSE ELEVATED: Primary | ICD-10-CM

## 2020-09-04 LAB
EST. AVERAGE GLUCOSE BLD GHB EST-MCNC: 120 MG/DL
HBA1C MFR BLD: 5.8 %
TSH SERPL DL<=0.05 MIU/L-ACNC: 3.79 UIU/ML (ref 0.36–3.74)

## 2020-09-04 PROCEDURE — 36415 COLL VENOUS BLD VENIPUNCTURE: CPT

## 2020-09-04 PROCEDURE — 84443 ASSAY THYROID STIM HORMONE: CPT

## 2020-09-04 PROCEDURE — 83036 HEMOGLOBIN GLYCOSYLATED A1C: CPT

## 2020-09-30 ENCOUNTER — APPOINTMENT (OUTPATIENT)
Dept: LAB | Facility: CLINIC | Age: 50
DRG: 194 | End: 2020-09-30
Payer: COMMERCIAL

## 2020-09-30 ENCOUNTER — TELEPHONE (OUTPATIENT)
Dept: FAMILY MEDICINE CLINIC | Facility: CLINIC | Age: 50
End: 2020-09-30

## 2020-09-30 ENCOUNTER — APPOINTMENT (OUTPATIENT)
Dept: RADIOLOGY | Facility: CLINIC | Age: 50
DRG: 194 | End: 2020-09-30
Payer: COMMERCIAL

## 2020-09-30 ENCOUNTER — OFFICE VISIT (OUTPATIENT)
Dept: FAMILY MEDICINE CLINIC | Facility: CLINIC | Age: 50
End: 2020-09-30
Payer: COMMERCIAL

## 2020-09-30 VITALS
SYSTOLIC BLOOD PRESSURE: 132 MMHG | OXYGEN SATURATION: 98 % | WEIGHT: 279 LBS | BODY MASS INDEX: 36.98 KG/M2 | TEMPERATURE: 98.3 F | RESPIRATION RATE: 18 BRPM | DIASTOLIC BLOOD PRESSURE: 84 MMHG | HEIGHT: 73 IN | HEART RATE: 89 BPM

## 2020-09-30 DIAGNOSIS — I50.22 CHF (CONGESTIVE HEART FAILURE), NYHA CLASS III, CHRONIC, SYSTOLIC (HCC): ICD-10-CM

## 2020-09-30 DIAGNOSIS — J44.1 COPD EXACERBATION (HCC): Primary | ICD-10-CM

## 2020-09-30 DIAGNOSIS — I70.235 ATHEROSCLEROSIS OF NATIVE ARTERIES OF RIGHT LEG WITH ULCERATION OF OTHER PART OF FOOT (HCC): ICD-10-CM

## 2020-09-30 DIAGNOSIS — R06.02 SHORTNESS OF BREATH: ICD-10-CM

## 2020-09-30 DIAGNOSIS — J44.9 COPD WITH ASTHMA (HCC): ICD-10-CM

## 2020-09-30 DIAGNOSIS — Z76.89 ENCOUNTER TO ESTABLISH CARE: ICD-10-CM

## 2020-09-30 DIAGNOSIS — F10.10 ALCOHOL ABUSE: ICD-10-CM

## 2020-09-30 DIAGNOSIS — F41.8 DEPRESSION WITH ANXIETY: Primary | ICD-10-CM

## 2020-09-30 DIAGNOSIS — I42.0 DILATED CARDIOMYOPATHY (HCC): ICD-10-CM

## 2020-09-30 PROBLEM — I44.7 LBBB (LEFT BUNDLE BRANCH BLOCK): Status: ACTIVE | Noted: 2017-08-09

## 2020-09-30 LAB
ALBUMIN SERPL BCP-MCNC: 3.7 G/DL (ref 3.5–5)
ALP SERPL-CCNC: 61 U/L (ref 46–116)
ALT SERPL W P-5'-P-CCNC: 63 U/L (ref 12–78)
ANION GAP SERPL CALCULATED.3IONS-SCNC: 4 MMOL/L (ref 4–13)
AST SERPL W P-5'-P-CCNC: 39 U/L (ref 5–45)
BASOPHILS # BLD AUTO: 0.07 THOUSANDS/ΜL (ref 0–0.1)
BASOPHILS NFR BLD AUTO: 1 % (ref 0–1)
BILIRUB SERPL-MCNC: 0.68 MG/DL (ref 0.2–1)
BUN SERPL-MCNC: 12 MG/DL (ref 5–25)
CALCIUM SERPL-MCNC: 9.1 MG/DL (ref 8.3–10.1)
CHLORIDE SERPL-SCNC: 108 MMOL/L (ref 100–108)
CO2 SERPL-SCNC: 28 MMOL/L (ref 21–32)
CREAT SERPL-MCNC: 1.15 MG/DL (ref 0.6–1.3)
EOSINOPHIL # BLD AUTO: 0.26 THOUSAND/ΜL (ref 0–0.61)
EOSINOPHIL NFR BLD AUTO: 2 % (ref 0–6)
ERYTHROCYTE [DISTWIDTH] IN BLOOD BY AUTOMATED COUNT: 13.3 % (ref 11.6–15.1)
GFR SERPL CREATININE-BSD FRML MDRD: 73 ML/MIN/1.73SQ M
GLUCOSE SERPL-MCNC: 108 MG/DL (ref 65–140)
HCT VFR BLD AUTO: 48.8 % (ref 36.5–49.3)
HGB BLD-MCNC: 17.1 G/DL (ref 12–17)
IMM GRANULOCYTES # BLD AUTO: 0.03 THOUSAND/UL (ref 0–0.2)
IMM GRANULOCYTES NFR BLD AUTO: 0 % (ref 0–2)
LYMPHOCYTES # BLD AUTO: 2.38 THOUSANDS/ΜL (ref 0.6–4.47)
LYMPHOCYTES NFR BLD AUTO: 22 % (ref 14–44)
MCH RBC QN AUTO: 33.5 PG (ref 26.8–34.3)
MCHC RBC AUTO-ENTMCNC: 35 G/DL (ref 31.4–37.4)
MCV RBC AUTO: 96 FL (ref 82–98)
MONOCYTES # BLD AUTO: 0.94 THOUSAND/ΜL (ref 0.17–1.22)
MONOCYTES NFR BLD AUTO: 9 % (ref 4–12)
NEUTROPHILS # BLD AUTO: 7.26 THOUSANDS/ΜL (ref 1.85–7.62)
NEUTS SEG NFR BLD AUTO: 66 % (ref 43–75)
NRBC BLD AUTO-RTO: 0 /100 WBCS
NT-PROBNP SERPL-MCNC: 2075 PG/ML
PLATELET # BLD AUTO: 251 THOUSANDS/UL (ref 149–390)
PMV BLD AUTO: 10.1 FL (ref 8.9–12.7)
POTASSIUM SERPL-SCNC: 4.4 MMOL/L (ref 3.5–5.3)
PROT SERPL-MCNC: 6.9 G/DL (ref 6.4–8.2)
RBC # BLD AUTO: 5.1 MILLION/UL (ref 3.88–5.62)
SODIUM SERPL-SCNC: 140 MMOL/L (ref 136–145)
WBC # BLD AUTO: 10.94 THOUSAND/UL (ref 4.31–10.16)

## 2020-09-30 PROCEDURE — 36415 COLL VENOUS BLD VENIPUNCTURE: CPT

## 2020-09-30 PROCEDURE — 71046 X-RAY EXAM CHEST 2 VIEWS: CPT

## 2020-09-30 PROCEDURE — 83880 ASSAY OF NATRIURETIC PEPTIDE: CPT

## 2020-09-30 PROCEDURE — 99205 OFFICE O/P NEW HI 60 MIN: CPT | Performed by: FAMILY MEDICINE

## 2020-09-30 PROCEDURE — 80053 COMPREHEN METABOLIC PANEL: CPT

## 2020-09-30 PROCEDURE — 85025 COMPLETE CBC W/AUTO DIFF WBC: CPT

## 2020-09-30 RX ORDER — PREDNISONE 20 MG/1
40 TABLET ORAL DAILY
Qty: 10 TABLET | Refills: 0 | Status: SHIPPED | OUTPATIENT
Start: 2020-09-30 | End: 2020-10-03 | Stop reason: HOSPADM

## 2020-09-30 RX ORDER — NITROGLYCERIN 0.4 MG/1
0.4 TABLET SUBLINGUAL
Qty: 10 TABLET | Refills: 0 | Status: SHIPPED | OUTPATIENT
Start: 2020-09-30

## 2020-09-30 RX ORDER — LEVOTHYROXINE SODIUM 0.03 MG/1
25 TABLET ORAL DAILY
COMMUNITY
End: 2021-02-05 | Stop reason: SDUPTHER

## 2020-09-30 RX ORDER — ALBUTEROL SULFATE 90 UG/1
2 AEROSOL, METERED RESPIRATORY (INHALATION) EVERY 6 HOURS PRN
Qty: 1 INHALER | Refills: 5 | Status: SHIPPED | OUTPATIENT
Start: 2020-09-30

## 2020-09-30 RX ORDER — CLONAZEPAM 1 MG/1
1 TABLET ORAL 2 TIMES DAILY
Qty: 60 TABLET | Refills: 0 | Status: SHIPPED | OUTPATIENT
Start: 2020-09-30 | End: 2021-03-04 | Stop reason: SDUPTHER

## 2020-09-30 NOTE — TELEPHONE ENCOUNTER
Chest XR reviewed and normal- patient contacted and discussed would recommend trial of prednisone burst to treat likely asthma/COPD exacerbation  Labs pending  Advised if symptoms do not improve with prednisone or if anything worsens, go to ED immediately  Discussed the importance of taking Lasix as prescribed as well  All questions answered  Please schedule patient for an office visit with me early next week- thanks!

## 2020-09-30 NOTE — PROGRESS NOTES
Santiago Cadet 1970 male MRN: [id number]  [facility] [place]    Visit to Establish Care: Family Medicine    Assessment/Plan     Depression with anxiety  - Klonopin refilled, terms of prescribing controlled substances discussed and PDMP reviewed   - Follow-up further next visit     Shortness of breath  - Worsening shortness of breath progressively over months to past couple weeks, unclear etiology given concurrent asthma/COPD and CHF/dilated cardiomyopathy, discussed differential decompensated CHF versus acute COPD/asthma exacerbation versus MI/ischemic heart disease  - Ambulatory pulse ox testing stable with no desats noted, pulse ox ranging 94-98%, very short of breath with ambulation around office   - Patient declining ED evaluation, risks of this decision discussed in detail including MI given cardiac history, strict ED precautions discussed   - Advised to restart Lasix dosing as per cardiology and monitor weight   - STAT chest XR and blood work at urgent care today, will call with results   - If chest XR shows evidence of pulmonary edema, will hold off treating asthma/COPD component, however if hyperinflated and absent signs of decompensated CHF, will trial prednisone course   - Close follow-up, will obtain records from prior PCP and continue discussion of chronic medical conditions next visit      Jonda Phoenix was seen today for establish care, medication refill and colonoscopy  Diagnoses and all orders for this visit:    Depression with anxiety  -     clonazePAM (KlonoPIN) 1 mg tablet; Take 1 tablet (1 mg total) by mouth 2 (two) times a day    Shortness of breath  -     XR chest pa & lateral; Future  -     Comprehensive metabolic panel; Future  -     CBC and differential; Future  -     NT-BNP PRO; Future    COPD with [facility] ([facility] )  -     albuterol (PROVENTIL HFA,VENTOLIN HFA) 90 mcg/act inhaler;  Inhale 2 puffs every 6 (six) hours as needed for wheezing or shortness of breath  -     Ambulatory referral to Pulmonology; Future    Atherosclerosis of native arteries of right leg with ulceration of other part of foot (HCC)  -     nitroglycerin (NITROSTAT) 0 4 mg SL tablet; Place 1 tablet (0 4 mg total) under the tongue every 5 (five) minutes as needed for chest pain    Encounter to establish care    Alcohol abuse    CHF (congestive heart failure), NYHA class III, chronic, systolic (HCC)    Dilated cardiomyopathy ([facility] )        In addition to the above, the patient was counseled on general preventative health care subjects, including but not limited to:  - Nutrition, healthy weight, aerobic and weight-bearing exercise  - Mental health, social support, and self care  - Advised of the importance of dental hygiene and routine dental visits   - Patient made aware of  services at the office  SUBJECTIVE    HPI:  Eh Manuel is a 44 y o  male with past medical history significant for CHF with dilated cardiomyopathy with AICD in place, hypertension, LBBB, PAD and history of alcohol abuse and tobacco use who presented to establish care with this family medicine practice  Prior PCP was not refilling medications, was following with [facility], and left prior PCP yesterday due to this  Here to establish care and for medication refills  He has been without Klonopin for 1 week  Due to being out of medication, crying fits and agitated, snapping at people, not his normal behavior  Mind racing, can't focus on anything, losing his normal routines  He also has stopped taking medications regularly due to this, over past 2 weeks has taken medications maybe twice        PHQ-9 Depression Screening    PHQ-9:    Frequency of the following problems over the past two weeks:       Little interest or pleasure in doing things:  1 - several days  Feeling down, depressed, or hopeless:  3 - nearly every day  Trouble falling or staying asleep, or sleeping too much:  2 - more than half the days  Feeling tired or having little energy:  2 - more than half the days  Poor appetite or overeating:  3 - nearly every day  Feeling bad about yourself - or that you are a failure or have let yourself or your family down:  1 - several days  Trouble concentrating on things, such as reading the newspaper or watching television:  3 - nearly every day  Moving or speaking so slowly that other people could have noticed  Or the opposite - being so fidgety or restless that you have been moving around a lot more than usual:  0 - not at all  Thoughts that you would be better off dead, or of hurting yourself in some way:  0 - not at all  PHQ-2 Score:  4  PHQ-9 Score:  15         Shortness of breath- Using Spiriva 5-6 times a day recently, has no rescue inhaler, history of COPD/asthma per patient with prior PFTs and following with pulmonology, lost to follow-up  He has history of prior bronchoscopy, has productive cough with thick mucous for months  His shortness of breath has been worsening over past months, more so over past weeks, and feels a chest tightness with this  Temporarily relieved with Spiriva, feels the need to use this often  He over skips his furosemide dose, weight 267 lb 12/2019 and weight today 279 lb  He's drinking 12 drinks daily now, worse with worsening mood  He's not willing to go to the ED today despite discussion of risks, but is amendable to having a STAT chest XR here before he leaves  Review of Systems   Constitutional: Positive for fatigue  Negative for activity change, appetite change, chills and fever  Respiratory: Positive for cough, chest tightness and shortness of breath  Cardiovascular: Positive for leg swelling  Negative for chest pain  Gastrointestinal: Positive for abdominal distention  Negative for abdominal pain, nausea and vomiting  Neurological: Negative for dizziness, light-headedness and headaches     Psychiatric/Behavioral: Positive for agitation, decreased concentration and sleep disturbance  Negative for suicidal ideas  The patient is nervous/anxious          Historical Information   Past Medical History:   Diagnosis Date    Cardiac disease     [facility]) [facility]     [facility]     Hypertension      Past Surgical History:   Procedure Laterality Date    BRONCHOSCOPY  03/10/2017    CARDIAC PACEMAKER PLACEMENT  10/04/2017     Family History   Problem Relation Age of Onset    No Known Problems Family     Diabetes Mother      Social History     Socioeconomic History    Marital status: Single     Spouse name: Not on file    Number of children: Not on file    Years of education: Not on file    Highest education level: Not on file   Occupational History    Not on file   Social Needs    Financial resource strain: Not on file    Food insecurity     Worry: Not on file     Inability: Not on file    Transportation needs     Medical: Not on file     Non-medical: Not on file   Tobacco Use    Smoking status: Current Every Day Smoker     Packs/day: 1 50     Years: 40 00     Pack years: 60 00     Types: Cigarettes    Smokeless tobacco: Former User   Substance and Sexual Activity    Alcohol use: Yes     Comment: socially    Drug use: No    Sexual activity: Not on file   Lifestyle    Physical activity     Days per week: Not on file     Minutes per session: Not on file    Stress: Not on file   Relationships    Social connections     Talks on phone: Not on file     Gets together: Not on file     Attends [employer] service: Not on file     Active member of club or organization: Not on file     Attends meetings of clubs or organizations: Not on file     Relationship status: Not on file    Intimate partner violence     Fear of current or ex partner: Not on file     Emotionally abused: Not on file     Physically abused: Not on file     Forced sexual activity: Not on file   Other Topics Concern    Not on file   Social History Narrative    Not on file Medications:      Current Outpatient Medications:     aspirin 81 mg chewable tablet, Chew 1 tablet (81 mg total) daily, Disp: 90 tablet, Rfl: 3    carvedilol (COREG) 12 5 mg tablet, Take 1 tablet (12 5 mg total) by mouth 2 (two) times a day with meals, Disp: 60 tablet, Rfl: 3    clonazePAM (KlonoPIN) 1 mg tablet, Take 1 tablet (1 mg total) by mouth 2 (two) times a day, Disp: 60 tablet, Rfl: 0    furosemide (LASIX) 40 mg tablet, Take 40 mg by mouth 2 (two) times a day  , Disp: , Rfl:     KLOR-CON M20 20 MEQ tablet, TAKE 1 TABLET BY MOUTH EVERY DAY, Disp: 30 tablet, Rfl: 3    lamoTRIgine (LaMICtal) 100 mg tablet, Take 100 mg by mouth 2 (two) times a day, Disp: , Rfl: 0    levothyroxine 25 mcg tablet, Take 25 mcg by mouth daily, Disp: , Rfl:     nitroglycerin (NITROSTAT) 0 4 mg SL tablet, Place 1 tablet (0 4 mg total) under the tongue every 5 (five) minutes as needed for chest pain, Disp: 10 tablet, Rfl: 0    sacubitril-valsartan (Entresto) 24-26 MG TABS, Take 1 tablet by mouth 2 (two) times a day, Disp: 180 tablet, Rfl: 3    SPIRIVA RESPIMAT 2 5 MCG/ACT AERS inhaler, , Disp: , Rfl:     albuterol (PROVENTIL HFA,VENTOLIN HFA) 90 mcg/act inhaler, Inhale 2 puffs every 6 (six) hours as needed for wheezing or shortness of breath, Disp: 1 Inhaler, Rfl: 5      Physical Exam:    Physical Exam  Vitals signs and nursing note reviewed  Constitutional:       General: He is not in acute distress  Appearance: Normal appearance  He is not ill-appearing  HENT:      Head: Normocephalic and atraumatic  Cardiovascular:      Rate and Rhythm: Normal rate and regular rhythm  Heart sounds: Normal heart sounds  No murmur  Pulmonary:      Breath sounds: No wheezing or rales        Comments: Diminished breath sounds at bases, tachypneic immediately following walk test, otherwise able to speak in full sentences, no rhonchi or wheezing appreciated   Abdominal:      General: Bowel sounds are normal  There is distension  Palpations: Abdomen is soft  Tenderness: There is no guarding or rebound  Comments: Abdominal distension, non-tender   Musculoskeletal:      Right lower leg: Edema present  Left lower leg: Edema present  Comments: 1+ pitting edema mid-tibial region bilaterally   Skin:     General: Skin is warm  Neurological:      Mental Status: He is alert and oriented to person, place, and time     Psychiatric:         Mood and Affect: Mood normal          Behavior: Behavior normal             Future Appointments   Date Time Provider Carito Galicia   10/2/2020  2:20 PM SHERRY Velasco CARD BE [facility]   12/7/2020  6:15 AM DEVICE REMOTE [place] CARD BE [facility]   3/9/2021  6:00 AM DEVICE REMOTE [place] CARD BE Abigail , [facility]

## 2020-09-30 NOTE — ASSESSMENT & PLAN NOTE
- Worsening shortness of breath progressively over months to past couple weeks, unclear etiology given concurrent asthma/COPD and CHF/dilated cardiomyopathy, discussed differential decompensated CHF versus acute COPD/asthma exacerbation versus MI/ischemic heart disease  - Ambulatory pulse ox testing stable with no desats noted, pulse ox ranging 94-98%, very short of breath with ambulation around office   - Patient declining ED evaluation, risks of this decision discussed in detail including MI given cardiac history, strict ED precautions discussed   - Advised to restart Lasix dosing as per cardiology and monitor weight   - STAT chest XR and blood work at urgent care today, will call with results   - If chest XR shows evidence of pulmonary edema, will hold off treating asthma/COPD component, however if hyperinflated and absent signs of decompensated CHF, will trial prednisone course   - Close follow-up, will obtain records from prior PCP and continue discussion of chronic medical conditions next visit

## 2020-10-01 ENCOUNTER — APPOINTMENT (EMERGENCY)
Dept: RADIOLOGY | Facility: HOSPITAL | Age: 50
DRG: 194 | End: 2020-10-01
Payer: COMMERCIAL

## 2020-10-01 ENCOUNTER — HOSPITAL ENCOUNTER (INPATIENT)
Facility: HOSPITAL | Age: 50
LOS: 2 days | Discharge: HOME | DRG: 194 | End: 2020-10-03
Attending: EMERGENCY MEDICINE | Admitting: INTERNAL MEDICINE
Payer: COMMERCIAL

## 2020-10-01 DIAGNOSIS — I50.9 CHF (CONGESTIVE HEART FAILURE) (HCC): Primary | ICD-10-CM

## 2020-10-01 DIAGNOSIS — F41.8 DEPRESSION WITH ANXIETY: ICD-10-CM

## 2020-10-01 DIAGNOSIS — I50.23 ACUTE ON CHRONIC SYSTOLIC (CONGESTIVE) HEART FAILURE (HCC): ICD-10-CM

## 2020-10-01 PROCEDURE — 90682 RIV4 VACC RECOMBINANT DNA IM: CPT | Performed by: INTERNAL MEDICINE

## 2020-10-01 PROCEDURE — 90471 IMMUNIZATION ADMIN: CPT | Performed by: INTERNAL MEDICINE

## 2020-10-01 PROCEDURE — 94664 DEMO&/EVAL PT USE INHALER: CPT

## 2020-10-01 PROCEDURE — 99285 EMERGENCY DEPT VISIT HI MDM: CPT | Performed by: PHYSICIAN ASSISTANT

## 2020-10-01 PROCEDURE — 94760 N-INVAS EAR/PLS OXIMETRY 1: CPT

## 2020-10-01 PROCEDURE — 99223 1ST HOSP IP/OBS HIGH 75: CPT | Performed by: INTERNAL MEDICINE

## 2020-10-01 PROCEDURE — 93005 ELECTROCARDIOGRAM TRACING: CPT

## 2020-10-01 PROCEDURE — 71045 X-RAY EXAM CHEST 1 VIEW: CPT

## 2020-10-01 PROCEDURE — 99285 EMERGENCY DEPT VISIT HI MDM: CPT

## 2020-10-01 PROCEDURE — 96374 THER/PROPH/DIAG INJ IV PUSH: CPT

## 2020-10-01 RX ORDER — ASPIRIN 81 MG/1
81 TABLET, CHEWABLE ORAL DAILY
Status: DISCONTINUED | OUTPATIENT
Start: 2020-10-02 | End: 2020-10-03 | Stop reason: HOSPADM

## 2020-10-01 RX ORDER — NICOTINE 21 MG/24HR
1 PATCH, TRANSDERMAL 24 HOURS TRANSDERMAL DAILY
Status: DISCONTINUED | OUTPATIENT
Start: 2020-10-02 | End: 2020-10-03 | Stop reason: HOSPADM

## 2020-10-01 RX ORDER — QUETIAPINE FUMARATE 50 MG/1
50 TABLET, FILM COATED ORAL
Status: DISCONTINUED | OUTPATIENT
Start: 2020-10-01 | End: 2020-10-03 | Stop reason: HOSPADM

## 2020-10-01 RX ORDER — FUROSEMIDE 10 MG/ML
40 INJECTION INTRAMUSCULAR; INTRAVENOUS 2 TIMES DAILY
Status: DISCONTINUED | OUTPATIENT
Start: 2020-10-01 | End: 2020-10-02

## 2020-10-01 RX ORDER — ACETAMINOPHEN 325 MG/1
650 TABLET ORAL EVERY 6 HOURS PRN
Status: DISCONTINUED | OUTPATIENT
Start: 2020-10-01 | End: 2020-10-03 | Stop reason: HOSPADM

## 2020-10-01 RX ORDER — CARVEDILOL 12.5 MG/1
12.5 TABLET ORAL 2 TIMES DAILY WITH MEALS
Status: DISCONTINUED | OUTPATIENT
Start: 2020-10-01 | End: 2020-10-03 | Stop reason: HOSPADM

## 2020-10-01 RX ORDER — POTASSIUM CHLORIDE 20 MEQ/1
20 TABLET, EXTENDED RELEASE ORAL DAILY
Status: DISCONTINUED | OUTPATIENT
Start: 2020-10-02 | End: 2020-10-03 | Stop reason: HOSPADM

## 2020-10-01 RX ORDER — LEVOTHYROXINE SODIUM 0.03 MG/1
25 TABLET ORAL DAILY
Status: DISCONTINUED | OUTPATIENT
Start: 2020-10-02 | End: 2020-10-03 | Stop reason: HOSPADM

## 2020-10-01 RX ORDER — LEVALBUTEROL 1.25 MG/.5ML
1.25 SOLUTION, CONCENTRATE RESPIRATORY (INHALATION)
Status: DISCONTINUED | OUTPATIENT
Start: 2020-10-01 | End: 2020-10-01

## 2020-10-01 RX ORDER — FUROSEMIDE 10 MG/ML
40 INJECTION INTRAMUSCULAR; INTRAVENOUS ONCE
Status: COMPLETED | OUTPATIENT
Start: 2020-10-01 | End: 2020-10-01

## 2020-10-01 RX ORDER — LAMOTRIGINE 100 MG/1
100 TABLET ORAL 2 TIMES DAILY
Status: DISCONTINUED | OUTPATIENT
Start: 2020-10-01 | End: 2020-10-03 | Stop reason: HOSPADM

## 2020-10-01 RX ORDER — CLONAZEPAM 1 MG/1
1 TABLET ORAL 2 TIMES DAILY
Status: DISCONTINUED | OUTPATIENT
Start: 2020-10-01 | End: 2020-10-03 | Stop reason: HOSPADM

## 2020-10-01 RX ORDER — ALBUTEROL SULFATE 90 UG/1
2 AEROSOL, METERED RESPIRATORY (INHALATION) EVERY 4 HOURS PRN
Status: DISCONTINUED | OUTPATIENT
Start: 2020-10-01 | End: 2020-10-03 | Stop reason: HOSPADM

## 2020-10-01 RX ORDER — ONDANSETRON 2 MG/ML
4 INJECTION INTRAMUSCULAR; INTRAVENOUS EVERY 4 HOURS PRN
Status: DISCONTINUED | OUTPATIENT
Start: 2020-10-01 | End: 2020-10-03 | Stop reason: HOSPADM

## 2020-10-01 RX ADMIN — INFLUENZA A VIRUS A/HAWAII/70/2019 (H1N1) RECOMBINANT HEMAGGLUTININ ANTIGEN, INFLUENZA A VIRUS A/MINNESOTA/41/2019 (H3N2) RECOMBINANT HEMAGGLUTININ ANTIGEN, INFLUENZA B VIRUS B/WASHINGTON/02/2019 RECOMBINANT HEMAGGLUTININ ANTIGEN, AND INFLUENZA B VIRUS B/PHUKET/3073/2013 RECOMBINANT HEMAGGLUTININ ANTIGEN 0.5 ML: 45; 45; 45; 45 INJECTION INTRAMUSCULAR at 17:10

## 2020-10-01 RX ADMIN — FUROSEMIDE 40 MG: 10 INJECTION, SOLUTION INTRAMUSCULAR; INTRAVENOUS at 17:05

## 2020-10-01 RX ADMIN — QUETIAPINE 50 MG: 50 TABLET ORAL at 21:24

## 2020-10-01 RX ADMIN — FUROSEMIDE 40 MG: 10 INJECTION, SOLUTION INTRAMUSCULAR; INTRAVENOUS at 14:04

## 2020-10-01 RX ADMIN — ENOXAPARIN SODIUM 40 MG: 40 INJECTION SUBCUTANEOUS at 17:06

## 2020-10-01 RX ADMIN — CLONAZEPAM 1 MG: 1 TABLET ORAL at 21:24

## 2020-10-01 RX ADMIN — CARVEDILOL 12.5 MG: 12.5 TABLET, FILM COATED ORAL at 17:06

## 2020-10-01 RX ADMIN — LAMOTRIGINE 100 MG: 100 TABLET ORAL at 17:06

## 2020-10-01 RX ADMIN — SACUBITRIL AND VALSARTAN 1 TABLET: 24; 26 TABLET, FILM COATED ORAL at 17:05

## 2020-10-02 LAB
ALBUMIN SERPL BCP-MCNC: 3.8 G/DL (ref 3.5–5.7)
ALP SERPL-CCNC: 39 U/L (ref 40–150)
ALT SERPL W P-5'-P-CCNC: 33 U/L (ref 7–52)
ANION GAP SERPL CALCULATED.3IONS-SCNC: 7 MMOL/L (ref 4–13)
AST SERPL W P-5'-P-CCNC: 23 U/L (ref 13–39)
ATRIAL RATE: 89 BPM
BILIRUB SERPL-MCNC: 0.6 MG/DL (ref 0.2–1)
BUN SERPL-MCNC: 13 MG/DL (ref 7–25)
CALCIUM SERPL-MCNC: 9.1 MG/DL (ref 8.6–10.5)
CHLORIDE SERPL-SCNC: 102 MMOL/L (ref 98–107)
CO2 SERPL-SCNC: 29 MMOL/L (ref 21–31)
CREAT SERPL-MCNC: 1.08 MG/DL (ref 0.7–1.3)
ERYTHROCYTE [DISTWIDTH] IN BLOOD BY AUTOMATED COUNT: 14 % (ref 11.5–14.5)
GFR SERPL CREATININE-BSD FRML MDRD: 78 ML/MIN/1.73SQ M
GLUCOSE SERPL-MCNC: 100 MG/DL (ref 65–99)
HCT VFR BLD AUTO: 45.4 % (ref 42–47)
HGB BLD-MCNC: 16 G/DL (ref 14–18)
MCH RBC QN AUTO: 33.9 PG (ref 26–34)
MCHC RBC AUTO-ENTMCNC: 35.3 G/DL (ref 31–37)
MCV RBC AUTO: 96 FL (ref 81–99)
P AXIS: 69 DEGREES
PLATELET # BLD AUTO: 191 THOUSANDS/UL (ref 149–390)
PMV BLD AUTO: 8 FL (ref 8.6–11.7)
POTASSIUM SERPL-SCNC: 3.4 MMOL/L (ref 3.5–5.5)
PR INTERVAL: 190 MS
PROT SERPL-MCNC: 5.7 G/DL (ref 6.4–8.9)
QRS AXIS: 43 DEGREES
QRSD INTERVAL: 148 MS
QT INTERVAL: 460 MS
QTC INTERVAL: 559 MS
RBC # BLD AUTO: 4.71 MILLION/UL (ref 4.3–5.9)
SODIUM SERPL-SCNC: 138 MMOL/L (ref 134–143)
T WAVE AXIS: 241 DEGREES
VENTRICULAR RATE: 89 BPM
WBC # BLD AUTO: 8.6 THOUSAND/UL (ref 4.8–10.8)

## 2020-10-02 PROCEDURE — 99222 1ST HOSP IP/OBS MODERATE 55: CPT | Performed by: PHYSICIAN ASSISTANT

## 2020-10-02 PROCEDURE — 94760 N-INVAS EAR/PLS OXIMETRY 1: CPT

## 2020-10-02 PROCEDURE — 94664 DEMO&/EVAL PT USE INHALER: CPT

## 2020-10-02 PROCEDURE — 80053 COMPREHEN METABOLIC PANEL: CPT | Performed by: INTERNAL MEDICINE

## 2020-10-02 PROCEDURE — 93010 ELECTROCARDIOGRAM REPORT: CPT | Performed by: INTERNAL MEDICINE

## 2020-10-02 PROCEDURE — 85027 COMPLETE CBC AUTOMATED: CPT | Performed by: INTERNAL MEDICINE

## 2020-10-02 PROCEDURE — 99232 SBSQ HOSP IP/OBS MODERATE 35: CPT | Performed by: HOSPITALIST

## 2020-10-02 RX ORDER — FUROSEMIDE 10 MG/ML
80 INJECTION INTRAMUSCULAR; INTRAVENOUS 2 TIMES DAILY
Status: DISCONTINUED | OUTPATIENT
Start: 2020-10-02 | End: 2020-10-03

## 2020-10-02 RX ADMIN — CLONAZEPAM 1 MG: 1 TABLET ORAL at 21:46

## 2020-10-02 RX ADMIN — TIOTROPIUM BROMIDE 18 MCG: 18 CAPSULE ORAL; RESPIRATORY (INHALATION) at 09:32

## 2020-10-02 RX ADMIN — POTASSIUM CHLORIDE 20 MEQ: 1500 TABLET, EXTENDED RELEASE ORAL at 09:32

## 2020-10-02 RX ADMIN — QUETIAPINE 50 MG: 50 TABLET ORAL at 21:46

## 2020-10-02 RX ADMIN — LAMOTRIGINE 100 MG: 100 TABLET ORAL at 17:16

## 2020-10-02 RX ADMIN — FUROSEMIDE 40 MG: 10 INJECTION, SOLUTION INTRAMUSCULAR; INTRAVENOUS at 09:32

## 2020-10-02 RX ADMIN — CARVEDILOL 12.5 MG: 12.5 TABLET, FILM COATED ORAL at 09:33

## 2020-10-02 RX ADMIN — CARVEDILOL 12.5 MG: 12.5 TABLET, FILM COATED ORAL at 15:37

## 2020-10-02 RX ADMIN — LEVOTHYROXINE SODIUM 25 MCG: 25 TABLET ORAL at 05:55

## 2020-10-02 RX ADMIN — LAMOTRIGINE 100 MG: 100 TABLET ORAL at 09:33

## 2020-10-02 RX ADMIN — ASPIRIN 81 MG: 81 TABLET, CHEWABLE ORAL at 09:33

## 2020-10-02 RX ADMIN — ENOXAPARIN SODIUM 40 MG: 40 INJECTION SUBCUTANEOUS at 09:32

## 2020-10-02 RX ADMIN — CLONAZEPAM 1 MG: 1 TABLET ORAL at 09:32

## 2020-10-02 RX ADMIN — SACUBITRIL AND VALSARTAN 1 TABLET: 24; 26 TABLET, FILM COATED ORAL at 17:16

## 2020-10-02 RX ADMIN — SACUBITRIL AND VALSARTAN 1 TABLET: 24; 26 TABLET, FILM COATED ORAL at 09:32

## 2020-10-02 RX ADMIN — FUROSEMIDE 80 MG: 10 INJECTION, SOLUTION INTRAMUSCULAR; INTRAVENOUS at 17:16

## 2020-10-03 VITALS
HEIGHT: 73 IN | TEMPERATURE: 99 F | HEART RATE: 81 BPM | SYSTOLIC BLOOD PRESSURE: 105 MMHG | WEIGHT: 270.83 LBS | DIASTOLIC BLOOD PRESSURE: 64 MMHG | BODY MASS INDEX: 35.89 KG/M2 | RESPIRATION RATE: 18 BRPM | OXYGEN SATURATION: 93 %

## 2020-10-03 LAB
ANION GAP SERPL CALCULATED.3IONS-SCNC: 6 MMOL/L (ref 4–13)
BASOPHILS # BLD AUTO: 0 THOUSANDS/ΜL (ref 0–0.1)
BASOPHILS NFR BLD AUTO: 0 % (ref 0–2)
BUN SERPL-MCNC: 19 MG/DL (ref 7–25)
CALCIUM SERPL-MCNC: 9 MG/DL (ref 8.6–10.5)
CHLORIDE SERPL-SCNC: 104 MMOL/L (ref 98–107)
CO2 SERPL-SCNC: 29 MMOL/L (ref 21–31)
CREAT SERPL-MCNC: 1.12 MG/DL (ref 0.7–1.3)
EOSINOPHIL # BLD AUTO: 0.2 THOUSAND/ΜL (ref 0–0.61)
EOSINOPHIL NFR BLD AUTO: 4 % (ref 0–5)
ERYTHROCYTE [DISTWIDTH] IN BLOOD BY AUTOMATED COUNT: 13.7 % (ref 11.5–14.5)
GFR SERPL CREATININE-BSD FRML MDRD: 75 ML/MIN/1.73SQ M
GLUCOSE SERPL-MCNC: 108 MG/DL (ref 65–99)
HCT VFR BLD AUTO: 47 % (ref 42–47)
HGB BLD-MCNC: 16.5 G/DL (ref 14–18)
LYMPHOCYTES # BLD AUTO: 2 THOUSANDS/ΜL (ref 0.6–4.47)
LYMPHOCYTES NFR BLD AUTO: 32 % (ref 21–51)
MCH RBC QN AUTO: 34 PG (ref 26–34)
MCHC RBC AUTO-ENTMCNC: 35 G/DL (ref 31–37)
MCV RBC AUTO: 97 FL (ref 81–99)
MONOCYTES # BLD AUTO: 0.7 THOUSAND/ΜL (ref 0.17–1.22)
MONOCYTES NFR BLD AUTO: 11 % (ref 2–12)
NEUTROPHILS # BLD AUTO: 3.2 THOUSANDS/ΜL (ref 1.4–6.5)
NEUTS SEG NFR BLD AUTO: 53 % (ref 42–75)
PLATELET # BLD AUTO: 192 THOUSANDS/UL (ref 149–390)
PMV BLD AUTO: 8.1 FL (ref 8.6–11.7)
POTASSIUM SERPL-SCNC: 3.5 MMOL/L (ref 3.5–5.5)
RBC # BLD AUTO: 4.85 MILLION/UL (ref 4.3–5.9)
SODIUM SERPL-SCNC: 139 MMOL/L (ref 134–143)
WBC # BLD AUTO: 6.1 THOUSAND/UL (ref 4.8–10.8)

## 2020-10-03 PROCEDURE — 80048 BASIC METABOLIC PNL TOTAL CA: CPT | Performed by: HOSPITALIST

## 2020-10-03 PROCEDURE — 85025 COMPLETE CBC W/AUTO DIFF WBC: CPT | Performed by: HOSPITALIST

## 2020-10-03 PROCEDURE — 99239 HOSP IP/OBS DSCHRG MGMT >30: CPT | Performed by: HOSPITALIST

## 2020-10-03 RX ORDER — FUROSEMIDE 40 MG/1
80 TABLET ORAL
Status: DISCONTINUED | OUTPATIENT
Start: 2020-10-03 | End: 2020-10-03 | Stop reason: HOSPADM

## 2020-10-03 RX ORDER — FUROSEMIDE 80 MG
80 TABLET ORAL 2 TIMES DAILY
Qty: 60 TABLET | Refills: 0 | Status: SHIPPED | OUTPATIENT
Start: 2020-10-03 | End: 2020-11-19 | Stop reason: SDUPTHER

## 2020-10-03 RX ORDER — QUETIAPINE FUMARATE 50 MG/1
50 TABLET, FILM COATED ORAL
Qty: 30 TABLET | Refills: 0 | Status: SHIPPED | OUTPATIENT
Start: 2020-10-03 | End: 2020-11-06 | Stop reason: SDUPTHER

## 2020-10-03 RX ADMIN — TIOTROPIUM BROMIDE 18 MCG: 18 CAPSULE ORAL; RESPIRATORY (INHALATION) at 08:44

## 2020-10-03 RX ADMIN — POTASSIUM CHLORIDE 20 MEQ: 1500 TABLET, EXTENDED RELEASE ORAL at 08:47

## 2020-10-03 RX ADMIN — ENOXAPARIN SODIUM 40 MG: 40 INJECTION SUBCUTANEOUS at 08:46

## 2020-10-03 RX ADMIN — LAMOTRIGINE 100 MG: 100 TABLET ORAL at 08:46

## 2020-10-03 RX ADMIN — CLONAZEPAM 1 MG: 1 TABLET ORAL at 08:47

## 2020-10-03 RX ADMIN — SACUBITRIL AND VALSARTAN 1 TABLET: 24; 26 TABLET, FILM COATED ORAL at 08:47

## 2020-10-03 RX ADMIN — CARVEDILOL 12.5 MG: 12.5 TABLET, FILM COATED ORAL at 08:46

## 2020-10-03 RX ADMIN — ASPIRIN 81 MG: 81 TABLET, CHEWABLE ORAL at 08:46

## 2020-10-03 RX ADMIN — LEVOTHYROXINE SODIUM 25 MCG: 25 TABLET ORAL at 05:19

## 2020-10-03 RX ADMIN — FUROSEMIDE 80 MG: 10 INJECTION, SOLUTION INTRAMUSCULAR; INTRAVENOUS at 08:46

## 2020-10-05 ENCOUNTER — TRANSITIONAL CARE MANAGEMENT (OUTPATIENT)
Dept: FAMILY MEDICINE CLINIC | Facility: CLINIC | Age: 50
End: 2020-10-05

## 2020-10-05 ENCOUNTER — TELEPHONE (OUTPATIENT)
Dept: CARDIOLOGY CLINIC | Facility: CLINIC | Age: 50
End: 2020-10-05

## 2020-10-05 DIAGNOSIS — I50.22 CHRONIC SYSTOLIC CHF (CONGESTIVE HEART FAILURE), NYHA CLASS 3 (HCC): Primary | ICD-10-CM

## 2020-10-06 ENCOUNTER — OFFICE VISIT (OUTPATIENT)
Dept: FAMILY MEDICINE CLINIC | Facility: CLINIC | Age: 50
End: 2020-10-06
Payer: COMMERCIAL

## 2020-10-06 VITALS
HEIGHT: 73 IN | SYSTOLIC BLOOD PRESSURE: 120 MMHG | RESPIRATION RATE: 16 BRPM | TEMPERATURE: 97.7 F | HEART RATE: 97 BPM | DIASTOLIC BLOOD PRESSURE: 82 MMHG | OXYGEN SATURATION: 98 % | WEIGHT: 277 LBS | BODY MASS INDEX: 36.71 KG/M2

## 2020-10-06 DIAGNOSIS — I50.22 CHRONIC SYSTOLIC CONGESTIVE HEART FAILURE (HCC): Primary | ICD-10-CM

## 2020-10-06 DIAGNOSIS — F32.A ANXIETY AND DEPRESSION: ICD-10-CM

## 2020-10-06 DIAGNOSIS — F41.9 ANXIETY AND DEPRESSION: ICD-10-CM

## 2020-10-06 DIAGNOSIS — J44.9 CHRONIC OBSTRUCTIVE PULMONARY DISEASE, UNSPECIFIED COPD TYPE (HCC): ICD-10-CM

## 2020-10-06 DIAGNOSIS — I42.0 DILATED CARDIOMYOPATHY (HCC): ICD-10-CM

## 2020-10-06 PROBLEM — R06.02 SHORTNESS OF BREATH: Status: RESOLVED | Noted: 2020-09-30 | Resolved: 2020-10-06

## 2020-10-06 PROBLEM — F41.8 DEPRESSION WITH ANXIETY: Status: RESOLVED | Noted: 2020-09-30 | Resolved: 2020-10-06

## 2020-10-06 PROCEDURE — 1111F DSCHRG MED/CURRENT MED MERGE: CPT | Performed by: FAMILY MEDICINE

## 2020-10-06 PROCEDURE — 99495 TRANSJ CARE MGMT MOD F2F 14D: CPT | Performed by: FAMILY MEDICINE

## 2020-10-08 ENCOUNTER — APPOINTMENT (OUTPATIENT)
Dept: LAB | Facility: CLINIC | Age: 50
End: 2020-10-08
Payer: COMMERCIAL

## 2020-10-08 DIAGNOSIS — I50.22 CHRONIC SYSTOLIC CONGESTIVE HEART FAILURE (HCC): ICD-10-CM

## 2020-10-08 DIAGNOSIS — I50.22 CHRONIC SYSTOLIC CONGESTIVE HEART FAILURE (HCC): Primary | ICD-10-CM

## 2020-10-08 LAB
ANION GAP SERPL CALCULATED.3IONS-SCNC: 3 MMOL/L (ref 4–13)
BUN SERPL-MCNC: 23 MG/DL (ref 5–25)
CALCIUM SERPL-MCNC: 9.3 MG/DL (ref 8.3–10.1)
CHLORIDE SERPL-SCNC: 107 MMOL/L (ref 100–108)
CO2 SERPL-SCNC: 29 MMOL/L (ref 21–32)
CREAT SERPL-MCNC: 1.11 MG/DL (ref 0.6–1.3)
GFR SERPL CREATININE-BSD FRML MDRD: 76 ML/MIN/1.73SQ M
GLUCOSE P FAST SERPL-MCNC: 108 MG/DL (ref 65–99)
POTASSIUM SERPL-SCNC: 3.7 MMOL/L (ref 3.5–5.3)
SODIUM SERPL-SCNC: 139 MMOL/L (ref 136–145)

## 2020-10-08 PROCEDURE — 80048 BASIC METABOLIC PNL TOTAL CA: CPT

## 2020-10-08 PROCEDURE — 36415 COLL VENOUS BLD VENIPUNCTURE: CPT

## 2020-10-12 ENCOUNTER — OFFICE VISIT (OUTPATIENT)
Dept: CARDIOLOGY CLINIC | Facility: CLINIC | Age: 50
End: 2020-10-12
Payer: COMMERCIAL

## 2020-10-12 VITALS
SYSTOLIC BLOOD PRESSURE: 116 MMHG | DIASTOLIC BLOOD PRESSURE: 72 MMHG | TEMPERATURE: 97.1 F | HEIGHT: 73 IN | HEART RATE: 90 BPM | BODY MASS INDEX: 37.29 KG/M2 | WEIGHT: 281.4 LBS

## 2020-10-12 DIAGNOSIS — I42.8 NICM (NONISCHEMIC CARDIOMYOPATHY) (HCC): ICD-10-CM

## 2020-10-12 DIAGNOSIS — I50.42 CHRONIC COMBINED SYSTOLIC AND DIASTOLIC CONGESTIVE HEART FAILURE (HCC): Primary | ICD-10-CM

## 2020-10-12 DIAGNOSIS — Z12.11 ENCOUNTER FOR SCREENING COLONOSCOPY: ICD-10-CM

## 2020-10-12 DIAGNOSIS — I50.22 CHRONIC SYSTOLIC CHF (CONGESTIVE HEART FAILURE), NYHA CLASS 3 (HCC): ICD-10-CM

## 2020-10-12 DIAGNOSIS — I42.0 DILATED CARDIOMYOPATHY (HCC): ICD-10-CM

## 2020-10-12 DIAGNOSIS — I47.2 NSVT (NONSUSTAINED VENTRICULAR TACHYCARDIA) (HCC): ICD-10-CM

## 2020-10-12 PROCEDURE — 4004F PT TOBACCO SCREEN RCVD TLK: CPT | Performed by: INTERNAL MEDICINE

## 2020-10-12 PROCEDURE — 3078F DIAST BP <80 MM HG: CPT | Performed by: INTERNAL MEDICINE

## 2020-10-12 PROCEDURE — 99214 OFFICE O/P EST MOD 30 MIN: CPT | Performed by: INTERNAL MEDICINE

## 2020-10-12 RX ORDER — POTASSIUM CHLORIDE 20 MEQ/1
20 TABLET, EXTENDED RELEASE ORAL DAILY
Qty: 30 TABLET | Refills: 3 | Status: SHIPPED | OUTPATIENT
Start: 2020-10-12 | End: 2021-03-04 | Stop reason: SDUPTHER

## 2020-10-12 RX ORDER — METOPROLOL SUCCINATE 50 MG/1
50 TABLET, EXTENDED RELEASE ORAL 2 TIMES DAILY
Qty: 60 TABLET | Refills: 3 | Status: SHIPPED | OUTPATIENT
Start: 2020-10-12 | End: 2021-03-12 | Stop reason: SDUPTHER

## 2020-10-27 ENCOUNTER — OFFICE VISIT (OUTPATIENT)
Dept: PULMONOLOGY | Facility: CLINIC | Age: 50
End: 2020-10-27
Payer: COMMERCIAL

## 2020-10-27 VITALS
RESPIRATION RATE: 16 BRPM | WEIGHT: 277 LBS | BODY MASS INDEX: 36.55 KG/M2 | SYSTOLIC BLOOD PRESSURE: 138 MMHG | HEART RATE: 82 BPM | OXYGEN SATURATION: 96 % | DIASTOLIC BLOOD PRESSURE: 84 MMHG | TEMPERATURE: 97.5 F

## 2020-10-27 DIAGNOSIS — J44.9 COPD WITH ASTHMA (HCC): ICD-10-CM

## 2020-10-27 DIAGNOSIS — Z72.0 TOBACCO ABUSE: ICD-10-CM

## 2020-10-27 DIAGNOSIS — G47.33 OSA (OBSTRUCTIVE SLEEP APNEA): ICD-10-CM

## 2020-10-27 DIAGNOSIS — J44.9 CHRONIC OBSTRUCTIVE PULMONARY DISEASE, UNSPECIFIED COPD TYPE (HCC): Primary | ICD-10-CM

## 2020-10-27 DIAGNOSIS — I42.0 DILATED CARDIOMYOPATHY (HCC): ICD-10-CM

## 2020-10-27 DIAGNOSIS — Z23 NEED FOR PROPHYLACTIC VACCINATION AGAINST STREPTOCOCCUS PNEUMONIAE (PNEUMOCOCCUS): ICD-10-CM

## 2020-10-27 PROCEDURE — 90732 PPSV23 VACC 2 YRS+ SUBQ/IM: CPT

## 2020-10-27 PROCEDURE — 99245 OFF/OP CONSLTJ NEW/EST HI 55: CPT | Performed by: INTERNAL MEDICINE

## 2020-10-27 PROCEDURE — 94010 BREATHING CAPACITY TEST: CPT | Performed by: INTERNAL MEDICINE

## 2020-10-27 PROCEDURE — 90471 IMMUNIZATION ADMIN: CPT

## 2020-10-29 ENCOUNTER — PATIENT OUTREACH (OUTPATIENT)
Dept: OTHER | Facility: CLINIC | Age: 50
End: 2020-10-29

## 2020-10-29 ENCOUNTER — TELEPHONE (OUTPATIENT)
Dept: PULMONOLOGY | Facility: CLINIC | Age: 50
End: 2020-10-29

## 2020-10-30 ENCOUNTER — TELEPHONE (OUTPATIENT)
Dept: PULMONOLOGY | Facility: CLINIC | Age: 50
End: 2020-10-30

## 2020-11-06 ENCOUNTER — TELEPHONE (OUTPATIENT)
Dept: FAMILY MEDICINE CLINIC | Facility: CLINIC | Age: 50
End: 2020-11-06

## 2020-11-06 ENCOUNTER — OFFICE VISIT (OUTPATIENT)
Dept: FAMILY MEDICINE CLINIC | Facility: CLINIC | Age: 50
End: 2020-11-06
Payer: COMMERCIAL

## 2020-11-06 ENCOUNTER — APPOINTMENT (OUTPATIENT)
Dept: RADIOLOGY | Facility: CLINIC | Age: 50
End: 2020-11-06
Payer: COMMERCIAL

## 2020-11-06 VITALS
DIASTOLIC BLOOD PRESSURE: 72 MMHG | HEART RATE: 83 BPM | RESPIRATION RATE: 20 BRPM | OXYGEN SATURATION: 98 % | BODY MASS INDEX: 37.24 KG/M2 | SYSTOLIC BLOOD PRESSURE: 112 MMHG | TEMPERATURE: 97.2 F | WEIGHT: 281 LBS | HEIGHT: 73 IN

## 2020-11-06 DIAGNOSIS — Z12.11 SCREENING FOR COLON CANCER: ICD-10-CM

## 2020-11-06 DIAGNOSIS — J44.9 CHRONIC OBSTRUCTIVE PULMONARY DISEASE, UNSPECIFIED COPD TYPE (HCC): ICD-10-CM

## 2020-11-06 DIAGNOSIS — I50.42 CHRONIC COMBINED SYSTOLIC AND DIASTOLIC CONGESTIVE HEART FAILURE (HCC): Primary | ICD-10-CM

## 2020-11-06 DIAGNOSIS — G47.33 OSA (OBSTRUCTIVE SLEEP APNEA): ICD-10-CM

## 2020-11-06 DIAGNOSIS — I50.42 CHRONIC COMBINED SYSTOLIC AND DIASTOLIC CONGESTIVE HEART FAILURE (HCC): ICD-10-CM

## 2020-11-06 DIAGNOSIS — F41.8 DEPRESSION WITH ANXIETY: ICD-10-CM

## 2020-11-06 PROCEDURE — 3074F SYST BP LT 130 MM HG: CPT | Performed by: FAMILY MEDICINE

## 2020-11-06 PROCEDURE — 3078F DIAST BP <80 MM HG: CPT | Performed by: FAMILY MEDICINE

## 2020-11-06 PROCEDURE — 4004F PT TOBACCO SCREEN RCVD TLK: CPT | Performed by: FAMILY MEDICINE

## 2020-11-06 PROCEDURE — 3725F SCREEN DEPRESSION PERFORMED: CPT | Performed by: FAMILY MEDICINE

## 2020-11-06 PROCEDURE — 99213 OFFICE O/P EST LOW 20 MIN: CPT | Performed by: FAMILY MEDICINE

## 2020-11-06 PROCEDURE — 71046 X-RAY EXAM CHEST 2 VIEWS: CPT

## 2020-11-06 PROCEDURE — 3008F BODY MASS INDEX DOCD: CPT | Performed by: FAMILY MEDICINE

## 2020-11-06 RX ORDER — QUETIAPINE FUMARATE 50 MG/1
50 TABLET, FILM COATED ORAL
Qty: 30 TABLET | Refills: 5 | Status: SHIPPED | OUTPATIENT
Start: 2020-11-06 | End: 2021-05-11 | Stop reason: SDUPTHER

## 2020-11-09 ENCOUNTER — TELEPHONE (OUTPATIENT)
Dept: FAMILY MEDICINE CLINIC | Facility: CLINIC | Age: 50
End: 2020-11-09

## 2020-11-19 DIAGNOSIS — I50.23 ACUTE ON CHRONIC SYSTOLIC (CONGESTIVE) HEART FAILURE (HCC): ICD-10-CM

## 2020-11-21 RX ORDER — FUROSEMIDE 80 MG
80 TABLET ORAL 2 TIMES DAILY
Qty: 60 TABLET | Refills: 0 | Status: SHIPPED | OUTPATIENT
Start: 2020-11-21 | End: 2020-11-21

## 2020-11-21 RX ORDER — FUROSEMIDE 80 MG
80 TABLET ORAL 2 TIMES DAILY
Qty: 60 TABLET | Refills: 0 | Status: SHIPPED | OUTPATIENT
Start: 2020-11-21 | End: 2021-02-26 | Stop reason: SDUPTHER

## 2020-11-23 ENCOUNTER — HOSPITAL ENCOUNTER (OUTPATIENT)
Dept: NON INVASIVE DIAGNOSTICS | Facility: CLINIC | Age: 50
Discharge: HOME | End: 2020-11-23
Payer: COMMERCIAL

## 2020-11-23 DIAGNOSIS — I50.22 CHRONIC SYSTOLIC CHF (CONGESTIVE HEART FAILURE), NYHA CLASS 3 (HCC): ICD-10-CM

## 2020-11-23 PROCEDURE — 93306 TTE W/DOPPLER COMPLETE: CPT | Performed by: INTERNAL MEDICINE

## 2020-11-23 PROCEDURE — 93306 TTE W/DOPPLER COMPLETE: CPT

## 2020-11-27 ENCOUNTER — TELEPHONE (OUTPATIENT)
Dept: PULMONOLOGY | Facility: CLINIC | Age: 50
End: 2020-11-27

## 2020-12-18 DIAGNOSIS — F32.A ANXIETY AND DEPRESSION: Primary | ICD-10-CM

## 2020-12-18 DIAGNOSIS — F41.9 ANXIETY AND DEPRESSION: Primary | ICD-10-CM

## 2020-12-18 RX ORDER — LAMOTRIGINE 100 MG/1
100 TABLET ORAL 2 TIMES DAILY
Qty: 90 TABLET | Refills: 0 | Status: SHIPPED | OUTPATIENT
Start: 2020-12-18 | End: 2021-02-26 | Stop reason: SDUPTHER

## 2021-01-21 ENCOUNTER — OFFICE VISIT (OUTPATIENT)
Dept: PULMONOLOGY | Facility: CLINIC | Age: 51
End: 2021-01-21
Payer: COMMERCIAL

## 2021-01-21 VITALS
SYSTOLIC BLOOD PRESSURE: 124 MMHG | OXYGEN SATURATION: 97 % | HEART RATE: 82 BPM | RESPIRATION RATE: 16 BRPM | WEIGHT: 281 LBS | TEMPERATURE: 96.6 F | DIASTOLIC BLOOD PRESSURE: 80 MMHG | BODY MASS INDEX: 37.07 KG/M2

## 2021-01-21 DIAGNOSIS — G47.33 OSA (OBSTRUCTIVE SLEEP APNEA): ICD-10-CM

## 2021-01-21 DIAGNOSIS — J44.9 CHRONIC OBSTRUCTIVE PULMONARY DISEASE, UNSPECIFIED COPD TYPE (HCC): Primary | ICD-10-CM

## 2021-01-21 DIAGNOSIS — Z72.0 TOBACCO ABUSE: ICD-10-CM

## 2021-01-21 PROBLEM — I50.23 ACUTE ON CHRONIC SYSTOLIC (CONGESTIVE) HEART FAILURE (HCC): Status: RESOLVED | Noted: 2017-06-30 | Resolved: 2021-01-21

## 2021-01-21 PROCEDURE — 99214 OFFICE O/P EST MOD 30 MIN: CPT | Performed by: INTERNAL MEDICINE

## 2021-01-21 PROCEDURE — 4004F PT TOBACCO SCREEN RCVD TLK: CPT | Performed by: INTERNAL MEDICINE

## 2021-01-21 PROCEDURE — 99406 BEHAV CHNG SMOKING 3-10 MIN: CPT | Performed by: INTERNAL MEDICINE

## 2021-01-21 PROCEDURE — 3074F SYST BP LT 130 MM HG: CPT | Performed by: INTERNAL MEDICINE

## 2021-01-21 PROCEDURE — 3079F DIAST BP 80-89 MM HG: CPT | Performed by: INTERNAL MEDICINE

## 2021-01-21 NOTE — ASSESSMENT & PLAN NOTE
· Continue ProAir 3-4 times daily when needed  · Tobacco cessation encouraged  · At this time would not pursue additional agents    He is changing insurances in the next 2 months and will need to check formulary once this has been complete

## 2021-01-21 NOTE — ASSESSMENT & PLAN NOTE
· He no longer has a machine  · Previous records obtained and sleep apnea was mild  · Will consider repeat sleep study after insurance change

## 2021-01-21 NOTE — PROGRESS NOTES
Progress note - Pulmonary Medicine   Kathi Mcmanus 44 y o  male MRN: [id number]       Impression & Plan:     COPD (chronic obstructive pulmonary disease) ([facility] [address] )  · Continue ProAir 3-4 times daily when needed  · Tobacco cessation encouraged  · At this time would not pursue additional agents  He is changing insurances in the next 2 months and will need to check formulary once this has been complete    MARRY (obstructive sleep apnea)  · He no longer has a machine  · Previous records obtained and sleep apnea was mild  · Will consider repeat sleep study after insurance change    Tobacco abuse  · We discussed tobacco cessation  · He has been cutting back, now under a pack daily  · Did not tolerate Chantix and nicotine patch combination in the past  · I did give him samples, coupons, and a few lozenges to try to see if he can use intermittent nicotine replacement in place of cigarettes  · He is agreeable to trying this approach  · Tobacco cessation counseling time 5 minutes    He will see me back in 3 months  We will revisit his symptoms at that time and check formulary compatibility for medications  ______________________________________________________________________    HPI:    Kathi Mcmanus presents today for follow-up of COPD  He has been doing well recently  He has been cutting back on cigarette smoking and is now under a pack of cigarettes daily  He denies any new pulmonary symptoms  No wheezing  No chest tightness  No worsening cough or phlegm production  He is treated by Dr Jannet Gannon for chronic congestive heart failure  He has been doing well and has been compensated from this standpoint recently  He is did tell me that he may need to have his pacemaker generator change or possibly a lead revision    No abdominal pain or GERD symptoms  He is overweight  No lightheadedness or dizziness  No arthralgias or myalgias    He is medically disabled due to his heart condition    Current Medications:    Current Outpatient Medications:     albuterol (PROVENTIL HFA,VENTOLIN HFA) 90 mcg/act inhaler, Inhale 2 puffs every 6 (six) hours as needed for wheezing or shortness of breath, Disp: 1 Inhaler, Rfl: 5    aspirin 81 mg chewable tablet, Chew 1 tablet (81 mg total) daily, Disp: 90 tablet, Rfl: 3    carvedilol (COREG) 12 5 mg tablet, Take 1 tablet (12 5 mg total) by mouth 2 (two) times a day with meals, Disp: 60 tablet, Rfl: 3    clonazePAM (KlonoPIN) 1 mg tablet, Take 1 tablet (1 mg total) by mouth 2 (two) times a day, Disp: 60 tablet, Rfl: 0    lamoTRIgine (LaMICtal) 100 mg tablet, Take 1 tablet (100 mg total) by mouth 2 (two) times a day, Disp: 90 tablet, Rfl: 0    levothyroxine 25 mcg tablet, Take 25 mcg by mouth daily, Disp: , Rfl:     metoprolol succinate (TOPROL-XL) 50 mg 24 hr tablet, Take 1 tablet (50 mg total) by mouth 2 (two) times a day, Disp: 60 tablet, Rfl: 3    nitroglycerin (NITROSTAT) 0 4 mg SL tablet, Place 1 tablet (0 4 mg total) under the tongue every 5 (five) minutes as needed for chest pain, Disp: 10 tablet, Rfl: 0    potassium chloride (Klor-Con M20) 20 mEq tablet, Take 1 tablet (20 mEq total) by mouth daily, Disp: 30 tablet, Rfl: 3    QUEtiapine (SEROquel) 50 mg tablet, Take 1 tablet (50 mg total) by mouth daily at bedtime, Disp: 30 tablet, Rfl: 5    sacubitril-valsartan (Entresto) 24-26 MG TABS, Take 1 tablet by mouth 2 (two) times a day, Disp: 180 tablet, Rfl: 3    furosemide (LASIX) 80 mg tablet, Take 1 tablet (80 mg total) by mouth 2 (two) times a day, Disp: 60 tablet, Rfl: 0    Review of Systems:  Aside from what is mentioned in the HPI, the review of systems is otherwise negative    Past medical history, surgical history, and family history were reviewed and updated as appropriate    Social history updates:  Social History     Tobacco Use   Smoking Status Current Every Day Smoker    Packs/day: 1 50    Years: 40 00    Pack years: 60 00    Types: Cigarettes   Smokeless Tobacco Former User       PhysicalExamination:  Vitals:   /80   Pulse 82   Temp (!) 96 6 °F (35 9 °C)   Resp 16   Wt 127 kg (281 lb)   SpO2 97%   BMI 37 07 kg/m²   Gen:  Obese, comfortable on room air  HEENT: PERRL  Oropharynx is crowded with a low-lying palate  There is no erythema or exudate  Neck: Stout  I do not appreciate any JVD or lymphadenopathy  Trachea is midline  No thyromegaly  Chest: Breath sounds are distant but clear to auscultation  There are no wheezes, rales or rhonchi  Cardiac: Regular rate and rhythm  There are no murmurs, rubs or gallops  Abdomen: Obese  Soft and nontender  Extremities: No clubbing, cyanosis or edema  Neurologic: No focal deficits  Skin: No appreciable rashes  Diagnostic Data:  Labs: I personally reviewed the most recent laboratory data pertinent to today's visit    Lab Results   Component Value Date    WBC 6 10 10/03/2020    HGB 16 5 10/03/2020    HCT 47 0 10/03/2020    MCV 97 10/03/2020     10/03/2020     Lab Results   Component Value Date    SODIUM 139 10/08/2020    K 3 7 10/08/2020    CO2 29 10/08/2020     10/08/2020    BUN 23 10/08/2020    CREATININE 1 11 10/08/2020    CALCIUM 9 3 10/08/2020         Other studies:  Prior recent hospital records were reviewed    Pulmonary function test, sleep study, and office notes from Dr Bijan Borges were reviewed    Briseida Nair MD

## 2021-01-21 NOTE — ASSESSMENT & PLAN NOTE
· We discussed tobacco cessation  · He has been cutting back, now under a pack daily  · Did not tolerate Chantix and nicotine patch combination in the past  · I did give him samples, coupons, and a few lozenges to try to see if he can use intermittent nicotine replacement in place of cigarettes  · He is agreeable to trying this approach  · Tobacco cessation counseling time 5 minutes

## 2021-02-05 DIAGNOSIS — E03.9 HYPOTHYROIDISM, UNSPECIFIED TYPE: Primary | ICD-10-CM

## 2021-02-06 RX ORDER — LEVOTHYROXINE SODIUM 0.03 MG/1
25 TABLET ORAL DAILY
Qty: 90 TABLET | Refills: 1 | Status: SHIPPED | OUTPATIENT
Start: 2021-02-06 | End: 2021-03-24 | Stop reason: ALTCHOICE

## 2021-02-09 ENCOUNTER — OFFICE VISIT (OUTPATIENT)
Dept: FAMILY MEDICINE CLINIC | Facility: CLINIC | Age: 51
End: 2021-02-09
Payer: COMMERCIAL

## 2021-02-09 VITALS
HEIGHT: 73 IN | HEART RATE: 95 BPM | SYSTOLIC BLOOD PRESSURE: 136 MMHG | OXYGEN SATURATION: 98 % | DIASTOLIC BLOOD PRESSURE: 94 MMHG | TEMPERATURE: 97.3 F | BODY MASS INDEX: 39.07 KG/M2 | WEIGHT: 294.8 LBS | RESPIRATION RATE: 20 BRPM

## 2021-02-09 DIAGNOSIS — I73.9 PERIPHERAL ARTERIAL DISEASE (HCC): ICD-10-CM

## 2021-02-09 DIAGNOSIS — I42.0 DILATED CARDIOMYOPATHY (HCC): ICD-10-CM

## 2021-02-09 DIAGNOSIS — J44.9 CHRONIC OBSTRUCTIVE PULMONARY DISEASE, UNSPECIFIED COPD TYPE (HCC): ICD-10-CM

## 2021-02-09 DIAGNOSIS — R73.03 PRE-DIABETES: ICD-10-CM

## 2021-02-09 DIAGNOSIS — I10 ESSENTIAL HYPERTENSION: ICD-10-CM

## 2021-02-09 DIAGNOSIS — G47.33 OSA (OBSTRUCTIVE SLEEP APNEA): ICD-10-CM

## 2021-02-09 DIAGNOSIS — Z72.0 TOBACCO ABUSE: ICD-10-CM

## 2021-02-09 DIAGNOSIS — E03.9 HYPOTHYROIDISM, UNSPECIFIED TYPE: Primary | ICD-10-CM

## 2021-02-09 DIAGNOSIS — I50.42 CHRONIC COMBINED SYSTOLIC AND DIASTOLIC CONGESTIVE HEART FAILURE (HCC): ICD-10-CM

## 2021-02-09 DIAGNOSIS — F10.10 ALCOHOL ABUSE: ICD-10-CM

## 2021-02-09 PROCEDURE — 4004F PT TOBACCO SCREEN RCVD TLK: CPT | Performed by: FAMILY MEDICINE

## 2021-02-09 PROCEDURE — 99214 OFFICE O/P EST MOD 30 MIN: CPT | Performed by: FAMILY MEDICINE

## 2021-02-09 PROCEDURE — 3725F SCREEN DEPRESSION PERFORMED: CPT | Performed by: FAMILY MEDICINE

## 2021-02-11 ENCOUNTER — TELEPHONE (OUTPATIENT)
Dept: FAMILY MEDICINE CLINIC | Facility: CLINIC | Age: 51
End: 2021-02-11

## 2021-02-12 NOTE — TELEPHONE ENCOUNTER
Please let the patient know that after our visit this week I spoke to his cardiologist, he let me know the patient is overdue for device check  Please reach out to the patient and have him schedule an appointment with them  Thanks!

## 2021-02-26 DIAGNOSIS — I50.23 ACUTE ON CHRONIC SYSTOLIC (CONGESTIVE) HEART FAILURE (HCC): ICD-10-CM

## 2021-02-26 DIAGNOSIS — F32.A ANXIETY AND DEPRESSION: ICD-10-CM

## 2021-02-26 DIAGNOSIS — F41.9 ANXIETY AND DEPRESSION: ICD-10-CM

## 2021-02-26 RX ORDER — FUROSEMIDE 80 MG
80 TABLET ORAL 2 TIMES DAILY
Qty: 60 TABLET | Refills: 0 | Status: SHIPPED | OUTPATIENT
Start: 2021-02-26 | End: 2021-04-22 | Stop reason: SDUPTHER

## 2021-02-26 RX ORDER — LAMOTRIGINE 100 MG/1
100 TABLET ORAL 2 TIMES DAILY
Qty: 60 TABLET | Refills: 0 | Status: SHIPPED | OUTPATIENT
Start: 2021-02-26 | End: 2021-04-22 | Stop reason: SDUPTHER

## 2021-02-26 NOTE — TELEPHONE ENCOUNTER
Pt called requesting refills on these medications to be sent to [facility] in [place]  Pt states he is out of both medications

## 2021-03-04 DIAGNOSIS — I42.8 NICM (NONISCHEMIC CARDIOMYOPATHY) (HCC): ICD-10-CM

## 2021-03-04 DIAGNOSIS — I50.22 CHRONIC SYSTOLIC CHF (CONGESTIVE HEART FAILURE), NYHA CLASS 3 (HCC): ICD-10-CM

## 2021-03-04 DIAGNOSIS — F41.8 DEPRESSION WITH ANXIETY: ICD-10-CM

## 2021-03-04 NOTE — TELEPHONE ENCOUNTER
Patient requesting refill(s) of: potassium    Last filled: 10/12/20 #30 x 3  Last appt: 02/09/2020  Next appt:5/11/21  Pharmacy: [facility]    Patient requesting refill(s) of: clonazepam    Last filled: 9/30/2020 #60 x 0  Last appt:02/09/2020  Next appt:05/11/2020  Pharmacy: [facility]

## 2021-03-05 RX ORDER — CLONAZEPAM 1 MG/1
1 TABLET ORAL 2 TIMES DAILY
Qty: 60 TABLET | Refills: 0 | Status: SHIPPED | OUTPATIENT
Start: 2021-03-05 | End: 2021-05-28 | Stop reason: SDUPTHER

## 2021-03-05 RX ORDER — POTASSIUM CHLORIDE 20 MEQ/1
20 TABLET, EXTENDED RELEASE ORAL DAILY
Qty: 30 TABLET | Refills: 3 | Status: SHIPPED | OUTPATIENT
Start: 2021-03-05 | End: 2021-09-23 | Stop reason: SDUPTHER

## 2021-03-05 NOTE — TELEPHONE ENCOUNTER
Refills sent- I am not seeing my prescription for Klonopin in PDMP, would you mind reaching out to [facility] to see why that wasn't recorded? Thanks!

## 2021-03-05 NOTE — PROGRESS NOTES
Heart Failure Outpatient Progress Note - Lamine Figueroa 44 y o  male MRN: [id number]    @ Encounter: [id number]      Assessment/Plan:    Patient Active Problem List    Diagnosis Date Noted    Atherosclerosis of native arteries of right leg with ulceration of other part of foot ([place] [address] ) 03/22/2018     Priority: Low    CHF, chronic ([place] [address] ) 06/28/2017     Priority: Low    HTN (hypertension) 06/28/2017     Priority: Low    Alcohol abuse 06/28/2017     Priority: Low    Tobacco abuse 06/28/2017     Priority: Low    [facility]ch[facility] obstructive pulmonary disease) ([facility]     Anxiety and depression     MARRY (obstructive sleep apnea)     Peripheral arterial disease ([place] [address] ) 02/20/2018    LBBB (left bundle branch block) 08/09/2017    Dilated cardiomyopathy ([place] [address] ) 06/30/2017       1  Chronic Systolic CHF, Stage C, NYHA 2  Etiology: DNICM, possible ETOH- still drinking 4-5 beers a day  Recent hospital decompensation 10/1- up 15 lbs due to not taking lasix    Weight: baseline weight 265 lbs (admitted 10/1 at 280 lbs)- 270 lbs on DC  286 lbs today in office (294 lbs on 2 9)  NT proBNP: 10/1/20: 2075  7/6/20: 734    Studies- personally reviewed by me  Echo 11/23/20:  LVEF: 30%  LVEDd: 7 2 cm  Mild MR    Echo 10/10/19:  LVEF: 25%, mild LVH  LVEDd: 7 4 cm  RV: normal  MV: moderate MR     Echo 1/8/18: EF: 25%,LVEDd: 6 3 cm  Echo 9/27/17: EF: 10%, LVEDd: 6 75 cm     C 6/29/17 negative for obstructive CAD, LVEDP 30 mmhg    Neurohormonal Blockade:  --Beta-Blocker: Toprol XL 50 mg BID  --ACEi, ARB or ARNi: entresto 24/26 mg BID  --Aldosterone Receptor Blocker:  --Diuretic: Lasix 40 mg BID     Sudden Cardiac Death Risk Reduction:  --ICD: 10/4/17- Jan 4 interrogation: 4 sec of VT/VR at 290 bpm- terminated on own     Electrical Resynchronization:  Native  msec  --BiV placed in 10/4/17-   Positive response: EF: to 25-30% and LVEDd down to 6 3 from 6 8 with BiV pacer  Interrogation 6/3/20: 98%   17 VT - NSVT longest 14 beats, 1 AT/ AF which was PAT     Advanced therapies  Drinking and  Smoking     #  Etoh abuse history- about 4-5 beer a day  #  tobacco use- continued, did not tolerate Chantix- mood changed  # COPD  #  MARRY- cpap  # PVD- LEADs: R CFA 50-75% stenosis, LLE with no sign diz  Healed ulcers on right toes  Likely vasospasm from Buerger's dz  # lipids 7/6/20 , HDL 48  # depression- on wellbutrin and Klonopin     TODAY'S PLAN:  Discussed importance of sticking to HF regimen for long term outcomes, success  Continue GDMT for HFrEF  Discussed negative contribution of ETOH to heart failure (volume and myocardial toxicity)  I do think his drinking was driving some of his arrhythmia burden  Will check how much time he has on current battery  - Daily weights     HPI:   Donte Robles is a 44y o  year old male with a history of a cardiomyopathy diagnosed at [facility] in March 2017 presents from his PCP's office with chest pain  He presented to [facility] in March with progressive shortness of breath and a cough  He was found to have an elevated BNP and echo revealed an dialated cardiomyopathy with EF: 10% LVEDD: 6 6cm; with mild MR and mild TR  He was diuresed with IV lasix and started on metoprolol succinate  There was reportedly no ischemic workup done during that admission  He was fitted for a lifevest at the time of discharge  He was occasionally compliant with his lifevest but reportedly was compliant with his medications  He had a follow up echo after 3 months however he is unsure of the results but was being considered for a pacemaker  He was referred to EP at [place] however did not continue under their care  He has been diagnosed with MARRY and is on CPAP  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  smokes 1ppd now but as much as 3ppd for the past 40 years  drinks extensively up to 1 case of beer plus hard liquor in a day  He has cut back since March   The last time he drank heavily was about a month ago  He was in the hospital in June with chest pain and cardiac cath was negative for obstructive CAD but LVEDP was elevated at 30 mmHg  He was discharged on LIfeVest  Does not want to wear  Since discharge he started feeling really bad again in the last 3 weeks again with a band sensation of chest pain  His appetite is poor and lost 37 lbs  Zero energy  Down to less than 10 cigarettes a day  Currently lives by himself  Grown adult children  Has a girlfriend that lives around 45 minutes ago  Â 10/27: RHC in Aug CI was 1 9 and PA sat 67% and wedge 24  He underwent BiV ICD placed Oct 4 ok  Drinking about 8 beers a week             Changed to [facility], was down to 3-4 beers a day    He was off his meds for many months - his decision  Started feeling very bad, then just recently went back on his meds  Lisinopril instead of Entresto  He was also drinking heavily and 3 packs a day smoking           S/p admit for acute on chronic HF, 15 lb weight gain in 2 weeks, baseline weight 265 lbs up to 280 lbs, NT proBNP 2075  Non compliant with lasix as output  Diuresed and DC at 270 lbs  Last follow up weight was up to 281 lbs     Interval History:  Given his arrhythmia burden will changed coreg to Toprol XL 50 mg BID  Echo 11/23/20:  LVEF: 30%  LVEDd: 7 2 cm  Mild MR    Weight is up but down from 294 lbs on 2/9    Review of Systems   Constitutional: Negative for activity change, appetite change, fatigue and unexpected weight change  HENT: Negative for congestion and nosebleeds  Eyes: Negative  Respiratory: Negative for cough, chest tightness and shortness of breath  Cardiovascular: Negative for chest pain, palpitations and leg swelling  Gastrointestinal: Negative for abdominal distention  Endocrine: Negative  Genitourinary: Negative  Musculoskeletal: Negative  Skin: Negative  Neurological: Negative for dizziness, syncope and weakness  Hematological: Negative      Psychiatric/Behavioral: Negative  Past Medical History:   Diagnosis Date    Anxiety and depression     [facility]est[facility] failure[facility])     COPD (chronic obstructive pulmonary disease) (HCC)     Dilated cardiomyopathy (HCC)     Hypertension     MARRY (obstructive sleep apnea)          Allergies   Allergen Reactions    Chantix [Varenicline] Other (See Comments)     Depression           Current Outpatient Medications:     albuterol (PROVENTIL HFA,VENTOLIN HFA) 90 mcg/act inhaler, Inhale 2 puffs every 6 (six) hours as needed for wheezing or shortness of breath, Disp: 1 Inhaler, Rfl: 5    aspirin 81 mg chewable tablet, Chew 1 tablet (81 mg total) daily, Disp: 90 tablet, Rfl: 3    carvedilol (COREG) 12 5 mg tablet, Take 1 tablet (12 5 mg total) by mouth 2 (two) times a day with meals, Disp: 60 tablet, Rfl: 3    clonazePAM (KlonoPIN) 1 mg tablet, Take 1 tablet (1 mg total) by mouth 2 (two) times a day, Disp: 60 tablet, Rfl: 0    furosemide (LASIX) 80 mg tablet, Take 1 tablet (80 mg total) by mouth 2 (two) times a day, Disp: 60 tablet, Rfl: 0    lamoTRIgine (LaMICtal) 100 mg tablet, Take 1 tablet (100 mg total) by mouth 2 (two) times a day, Disp: 60 tablet, Rfl: 0    levothyroxine 25 mcg tablet, Take 1 tablet (25 mcg total) by mouth daily, Disp: 90 tablet, Rfl: 1    metoprolol succinate (TOPROL-XL) 50 mg 24 hr tablet, Take 1 tablet (50 mg total) by mouth 2 (two) times a day, Disp: 60 tablet, Rfl: 3    nitroglycerin (NITROSTAT) 0 4 mg SL tablet, Place 1 tablet (0 4 mg total) under the tongue every 5 (five) minutes as needed for chest pain, Disp: 10 tablet, Rfl: 0    potassium chloride (Klor-Con M20) 20 mEq tablet, Take 1 tablet (20 mEq total) by mouth daily, Disp: 30 tablet, Rfl: 3    QUEtiapine (SEROquel) 50 mg tablet, Take 1 tablet (50 mg total) by mouth daily at bedtime, Disp: 30 tablet, Rfl: 5    sacubitril-valsartan (Entresto) 24-26 MG TABS, Take 1 tablet by mouth 2 (two) times a day, Disp: 180 tablet, Rfl: 3    Social History     Socioeconomic History    Marital status: Single     Spouse name: Not on file    Number of children: Not on file    Years of education: Not on file    Highest education level: Not on file   Occupational History    Not on file   Social Needs    Financial resource strain: Not on file    Food insecurity     Worry: Not on file     Inability: Not on file    Transportation needs     Medical: Not on file     Non-medical: Not on file   Tobacco Use    Smoking status: Current Every Day Smoker     Packs/day: 1 50     Years: 40 00     Pack years: 60 00     Types: Cigarettes    Smokeless tobacco: Former User   Substance and Sexual Activity    Alcohol use: Yes     Frequency: 4 or more times a week     Drinks per session: 10 or more     Binge frequency: Daily or almost daily    Drug use: No    Sexual activity: Not on file   Lifestyle    Physical activity     Days per week: Not on file     Minutes per session: Not on file    Stress: Not on file   Relationships    Social connections     Talks on phone: Not on file     Gets together: Not on file     Attends [employer] service: Not on file     Active member of club or organization: Not on file     Attends meetings of clubs or organizations: Not on file     Relationship status: Not on file    Intimate partner violence     Fear of current or ex partner: Not on file     Emotionally abused: Not on file     Physically abused: Not on file     Forced sexual activity: Not on file   Other Topics Concern    Not on file   Social History Narrative    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, underground utilities, some asbestos        Family History   Problem Relation Age of Onset    Diabetes Mother     Emphysema Mother     No Known Problems Father     Emphysema Maternal Aunt        Physical Exam:    Vitals:         Physical Exam  Constitutional:       Appearance: He is well-developed  HENT:      Head: Normocephalic and atraumatic     Eyes: Pupils: Pupils are equal, round, and reactive to light  Neck:      Musculoskeletal: Normal range of motion  Vascular: No JVD  Cardiovascular:      Rate and Rhythm: Normal rate and regular rhythm  Heart sounds: No murmur  Pulmonary:      Effort: Pulmonary effort is normal  No respiratory distress  Breath sounds: Normal breath sounds  Abdominal:      General: There is no distension  Palpations: Abdomen is soft  Tenderness: There is no abdominal tenderness  Musculoskeletal: Normal range of motion  Skin:     General: Skin is warm and dry  Findings: No rash  Neurological:      Mental Status: He is alert and oriented to person, place, and time  Labs & Results:    Lab Results   Component Value Date    SODIUM 139 10/08/2020    K 3 7 10/08/2020     10/08/2020    CO2 29 10/08/2020    BUN 23 10/08/2020    CREATININE 1 11 10/08/2020    GLUC 108 (H) 10/03/2020    CALCIUM 9 3 10/08/2020     Lab Results   Component Value Date    WBC 6 10 10/03/2020    HGB 16 5 10/03/2020    HCT 47 0 10/03/2020    MCV 97 10/03/2020     10/03/2020     Lab Results   Component Value Date    NTBNP 2,075 (H) 09/30/2020      Lab Results   Component Value Date    CHOLESTEROL 216 (H) 07/06/2020    CHOLESTEROL 204 (H) 10/23/2019    CHOLESTEROL 184 06/28/2017     Lab Results   Component Value Date    HDL 48 07/06/2020    HDL 49 10/23/2019    HDL 54 06/28/2017     Lab Results   Component Value Date    TRIG 214 (H) 07/06/2020    TRIG 206 (H) 10/23/2019    TRIG 131 06/28/2017     Lab Results   Component Value Date    NONHDLC 168 07/06/2020    [place] 155 10/23/2019       EKG personally reviewed by Manuel Hamilton DO  Counseling / Coordination of Care  Time spent today 25 minutes  Greater than 50% of total time was spent with the patient and / or family counseling and / or coordination of care  We went over current diagnosis, most recent studies and any changes in treatment      Thank you for the opportunity to participate in the care of this patient    CORONA REYNA [address] Lakeshia Robb

## 2021-03-10 DIAGNOSIS — Z23 ENCOUNTER FOR IMMUNIZATION: ICD-10-CM

## 2021-03-12 ENCOUNTER — OFFICE VISIT (OUTPATIENT)
Dept: CARDIOLOGY CLINIC | Facility: CLINIC | Age: 51
End: 2021-03-12
Payer: MEDICARE

## 2021-03-12 VITALS
HEIGHT: 73 IN | OXYGEN SATURATION: 96 % | HEART RATE: 94 BPM | BODY MASS INDEX: 37.93 KG/M2 | DIASTOLIC BLOOD PRESSURE: 86 MMHG | WEIGHT: 286.2 LBS | SYSTOLIC BLOOD PRESSURE: 132 MMHG

## 2021-03-12 DIAGNOSIS — G47.33 OSA ON CPAP: ICD-10-CM

## 2021-03-12 DIAGNOSIS — I42.0 DILATED CARDIOMYOPATHY (HCC): ICD-10-CM

## 2021-03-12 DIAGNOSIS — I50.22 CHRONIC SYSTOLIC CHF (CONGESTIVE HEART FAILURE), NYHA CLASS 2 (HCC): ICD-10-CM

## 2021-03-12 DIAGNOSIS — Z99.89 OSA ON CPAP: ICD-10-CM

## 2021-03-12 DIAGNOSIS — I50.22 CHRONIC SYSTOLIC CONGESTIVE HEART FAILURE (HCC): ICD-10-CM

## 2021-03-12 DIAGNOSIS — I50.42 CHRONIC COMBINED SYSTOLIC AND DIASTOLIC CONGESTIVE HEART FAILURE (HCC): ICD-10-CM

## 2021-03-12 DIAGNOSIS — I44.7 LBBB (LEFT BUNDLE BRANCH BLOCK): Primary | ICD-10-CM

## 2021-03-12 PROCEDURE — 3008F BODY MASS INDEX DOCD: CPT | Performed by: FAMILY MEDICINE

## 2021-03-12 PROCEDURE — 99214 OFFICE O/P EST MOD 30 MIN: CPT | Performed by: INTERNAL MEDICINE

## 2021-03-12 RX ORDER — METOPROLOL SUCCINATE 100 MG/1
100 TABLET, EXTENDED RELEASE ORAL 2 TIMES DAILY
Qty: 180 TABLET | Refills: 3 | Status: SHIPPED | OUTPATIENT
Start: 2021-03-12 | End: 2021-09-23 | Stop reason: SDUPTHER

## 2021-03-12 RX ORDER — SACUBITRIL AND VALSARTAN 49; 51 MG/1; MG/1
1 TABLET, FILM COATED ORAL 2 TIMES DAILY
Qty: 180 TABLET | Refills: 3 | Status: SHIPPED | OUTPATIENT
Start: 2021-03-12 | End: 2022-06-28 | Stop reason: SDUPTHER

## 2021-03-12 NOTE — PATIENT INSTRUCTIONS
Stop taking coreg (carvedilol)    Increase metoprolol to 100 mg twice daily (can take 2 50 mg pills)    Increase Entresto to 49/51 mg Twice daily (you can take two of what you currently have)    I ordered blood work

## 2021-03-15 ENCOUNTER — REMOTE DEVICE CLINIC VISIT (OUTPATIENT)
Dept: CARDIOLOGY CLINIC | Facility: CLINIC | Age: 51
End: 2021-03-15
Payer: MEDICARE

## 2021-03-15 DIAGNOSIS — Z95.810 PRESENCE OF AUTOMATIC CARDIOVERTER/DEFIBRILLATOR (AICD): Primary | ICD-10-CM

## 2021-03-15 PROCEDURE — 93296 REM INTERROG EVL PM/IDS: CPT | Performed by: INTERNAL MEDICINE

## 2021-03-15 PROCEDURE — 93295 DEV INTERROG REMOTE 1/2/MLT: CPT | Performed by: INTERNAL MEDICINE

## 2021-03-15 NOTE — PROGRESS NOTES
Results for orders placed or performed in visit on 03/15/21   Cardiac EP device report    Narrative    [employer] BIV ICD  CARELINK TRANSMISSION: BATTERY VOLTAGE ADEQUATE  (16 MONS) AP 2% BVP 96%  ALL AVAILABLE LEAD PARAMETERS WITHIN NORMAL LIMITS HOWEVER LV LEAD THRESHOLD IS HIGH (HISTORY OF THE SAME)  NO SIGNIFICANT HIGH RATE EPISODES  VENTRICULAR SENSE EPISODE DETECTED  OPTI-VOL WITHIN NORMAL LIMITS  NORMAL DEVICE FUNCTION  ---ARCHULETA

## 2021-03-24 ENCOUNTER — APPOINTMENT (OUTPATIENT)
Dept: LAB | Facility: CLINIC | Age: 51
End: 2021-03-24
Payer: MEDICARE

## 2021-03-24 ENCOUNTER — TELEPHONE (OUTPATIENT)
Dept: FAMILY MEDICINE CLINIC | Facility: CLINIC | Age: 51
End: 2021-03-24

## 2021-03-24 DIAGNOSIS — E03.9 HYPOTHYROIDISM, UNSPECIFIED TYPE: Primary | ICD-10-CM

## 2021-03-24 DIAGNOSIS — I42.0 DILATED CARDIOMYOPATHY (HCC): ICD-10-CM

## 2021-03-24 DIAGNOSIS — D72.829 LEUKOCYTOSIS, UNSPECIFIED TYPE: ICD-10-CM

## 2021-03-24 DIAGNOSIS — E03.9 HYPOTHYROIDISM, UNSPECIFIED TYPE: ICD-10-CM

## 2021-03-24 DIAGNOSIS — R73.03 PRE-DIABETES: ICD-10-CM

## 2021-03-24 DIAGNOSIS — I50.22 CHRONIC SYSTOLIC CONGESTIVE HEART FAILURE (HCC): ICD-10-CM

## 2021-03-24 DIAGNOSIS — D58.2 ELEVATED HEMOGLOBIN (HCC): ICD-10-CM

## 2021-03-24 LAB
ALBUMIN SERPL BCP-MCNC: 3.5 G/DL (ref 3.5–5)
ALP SERPL-CCNC: 71 U/L (ref 46–116)
ALT SERPL W P-5'-P-CCNC: 69 U/L (ref 12–78)
ANION GAP SERPL CALCULATED.3IONS-SCNC: 6 MMOL/L (ref 4–13)
AST SERPL W P-5'-P-CCNC: 43 U/L (ref 5–45)
BASOPHILS # BLD AUTO: 0.08 THOUSANDS/ΜL (ref 0–0.1)
BASOPHILS NFR BLD AUTO: 1 % (ref 0–1)
BILIRUB SERPL-MCNC: 1.18 MG/DL (ref 0.2–1)
BUN SERPL-MCNC: 11 MG/DL (ref 5–25)
CALCIUM SERPL-MCNC: 9.3 MG/DL (ref 8.3–10.1)
CHLORIDE SERPL-SCNC: 104 MMOL/L (ref 100–108)
CO2 SERPL-SCNC: 28 MMOL/L (ref 21–32)
CREAT SERPL-MCNC: 1.25 MG/DL (ref 0.6–1.3)
EOSINOPHIL # BLD AUTO: 0.29 THOUSAND/ΜL (ref 0–0.61)
EOSINOPHIL NFR BLD AUTO: 3 % (ref 0–6)
ERYTHROCYTE [DISTWIDTH] IN BLOOD BY AUTOMATED COUNT: 13.6 % (ref 11.6–15.1)
EST. AVERAGE GLUCOSE BLD GHB EST-MCNC: 120 MG/DL
GFR SERPL CREATININE-BSD FRML MDRD: 66 ML/MIN/1.73SQ M
GLUCOSE P FAST SERPL-MCNC: 143 MG/DL (ref 65–99)
HBA1C MFR BLD: 5.8 %
HCT VFR BLD AUTO: 56.2 % (ref 36.5–49.3)
HGB BLD-MCNC: 19.2 G/DL (ref 12–17)
IMM GRANULOCYTES # BLD AUTO: 0.03 THOUSAND/UL (ref 0–0.2)
IMM GRANULOCYTES NFR BLD AUTO: 0 % (ref 0–2)
LYMPHOCYTES # BLD AUTO: 3.09 THOUSANDS/ΜL (ref 0.6–4.47)
LYMPHOCYTES NFR BLD AUTO: 28 % (ref 14–44)
MCH RBC QN AUTO: 32.6 PG (ref 26.8–34.3)
MCHC RBC AUTO-ENTMCNC: 34.2 G/DL (ref 31.4–37.4)
MCV RBC AUTO: 95 FL (ref 82–98)
MONOCYTES # BLD AUTO: 1.13 THOUSAND/ΜL (ref 0.17–1.22)
MONOCYTES NFR BLD AUTO: 10 % (ref 4–12)
NEUTROPHILS # BLD AUTO: 6.44 THOUSANDS/ΜL (ref 1.85–7.62)
NEUTS SEG NFR BLD AUTO: 58 % (ref 43–75)
NRBC BLD AUTO-RTO: 0 /100 WBCS
NT-PROBNP SERPL-MCNC: 3552 PG/ML
PLATELET # BLD AUTO: 276 THOUSANDS/UL (ref 149–390)
PMV BLD AUTO: 10.3 FL (ref 8.9–12.7)
POTASSIUM SERPL-SCNC: 4.1 MMOL/L (ref 3.5–5.3)
PROT SERPL-MCNC: 7.1 G/DL (ref 6.4–8.2)
RBC # BLD AUTO: 5.89 MILLION/UL (ref 3.88–5.62)
SODIUM SERPL-SCNC: 138 MMOL/L (ref 136–145)
TSH SERPL DL<=0.05 MIU/L-ACNC: 4.26 UIU/ML (ref 0.36–3.74)
WBC # BLD AUTO: 11.06 THOUSAND/UL (ref 4.31–10.16)

## 2021-03-24 PROCEDURE — 84443 ASSAY THYROID STIM HORMONE: CPT

## 2021-03-24 PROCEDURE — 36415 COLL VENOUS BLD VENIPUNCTURE: CPT | Performed by: INTERNAL MEDICINE

## 2021-03-24 PROCEDURE — 83036 HEMOGLOBIN GLYCOSYLATED A1C: CPT

## 2021-03-24 PROCEDURE — 85025 COMPLETE CBC W/AUTO DIFF WBC: CPT | Performed by: INTERNAL MEDICINE

## 2021-03-24 PROCEDURE — 83880 ASSAY OF NATRIURETIC PEPTIDE: CPT

## 2021-03-24 PROCEDURE — 80053 COMPREHEN METABOLIC PANEL: CPT

## 2021-03-24 RX ORDER — LEVOTHYROXINE SODIUM 0.05 MG/1
50 TABLET ORAL DAILY
Qty: 90 TABLET | Refills: 0 | Status: SHIPPED | OUTPATIENT
Start: 2021-03-24 | End: 2021-10-04

## 2021-03-25 NOTE — TELEPHONE ENCOUNTER
Please let the patient know I reviewed his recent blood work results-     A1c is 5 8 which is still in pre-diabetic range (work on diet/exercise)  His TSH is elevated 4 260m I would recommending increasing his levothyroxine dose and repeating his TSH in 6 weeks before our next appointment  He is currently taking levothyroxine 25 mcg, will increase to levothyroxine 50 mcg daily  Kidney function is stable  Will order repeat CBC with the TSH in 6 weeks, his WBC and hemoglobin are high, likely due to smoking  Encourage him to quit smoking

## 2021-04-21 ENCOUNTER — IMMUNIZATIONS (OUTPATIENT)
Dept: FAMILY MEDICINE CLINIC | Facility: HOSPITAL | Age: 51
End: 2021-04-21

## 2021-04-21 DIAGNOSIS — Z23 ENCOUNTER FOR IMMUNIZATION: Primary | ICD-10-CM

## 2021-04-21 PROCEDURE — 91301 SARS-COV-2 / COVID-19 MRNA VACCINE (MODERNA) 100 MCG: CPT

## 2021-04-21 PROCEDURE — 0011A SARS-COV-2 / COVID-19 MRNA VACCINE (MODERNA) 100 MCG: CPT

## 2021-04-22 DIAGNOSIS — I50.23 ACUTE ON CHRONIC SYSTOLIC (CONGESTIVE) HEART FAILURE (HCC): ICD-10-CM

## 2021-04-22 DIAGNOSIS — F41.9 ANXIETY AND DEPRESSION: ICD-10-CM

## 2021-04-22 DIAGNOSIS — F32.A ANXIETY AND DEPRESSION: ICD-10-CM

## 2021-04-22 RX ORDER — FUROSEMIDE 80 MG
80 TABLET ORAL 2 TIMES DAILY
Qty: 180 TABLET | Refills: 0 | Status: SHIPPED | OUTPATIENT
Start: 2021-04-22 | End: 2021-09-23 | Stop reason: SDUPTHER

## 2021-04-22 RX ORDER — FUROSEMIDE 80 MG
80 TABLET ORAL 2 TIMES DAILY
Qty: 60 TABLET | Refills: 0 | Status: CANCELLED | OUTPATIENT
Start: 2021-04-22 | End: 2021-05-22

## 2021-04-22 RX ORDER — LAMOTRIGINE 100 MG/1
100 TABLET ORAL 2 TIMES DAILY
Qty: 60 TABLET | Refills: 0 | Status: SHIPPED | OUTPATIENT
Start: 2021-04-22 | End: 2021-09-02 | Stop reason: SDUPTHER

## 2021-04-22 NOTE — TELEPHONE ENCOUNTER
Spoke with patient and he stated that the correct dosage of furosemide- (Lasix) is definitely 80 mg and he was increased from 40 mg to 80 mg  about 3 months ago, he states Dr Mauro Wynne was the provider to do this

## 2021-04-22 NOTE — TELEPHONE ENCOUNTER
Patient requesting refill(s) of: lasix, lamictal    Last filled:02/26/21  Last appt:2/9/21  Next appt:5/11/21  Pharmacy: [facility]

## 2021-04-22 NOTE — TELEPHONE ENCOUNTER
Please confirm Lasix dosing with patient, the most recent cardiology note says Lasix 40 mg BID not 80 mg BID  Thanks!

## 2021-04-22 NOTE — TELEPHONE ENCOUNTER
Patient requesting refill(s) of:  Furosemide (Lasix) 80mg     Last filled:2/26/2021  Last appt:2/9/2021  Next appt:5/11/2021  Pharmacy: [facility], [address], [place]

## 2021-04-23 ENCOUNTER — OFFICE VISIT (OUTPATIENT)
Dept: PULMONOLOGY | Facility: CLINIC | Age: 51
End: 2021-04-23
Payer: MEDICARE

## 2021-04-23 VITALS
BODY MASS INDEX: 37.77 KG/M2 | SYSTOLIC BLOOD PRESSURE: 120 MMHG | OXYGEN SATURATION: 96 % | WEIGHT: 285 LBS | HEIGHT: 73 IN | TEMPERATURE: 97.2 F | DIASTOLIC BLOOD PRESSURE: 80 MMHG | HEART RATE: 102 BPM

## 2021-04-23 DIAGNOSIS — G47.33 OSA (OBSTRUCTIVE SLEEP APNEA): ICD-10-CM

## 2021-04-23 DIAGNOSIS — J44.9 CHRONIC OBSTRUCTIVE PULMONARY DISEASE, UNSPECIFIED COPD TYPE (HCC): Primary | ICD-10-CM

## 2021-04-23 DIAGNOSIS — F17.210 CIGARETTE NICOTINE DEPENDENCE WITHOUT COMPLICATION: ICD-10-CM

## 2021-04-23 DIAGNOSIS — Z72.0 TOBACCO ABUSE: ICD-10-CM

## 2021-04-23 PROCEDURE — 99213 OFFICE O/P EST LOW 20 MIN: CPT | Performed by: INTERNAL MEDICINE

## 2021-04-23 NOTE — PROGRESS NOTES
Progress note - Pulmonary Medicine   Santiago Ac 44 y o  male MRN: [id number]       Impression & Plan:     COPD (chronic obstructive pulmonary disease) ([address] )  ·  Continue albuterol as needed   · Stable pulmonary symptoms    MARRY (obstructive sleep apnea)  ·  2017 [facility] sleep study with mild obstructive sleep apnea  · Intolerant to CPAP  · Offered to retest and retry CPAP  Patient declines and does not wish to return to CPAP therapy  Sleep study therefore will not be performed  · Takes Seroquel at HS to help him sleep  It does help with onset but also maintenance of sleep  Sleeps for almost 12 hours when he takes this  Suggested discussion with prescribing provider to lower the dose to 25 mg    Tobacco abuse  ·  Current smoker  Cut down from 1 5 to 1 pack per day   · Making an effort to   Cut down further  ·  based on current screening guidelines, patient does qualify given pack year history and age  Lung cancer screening study ordered       follow-up in 6 months  I will be in touch with him with the results of his lung cancer screening CT scan  ______________________________________________________________________    HPI:    Santiago Ac presents today for follow-up of  COPD  From a symptom perspective he is doing well  He does have cardiovascular symptoms related to his chronic congestive heart failure  He is followed by Dr Oleta Apgar and by Dr Mahnaz Mar  He currently has been experiencing minimal pulmonary symptoms  He has rare need for rescue albuterol  He does not require any chronic inhaler therapy otherwise  Unfortunately, he does continue to smoke but he is cutting down  He was previously smoking 1 and half packs daily and now is smoking 1 pack daily  He tries not to smoke the entire cigarette in each sitting  This has helped him reduce his overall smoking  He denies any chest pains  No leg swelling or signs or symptoms of decompensated heart failure    He does have obesity  He has obstructive sleep apnea diagnosed many years ago but was not able to sleep with the CPAP device and did not continue with therapy  His sleep apnea at that time was mild  He does not report excessive daytime somnolence  He does have difficulty falling asleep unless he takes Seroquel  The Seroquel does make him sleep fairly consistently and maintains sleep for 12 hours      Current Medications:    Current Outpatient Medications:     albuterol (PROVENTIL HFA,VENTOLIN HFA) 90 mcg/act inhaler, Inhale 2 puffs every 6 (six) hours as needed for wheezing or shortness of breath, Disp: 1 Inhaler, Rfl: 5    aspirin 81 mg chewable tablet, Chew 1 tablet (81 mg total) daily, Disp: 90 tablet, Rfl: 3    clonazePAM (KlonoPIN) 1 mg tablet, Take 1 tablet (1 mg total) by mouth 2 (two) times a day, Disp: 60 tablet, Rfl: 0    furosemide (LASIX) 80 mg tablet, Take 1 tablet (80 mg total) by mouth 2 (two) times a day, Disp: 180 tablet, Rfl: 0    lamoTRIgine (LaMICtal) 100 mg tablet, Take 1 tablet (100 mg total) by mouth 2 (two) times a day, Disp: 60 tablet, Rfl: 0    levothyroxine 50 mcg tablet, Take 1 tablet (50 mcg total) by mouth daily, Disp: 90 tablet, Rfl: 0    metoprolol succinate (TOPROL-XL) 100 mg 24 hr tablet, Take 1 tablet (100 mg total) by mouth 2 (two) times a day, Disp: 180 tablet, Rfl: 3    nitroglycerin (NITROSTAT) 0 4 mg SL tablet, Place 1 tablet (0 4 mg total) under the tongue every 5 (five) minutes as needed for chest pain, Disp: 10 tablet, Rfl: 0    potassium chloride (Klor-Con M20) 20 mEq tablet, Take 1 tablet (20 mEq total) by mouth daily, Disp: 30 tablet, Rfl: 3    QUEtiapine (SEROquel) 50 mg tablet, Take 1 tablet (50 mg total) by mouth daily at bedtime, Disp: 30 tablet, Rfl: 5    sacubitril-valsartan (Entresto) 49-51 MG TABS, Take 1 tablet by mouth 2 (two) times a day, Disp: 180 tablet, Rfl: 3    Review of Systems:  Aside from what is mentioned in the HPI, the review of systems is otherwise negative    Past medical history, surgical history, and family history were reviewed and updated as appropriate    Social history updates:  Social History     Tobacco Use   Smoking Status Current Every Day Smoker    Packs/day: 1 50    Years: 40 00    Pack years: 60 00    Types: Cigarettes   Smokeless Tobacco Former Paulina    currently, 1ppd or less       PhysicalExamination:  Vitals:   /80 (BP Location: Left arm)   Pulse 102   Temp (!) 97 2 °F (36 2 °C) (Tympanic)   Ht 6' 1" (1 854 m)   Wt 129 kg (285 lb)   SpO2 96%   BMI 37 60 kg/m²     Gen:   Comfortable on room air at rest today  HEENT:  Conjugate gaze  Sclerae anicteric  Oropharynx exam deferred due to COVID-19 and face mask  Neck: Supple  There is no JVD, lymphadenopathy or thyromegaly  Trachea is midline  Chest:  Chest excursion symmetric  Breath sounds are distant bilaterally but otherwise clear  Hyper-resonant to percussion  Cardiac:   Regular but distant heart tones  There are no murmurs appreciated  Abdomen:   Obese, Benign  Extremities: No clubbing, cyanosis or edema  Neurologic:  Normal speech and mentation  Normal affect  Skin: No appreciable rashes  Diagnostic Data:  Labs:   I personally reviewed the most recent laboratory data pertinent to today's visit    Lab Results   Component Value Date    WBC 11 06 (H) 03/24/2021    HGB 19 2 (H) 03/24/2021    HCT 56 2 (H) 03/24/2021    MCV 95 03/24/2021     03/24/2021     Lab Results   Component Value Date    SODIUM 138 03/24/2021    K 4 1 03/24/2021    CO2 28 03/24/2021     03/24/2021    BUN 11 03/24/2021    CREATININE 1 25 03/24/2021    CALCIUM 9 3 03/24/2021       Other studies:   December 2020 echocardiogram shows persistently low ejection fraction at 30%  With dyssynergy motion    Katherin Mccall MD

## 2021-04-23 NOTE — ASSESSMENT & PLAN NOTE
·  Current smoker  Cut down from 1 5 to 1 pack per day   · Making an effort to   Cut down further  ·  based on current screening guidelines, patient does qualify given pack year history and age    Lung cancer screening study ordered

## 2021-04-23 NOTE — ASSESSMENT & PLAN NOTE
·  April 2017 [facility] sleep study with mild obstructive sleep apnea  · Intolerant to CPAP  · Offered to retest and retry CPAP  Patient declines and does not wish to return to CPAP therapy  Sleep study therefore will not be performed  · Takes Seroquel at HS to help him sleep  It does help with onset but also maintenance of sleep  Sleeps for almost 12 hours when he takes this    Suggested discussion with prescribing provider to lower the dose to 25 mg

## 2021-05-05 ENCOUNTER — HOSPITAL ENCOUNTER (OUTPATIENT)
Dept: CT IMAGING | Facility: HOSPITAL | Age: 51
Discharge: HOME | End: 2021-05-05
Attending: INTERNAL MEDICINE
Payer: MEDICARE

## 2021-05-05 DIAGNOSIS — F17.210 CIGARETTE NICOTINE DEPENDENCE WITHOUT COMPLICATION: ICD-10-CM

## 2021-05-05 PROCEDURE — 71271 CT THORAX LUNG CANCER SCR C-: CPT

## 2021-05-11 ENCOUNTER — OFFICE VISIT (OUTPATIENT)
Dept: FAMILY MEDICINE CLINIC | Facility: CLINIC | Age: 51
End: 2021-05-11
Payer: MEDICARE

## 2021-05-11 VITALS
HEART RATE: 100 BPM | SYSTOLIC BLOOD PRESSURE: 140 MMHG | HEIGHT: 73 IN | WEIGHT: 286 LBS | RESPIRATION RATE: 20 BRPM | BODY MASS INDEX: 37.91 KG/M2 | OXYGEN SATURATION: 98 % | DIASTOLIC BLOOD PRESSURE: 90 MMHG | TEMPERATURE: 97 F

## 2021-05-11 DIAGNOSIS — I42.0 DILATED CARDIOMYOPATHY (HCC): ICD-10-CM

## 2021-05-11 DIAGNOSIS — Z11.4 SCREENING FOR HIV (HUMAN IMMUNODEFICIENCY VIRUS): ICD-10-CM

## 2021-05-11 DIAGNOSIS — Z00.00 MEDICARE ANNUAL WELLNESS VISIT, INITIAL: Primary | ICD-10-CM

## 2021-05-11 DIAGNOSIS — G47.00 INSOMNIA, UNSPECIFIED TYPE: ICD-10-CM

## 2021-05-11 DIAGNOSIS — I50.22 CHRONIC SYSTOLIC CONGESTIVE HEART FAILURE (HCC): ICD-10-CM

## 2021-05-11 DIAGNOSIS — Z11.59 ENCOUNTER FOR HEPATITIS C SCREENING TEST FOR LOW RISK PATIENT: ICD-10-CM

## 2021-05-11 DIAGNOSIS — F10.10 ALCOHOL ABUSE: ICD-10-CM

## 2021-05-11 DIAGNOSIS — Z12.5 SCREENING FOR PROSTATE CANCER: ICD-10-CM

## 2021-05-11 DIAGNOSIS — E03.9 HYPOTHYROIDISM, UNSPECIFIED TYPE: ICD-10-CM

## 2021-05-11 DIAGNOSIS — F41.8 DEPRESSION WITH ANXIETY: ICD-10-CM

## 2021-05-11 PROCEDURE — G0402 INITIAL PREVENTIVE EXAM: HCPCS | Performed by: FAMILY MEDICINE

## 2021-05-11 RX ORDER — QUETIAPINE FUMARATE 25 MG/1
25 TABLET, FILM COATED ORAL
Start: 2021-05-11 | End: 2022-07-12

## 2021-05-11 NOTE — PATIENT INSTRUCTIONS
Medicare Preventive Visit Patient Instructions  Thank you for completing your Welcome to Medicare Visit or Medicare Annual Wellness Visit today  Your next wellness visit will be due in one year (5/12/2022)  The screening/preventive services that you may require over the next 5-10 years are detailed below  Some tests may not apply to you based off risk factors and/or age  Screening tests ordered at today's visit but not completed yet may show as past due  Also, please note that scanned in results may not display below  Preventive Screenings:  Service Recommendations Previous Testing/Comments   Colorectal Cancer Screening  · Colonoscopy    · Fecal Occult Blood Test (FOBT)/Fecal Immunochemical Test (FIT)  · Fecal DNA/Cologuard Test  · Flexible Sigmoidoscopy Age: 54-65 years old   Colonoscopy: every 10 years (May be performed more frequently if at higher risk)  OR  FOBT/FIT: every 1 year  OR  Cologuard: every 3 years  OR  Sigmoidoscopy: every 5 years  Screening may be recommended earlier than age 48 if at higher risk for colorectal cancer  Also, an individualized decision between you and your healthcare provider will decide whether screening between the ages of 74-80 would be appropriate   Colonoscopy: Not on file  FOBT/FIT: Not on file  Cologuard: Not on file  Sigmoidoscopy: Not on file          Prostate Cancer Screening Individualized decision between patient and health care provider in men between ages of 53-78   Medicare will cover every 12 months beginning on the day after your 50th birthday PSA: No results in last 5 years           Hepatitis C Screening Once for adults born between [facility]  More frequently in patients at high risk for Hepatitis C Hep C Antibody: Not on file        Diabetes Screening 1-2 times per year if you're at risk for diabetes or have pre-diabetes Fasting glucose: 143 mg/dL   A1C: 5 8 %    Screening Current   Cholesterol Screening Once every 5 years if you don't have a lipid disorder  May order more often based on risk factors  Lipid panel: 07/06/2020    Screening Current      Other Preventive Screenings Covered by Medicare:  1  Abdominal Aortic Aneurysm (AAA) Screening: covered once if your at risk  You're considered to be at risk if you have a family history of AAA or a male between the age of 73-68 who smoking at least 100 cigarettes in your lifetime  2  Lung Cancer Screening: covers low dose CT scan once per year if you meet all of the following conditions: (1) Age 50-69; (2) No signs or symptoms of lung cancer; (3) Current smoker or have quit smoking within the last 15 years; (4) You have a tobacco smoking history of at least 30 pack years (packs per day x number of years you smoked); (5) You get a written order from a healthcare provider  3  Glaucoma Screening: covered annually if you're considered high risk: (1) You have diabetes OR (2) Family history of glaucoma OR (3)  aged 48 and older OR (3)  American aged 72 and older  3  Osteoporosis Screening: covered every 2 years if you meet one of the following conditions: (1) Have a vertebral abnormality; (2) On glucocorticoid therapy for more than 3 months; (3) Have primary hyperparathyroidism; (4) On osteoporosis medications and need to assess response to drug therapy  5  HIV Screening: covered annually if you're between the age of 12-76  Also covered annually if you are younger than 13 and older than 72 with risk factors for HIV infection  For pregnant patients, it is covered up to 3 times per pregnancy      Immunizations:  Immunization Recommendations   Influenza Vaccine Annual influenza vaccination during flu season is recommended for all persons aged >= 6 months who do not have contraindications   Pneumococcal Vaccine (Prevnar and Pneumovax)  * Prevnar = PCV13  * Pneumovax = PPSV23 Adults 25-60 years old: 1-3 doses may be recommended based on certain risk factors  Adults 72 years old: Prevnar (PCV13) vaccine recommended followed by Pneumovax (PPSV23) vaccine  If already received PPSV23 since turning 65, then PCV13 recommended at least one year after PPSV23 dose  Hepatitis B Vaccine 3 dose series if at intermediate or high risk (ex: diabetes, end stage renal disease, liver disease)   Tetanus (Td) Vaccine - COST NOT COVERED BY MEDICARE PART B Following completion of primary series, a booster dose should be given every 10 years to maintain immunity against tetanus  Td may also be given as tetanus wound prophylaxis  Tdap Vaccine - COST NOT COVERED BY MEDICARE PART B Recommended at least once for all adults  For pregnant patients, recommended with each pregnancy  Shingles Vaccine (Shingrix) - COST NOT COVERED BY MEDICARE PART B  2 shot series recommended in those aged 48 and above     Health Maintenance Due:      Topic Date Due    HIV Screening  Never done    Lung Cancer Screening  Never done    Colorectal Cancer Screening  Never done     Immunizations Due:      Topic Date Due    DTaP,Tdap,and Td Vaccines (1 - Tdap) Never done     Advance Directives   What are advance directives? Advance directives are legal documents that state your wishes and plans for medical care  These plans are made ahead of time in case you lose your ability to make decisions for yourself  Advance directives can apply to any medical decision, such as the treatments you want, and if you want to donate organs  What are the types of advance directives? There are many types of advance directives, and each state has rules about how to use them  You may choose a combination of any of the following:  · Living will: This is a written record of the treatment you want  You can also choose which treatments you do not want, which to limit, and which to stop at a certain time  This includes surgery, medicine, IV fluid, and tube feedings  · Durable power of  for healthcare [facility]):   This is a written record that states who you want to make healthcare choices for you when you are unable to make them for yourself  This person, called a proxy, is usually a family member or a friend  You may choose more than 1 proxy  · Do not resuscitate (DNR) order:  A DNR order is used in case your heart stops beating or you stop breathing  It is a request not to have certain forms of treatment, such as CPR  A DNR order may be included in other types of advance directives  · Medical directive: This covers the care that you want if you are in a coma, near death, or unable to make decisions for yourself  You can list the treatments you want for each condition  Treatment may include pain medicine, surgery, blood transfusions, dialysis, IV or tube feedings, and a ventilator (breathing machine)  · Values history: This document has questions about your views, beliefs, and how you feel and think about life  This information can help others choose the care that you would choose  Why are advance directives important? An advance directive helps you control your care  Although spoken wishes may be used, it is better to have your wishes written down  Spoken wishes can be misunderstood, or not followed  Treatments may be given even if you do not want them  An advance directive may make it easier for your family to make difficult choices about your care  Cigarette Smoking and Your Health   Risks to your health if you smoke:  Nicotine and other chemicals found in tobacco damage every cell in your body  Even if you are a light smoker, you have an increased risk for cancer, heart disease, and lung disease  If you are pregnant or have diabetes, smoking increases your risk for complications  Benefits to your health if you stop smoking:   · You decrease respiratory symptoms such as coughing, wheezing, and shortness of breath  · You reduce your risk for cancers of the lung, mouth, throat, kidney, bladder, pancreas, stomach, and cervix   If you already have cancer, you increase the benefits of chemotherapy  You also reduce your risk for cancer returning or a second cancer from developing  · You reduce your risk for heart disease, blood clots, heart attack, and stroke  · You reduce your risk for lung infections, and diseases such as pneumonia, asthma, chronic bronchitis, and emphysema  · Your circulation improves  More oxygen can be delivered to your body  If you have diabetes, you lower your risk for complications, such as kidney, artery, and eye diseases  You also lower your risk for nerve damage  Nerve damage can lead to amputations, poor vision, and blindness  · You improve your body's ability to heal and to fight infections  For more information and support to stop smoking:   · [employer]  Phone: 3- 572 - [phone]  Web Address: [employer]  Weight Management   Why it is important to manage your weight:  Being overweight increases your risk of health conditions such as heart disease, high blood pressure, type 2 diabetes, and certain types of cancer  It can also increase your risk for osteoarthritis, sleep apnea, and other respiratory problems  Aim for a slow, steady weight loss  Even a small amount of weight loss can lower your risk of health problems  How to lose weight safely:  A safe and healthy way to lose weight is to eat fewer calories and get regular exercise  You can lose up about 1 pound a week by decreasing the number of calories you eat by 500 calories each day  Healthy meal plan for weight management:  A healthy meal plan includes a variety of foods, contains fewer calories, and helps you stay healthy  A healthy meal plan includes the following:  · Eat whole-grain foods more often  A healthy meal plan should contain fiber  Fiber is the part of grains, fruits, and vegetables that is not broken down by your body  Whole-grain foods are healthy and provide extra fiber in your diet   Some examples of whole-grain foods are whole-wheat breads and pastas, oatmeal, brown rice, and bulgur  · Eat a variety of vegetables every day  Include dark, leafy greens such as spinach, kale, maddi greens, and mustard greens  Eat yellow and orange vegetables such as carrots, sweet potatoes, and winter squash  · Eat a variety of fruits every day  Choose fresh or canned fruit (canned in its own juice or light syrup) instead of juice  Fruit juice has very little or no fiber  · Eat low-fat dairy foods  Drink fat-free (skim) milk or 1% milk  Eat fat-free yogurt and low-fat cottage cheese  Try low-fat cheeses such as mozzarella and other reduced-fat cheeses  · Choose meat and other protein foods that are low in fat  Choose beans or other legumes such as split peas or lentils  Choose fish, skinless poultry (chicken or turkey), or lean cuts of red meat (beef or pork)  Before you cook meat or poultry, cut off any visible fat  · Use less fat and oil  Try baking foods instead of frying them  Add less fat, such as margarine, sour cream, regular salad dressing and mayonnaise to foods  Eat fewer high-fat foods  Some examples of high-fat foods include french fries, doughnuts, ice cream, and cakes  · Eat fewer sweets  Limit foods and drinks that are high in sugar  This includes candy, cookies, regular soda, and sweetened drinks  Exercise:  Exercise at least 30 minutes per day on most days of the week  Some examples of exercise include walking, biking, dancing, and swimming  You can also fit in more physical activity by taking the stairs instead of the elevator or parking farther away from stores  Ask your healthcare provider about the best exercise plan for you  © Copyright [employer] 2018 Information is for End User's use only and may not be sold, redistributed or otherwise used for commercial purposes   All illustrations and images included in CareNotes® are the copyrighted property of [employer] M , [employer]  or [address] [place] [address]

## 2021-05-11 NOTE — PROGRESS NOTES
Assessment and Plan:     Problem List Items Addressed This Visit        Cardiovascular and Mediastinum    CHF, chronic ([facility] )    Dilated cardiomyopathy ([facility] )       Other    Alcohol abuse    Relevant Medications    QUEtiapine (SEROquel) 25 mg tablet      Other Visit Diagnoses     Medicare annual wellness visit, initial    -  Primary    BMI 37 0-37 9, adult        Relevant Orders    Ambulatory referral to Weight Management    Insomnia, unspecified type        Relevant Medications    QUEtiapine (SEROquel) 25 mg tablet    Hypothyroidism, unspecified type        Depression with anxiety        Relevant Medications    QUEtiapine (SEROquel) 25 mg tablet    Other Relevant Orders    Basic metabolic panel    Screening for prostate cancer        Relevant Orders    PSA, Total Screen    Screening for HIV (human immunodeficiency virus)        Relevant Orders    HIV 1/2 Antigen/Antibody (4th Generation) w Reflex [facility]    Encounter for hepatitis C screening test for low risk patient        Relevant Orders    Hepatitis C antibody        BMI Counseling: Body mass index is 37 73 kg/m²  The BMI is above normal  Nutrition recommendations include decreasing portion sizes, encouraging healthy choices of fruits and vegetables, decreasing fast food intake, consuming healthier snacks, moderation in carbohydrate intake, increasing intake of lean protein and reducing intake of saturated and trans fat  Exercise recommendations include moderate physical activity 150 minutes/week  Tobacco Cessation Counseling: Tobacco cessation counseling was provided  The patient is sincerely urged to quit consumption of tobacco  He is not ready to quit tobacco  Medication options discussed  Patient refused medication  Preventive health issues were discussed with patient, and age appropriate screening tests were ordered as noted in patient's After Visit Summary    Personalized health advice and appropriate referrals for health education or preventive services given if needed, as noted in patient's After Visit Summary  History of Present Illness:     Patient presents for Medicare Annual Wellness visit    Hypothyroidism- Currently taking levothyroxine 50 mcg daily, due for repeat TSH  Blood work ordered       Patient Care Team:  Shae Alberts DO as PCP - General (Family Medicine)  Mike Abarca MD as PCP - [address] [place], [place] [address] [facility] ([facility])  DO Bishnu Tyson DO Ali Nova, MD     Problem List:     Patient Active Problem List   Diagnosis    [facility]F, chronic ([facility] )    HTN (hypertension)    Alcohol abuse    Tobacco abuse    Peripheral arterial disease ([place] [address] )    Atherosclerosis of native arteries of right leg with ulceration of other part of foot ([place] [address] )    LBBB (left bundle branch block)    Dilated cardiomyopathy ([place] [address] )    COPD (chronic obstructive pulmonary disease) ([place] [address] )    Anxiety and depression    MARRY (obstructive sleep apnea)      Past Medical and Surgical History:     Past Medical History:   Diagnosis Date    Anxiety and depression     CHF (congestive heart failure) ([place] [address] )     COPD (chronic obstructive pulmonary disease) ([place] [address] )     Dilated cardiomyopathy ([place] [address] )     Hypertension     MARRY (obstructive sleep apnea)      Past Surgical History:   Procedure Laterality Date    BRONCHOSCOPY  03/10/2017    CARDIAC PACEMAKER PLACEMENT  10/04/2017      Family History:     Family History   Problem Relation Age of Onset    Diabetes Mother     Emphysema Mother     No Known Problems Father     Emphysema Maternal Aunt       Social History:        Social History     Socioeconomic History    Marital status: Single     Spouse name: None    Number of children: None    Years of education: None    Highest education level: None   Occupational History    None   Social Needs    Financial resource strain: None    Food insecurity     Worry: None     Inability: None    Transportation needs     Medical: None     Non-medical: None   Tobacco Use    Smoking status: Current Every Day Smoker     Packs/day: 1 50     Years: 40 00     Pack years: 60 00     Types: Cigarettes    Smokeless tobacco: Former User    Tobacco comment: currently, 1ppd or less   Substance and Sexual Activity    Alcohol use: Yes     Frequency: 4 or more times a week     Drinks per session: 10 or more     Binge frequency: Daily or almost daily    Drug use: No    Sexual activity: None   Lifestyle    Physical activity     Days per week: None     Minutes per session: None    Stress: None   Relationships    Social connections     Talks on phone: None     Gets together: None     Attends [employer] service: None     Active member of club or organization: None     Attends meetings of clubs or organizations: None     Relationship status: None    Intimate partner violence     Fear of current or ex partner: None     Emotionally abused: None     Physically abused: None     Forced sexual activity: None   Other Topics Concern    None   Social History Narrative    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, underground utilities, some asbestos       Medications and Allergies:     Current Outpatient Medications   Medication Sig Dispense Refill    albuterol (PROVENTIL HFA,VENTOLIN HFA) 90 mcg/act inhaler Inhale 2 puffs every 6 (six) hours as needed for wheezing or shortness of breath 1 Inhaler 5    aspirin 81 mg chewable tablet Chew 1 tablet (81 mg total) daily 90 tablet 3    clonazePAM (KlonoPIN) 1 mg tablet Take 1 tablet (1 mg total) by mouth 2 (two) times a day 60 tablet 0    furosemide (LASIX) 80 mg tablet Take 1 tablet (80 mg total) by mouth 2 (two) times a day 180 tablet 0    lamoTRIgine (LaMICtal) 100 mg tablet Take 1 tablet (100 mg total) by mouth 2 (two) times a day 60 tablet 0    levothyroxine 50 mcg tablet Take 1 tablet (50 mcg total) by mouth daily 90 tablet 0    metoprolol succinate (TOPROL-XL) 100 mg 24 hr tablet Take 1 tablet (100 mg total) by mouth 2 (two) times a day 180 tablet 3    nitroglycerin (NITROSTAT) 0 4 mg SL tablet Place 1 tablet (0 4 mg total) under the tongue every 5 (five) minutes as needed for chest pain 10 tablet 0    potassium chloride (Klor-Con M20) 20 mEq tablet Take 1 tablet (20 mEq total) by mouth daily 30 tablet 3    QUEtiapine (SEROquel) 25 mg tablet Take 1 tablet (25 mg total) by mouth daily at bedtime      sacubitril-valsartan (Entresto) 49-51 MG TABS Take 1 tablet by mouth 2 (two) times a day 180 tablet 3     No current facility-administered medications for this visit  Allergies   Allergen Reactions    Chantix [Varenicline] Other (See Comments)     Depression      Immunizations:     Immunization History   Administered Date(s) Administered    Influenza, recombinant, quadrivalent,injectable, preservative free 10/01/2020    Pneumococcal Polysaccharide PPV23 10/27/2020    SARS-CoV-2 / COVID-19 mRNA IM (Moderna) 04/21/2021    Tuberculin Skin Test-PPD Intradermal 03/18/2013      Health Maintenance:         Topic Date Due    HIV Screening  Never done    Colorectal Cancer Screening  Never done    Lung Cancer Screening  05/05/2022         Topic Date Due    DTaP,Tdap,and Td Vaccines (1 - Tdap) Never done      Medicare Health Risk Assessment:     /90 (BP Location: Left arm, Patient Position: Sitting, Cuff Size: Standard)   Pulse 100   Temp (!) 97 °F (36 1 °C)   Resp 20   Ht 6' 1" (1 854 m)   Wt 130 kg (286 lb)   SpO2 98%   BMI 37 73 kg/m²      Physical Exam   Constitutional: He is oriented to person, place, and time  He appears well-developed and well-nourished  No distress  HENT:   Head: Normocephalic and atraumatic  Neck: Normal range of motion  Neck supple  Cardiovascular: Normal heart sounds  Exam reveals no gallop and no friction rub  No murmur heard  Mildly tachycardic, distant heart sounds   Pulmonary/Chest: Effort normal  No respiratory distress  He has no wheezes  He has rales     Trace bibasilar rales with good air movement    Musculoskeletal:         General: Edema present  Comments: Bilateral 2+ pitting edema    Neurological: He is alert and oriented to person, place, and time  Skin: Skin is warm  No rash noted  He is not diaphoretic  No pallor  Psychiatric: He has a normal mood and affect  His behavior is normal    Vitals reviewed  Lifestyle   Physical activity    Days per week: Not on file    Minutes per session: Not on file         Lurdes Osborne is here for his Welcome to Medicare visit  Health Risk Assessment:   Patient rates overall health as good  Patient feels that their physical health rating is same  Patient is very satisfied with their life  Eyesight was rated as same  Hearing was rated as same  Patient feels that their emotional and mental health rating is much better  Patients states they are never, rarely angry  Patient states they are sometimes unusually tired/fatigued  Pain experienced in the last 7 days has been none  Patient states that he has experienced no weight loss or gain in last 6 months  Sometimes tired and fatigued, when he takes Seroquel gets very tired immediately  Takes it when his mind is racing, takes it once or twice a week  We discussed trial of decreasing his dose, patient is agreable  Depression Screening:   PHQ-2 Score: 0  PHQ-9 Score: 0      Fall Risk Screening: In the past year, patient has experienced: no history of falling in past year      Home Safety:  Patient has trouble with stairs inside or outside of their home  Patient has working smoke alarms and has working carbon monoxide detector  Home safety hazards include: none  Sometimes trouble with stairs due to breathing, gets shortness of breath with exertion  No chest pain recently  As noted in exam, he is mildly fluid-overloaded today with trace rales and pitting edema  Took double dose of Lasix this morning due to this, advised monitor closely and call with any worsening symptoms   Follows with cardiology closely  Nutrition:   Current diet is Regular and Frequent junk food  Alcohol use discussed, he is working on [employer] back, not interested in quitting  He eats typically once a day, admits to poor diet, weight has been remaining steady around 286 lb over winter  Medications:   Patient is currently taking over-the-counter supplements  OTC medications include: see medication list  Patient is able to manage medications  Activities of Daily Living (ADLs)/Instrumental Activities of Daily Living (IADLs):   Walk and transfer into and out of bed and chair?: Yes  Dress and groom yourself?: Yes    Bathe or shower yourself?: Yes    Feed yourself? Yes  Do your laundry/housekeeping?: Yes  Manage your money, pay your bills and track your expenses?: Yes  Make your own meals?: Yes    Do your own shopping?: Yes    Previous Hospitalizations:   Any hospitalizations or ED visits within the last 12 months?: Yes    How many hospitalizations have you had in the last year?: 1-2    Advance Care Planning:   Living will: No    Durable POA for healthcare: No    Advanced directive: No    Five wishes given: Yes      Comments: Rosa Henning significant other decision maker, nothing in writing       Cognitive Screening:   Provider or family/friend/caregiver concerned regarding cognition?: No    PREVENTIVE SCREENINGS      Cardiovascular Screening:    General: Screening Not Indicated and History Lipid Disorder      Diabetes Screening:     General: Screening Current      Colorectal Cancer Screening:     General: Patient Declines and Risks and Benefits Discussed      Prostate Cancer Screening:    General: Risks and Benefits Discussed    Due for: PSA      Osteoporosis Screening:    General: Screening Not Indicated      Abdominal Aortic Aneurysm (AAA) Screening:    Risk factors include: tobacco use        General: Screening Not Indicated      Lung Cancer Screening:     General: Screening Current      Hepatitis C Screening:    General: Risks and Benefits Discussed    Hep C Screening Accepted: Yes      Screening, Brief Intervention, and Referral to Treatment (SBIRT)    Screening  Typical number of drinks in a day: 6  Typical number of drinks in a week: 30  Interpretation: Risky drinking behavior  AUDIT-C Screenin) How often did you have a drink containing alcohol in the past year? 4 or more times a week  2) How many drinks did you have on a typical day when you were drinking in the past year? 10 or more  3) How often did you have 6 or more drinks on one occasion in the past year? daily or almost daily    AUDIT-C Score: 12  Interpretation: Score 4-12 (male): POSITIVE screen for alcohol misuse    AUDIT Screenin) How often during the last year have you found that you were not able to stop drinking once you had started? 0 - never  5) How often during the last year have you failed to do what was normally expected from you because of drinking? 0 - never  6) How often during the last year have you needed a first drink in the morning to get yourself going after a heavy drinking session? 0 - never  7) How often during the last year have you had a feeling of guilt or remorse after drinking? 0 - never  8) How often during the last year have you been unable to remember what happened the night before because you had been drinking? 0 - never  9) Have you or someone else been injured as a result of your drinking? 0 - no  10) Has a relative or friend or a doctor or another health worker been concerned about your drinking or suggested you cut down?  4 - yes, during the last year    AUDIT Score: 16  Interpretation: High risk alcohol consumption; likely alcohol dependence    Single Item Drug Screening:  How often have you used an illegal drug (including marijuana) or a prescription medication for non-medical reasons in the past year? never    Single Item Drug Screen Score: 0  Interpretation: Negative screen for possible drug use disorder    Brief Intervention  Healthy alcohol use/limits discussed  Health risks of current substance use discussed  Alcohol cessation counseling given  Recommended patient attend alcoholics anonymous       Time Spent  Time spent providing alcohol/substance abuse assessment and intervention services: 5 minutes      Caitlyn Lyn DO

## 2021-05-20 ENCOUNTER — IMMUNIZATIONS (OUTPATIENT)
Dept: FAMILY MEDICINE CLINIC | Facility: HOSPITAL | Age: 51
End: 2021-05-20

## 2021-05-20 DIAGNOSIS — Z23 ENCOUNTER FOR IMMUNIZATION: Primary | ICD-10-CM

## 2021-05-20 PROCEDURE — 91301 SARS-COV-2 / COVID-19 MRNA VACCINE (MODERNA) 100 MCG: CPT

## 2021-05-20 PROCEDURE — 0012A SARS-COV-2 / COVID-19 MRNA VACCINE (MODERNA) 100 MCG: CPT

## 2021-05-28 DIAGNOSIS — F41.8 DEPRESSION WITH ANXIETY: ICD-10-CM

## 2021-05-28 NOTE — TELEPHONE ENCOUNTER
Patient requesting refill(s) of: clonazepam 1 mg BID    Last filled:3/5/21 #60 x 0  Last appt:5/11/21  Next appt:8/17/21  Pharmacy: [facility]

## 2021-05-29 RX ORDER — CLONAZEPAM 1 MG/1
1 TABLET ORAL 2 TIMES DAILY
Qty: 60 TABLET | Refills: 0 | Status: SHIPPED | OUTPATIENT
Start: 2021-05-29 | End: 2021-07-26 | Stop reason: SDUPTHER

## 2021-07-15 ENCOUNTER — HOSPITAL ENCOUNTER (EMERGENCY)
Facility: HOSPITAL | Age: 51
DRG: 175 | End: 2021-07-16
Attending: EMERGENCY MEDICINE | Admitting: EMERGENCY MEDICINE
Payer: MEDICARE

## 2021-07-15 DIAGNOSIS — I26.92 ACUTE SADDLE PULMONARY EMBOLISM (HCC): Primary | ICD-10-CM

## 2021-07-15 LAB
ALBUMIN SERPL BCP-MCNC: 3.7 G/DL (ref 3.5–5.7)
ALP SERPL-CCNC: 55 U/L (ref 40–150)
ALT SERPL W P-5'-P-CCNC: 18 U/L (ref 7–52)
ANION GAP SERPL CALCULATED.3IONS-SCNC: 8 MMOL/L (ref 4–13)
APTT PPP: 28 SECONDS (ref 23–37)
AST SERPL W P-5'-P-CCNC: 25 U/L (ref 13–39)
BASOPHILS # BLD AUTO: 0.1 THOUSANDS/ΜL (ref 0–0.1)
BASOPHILS NFR BLD AUTO: 1 % (ref 0–2)
BILIRUB SERPL-MCNC: 0.7 MG/DL (ref 0.2–1)
BNP SERPL-MCNC: 273 PG/ML (ref 1–100)
BUN SERPL-MCNC: 14 MG/DL (ref 7–25)
CALCIUM SERPL-MCNC: 9.4 MG/DL (ref 8.6–10.5)
CHLORIDE SERPL-SCNC: 101 MMOL/L (ref 98–107)
CO2 SERPL-SCNC: 25 MMOL/L (ref 21–31)
CREAT SERPL-MCNC: 0.94 MG/DL (ref 0.7–1.3)
D DIMER PPP FEU-MCNC: 9.74 UG/ML FEU
EOSINOPHIL # BLD AUTO: 0.2 THOUSAND/ΜL (ref 0–0.61)
EOSINOPHIL NFR BLD AUTO: 1 % (ref 0–5)
ERYTHROCYTE [DISTWIDTH] IN BLOOD BY AUTOMATED COUNT: 13.9 % (ref 11.5–14.5)
GFR SERPL CREATININE-BSD FRML MDRD: 93 ML/MIN/1.73SQ M
GLUCOSE SERPL-MCNC: 120 MG/DL (ref 65–99)
HCT VFR BLD AUTO: 49.6 % (ref 42–47)
HGB BLD-MCNC: 16.7 G/DL (ref 14–18)
INR PPP: 1.03 (ref 0.84–1.19)
LYMPHOCYTES # BLD AUTO: 2.5 THOUSANDS/ΜL (ref 0.6–4.47)
LYMPHOCYTES NFR BLD AUTO: 18 % (ref 21–51)
MCH RBC QN AUTO: 32.1 PG (ref 26–34)
MCHC RBC AUTO-ENTMCNC: 33.7 G/DL (ref 31–37)
MCV RBC AUTO: 95 FL (ref 81–99)
MONOCYTES # BLD AUTO: 1.2 THOUSAND/ΜL (ref 0.17–1.22)
MONOCYTES NFR BLD AUTO: 9 % (ref 2–12)
NEUTROPHILS # BLD AUTO: 9.8 THOUSANDS/ΜL (ref 1.4–6.5)
NEUTS SEG NFR BLD AUTO: 71 % (ref 42–75)
PLATELET # BLD AUTO: 138 THOUSANDS/UL (ref 149–390)
PMV BLD AUTO: 8.7 FL (ref 8.6–11.7)
POTASSIUM SERPL-SCNC: 4 MMOL/L (ref 3.5–5.5)
PROT SERPL-MCNC: 6.8 G/DL (ref 6.4–8.9)
PROTHROMBIN TIME: 13.4 SECONDS (ref 11.6–14.5)
RBC # BLD AUTO: 5.21 MILLION/UL (ref 4.3–5.9)
SODIUM SERPL-SCNC: 134 MMOL/L (ref 134–143)
TROPONIN I SERPL-MCNC: <0.03 NG/ML
WBC # BLD AUTO: 13.8 THOUSAND/UL (ref 4.8–10.8)

## 2021-07-15 PROCEDURE — 93005 ELECTROCARDIOGRAM TRACING: CPT

## 2021-07-15 PROCEDURE — 85730 THROMBOPLASTIN TIME PARTIAL: CPT | Performed by: EMERGENCY MEDICINE

## 2021-07-15 PROCEDURE — 80053 COMPREHEN METABOLIC PANEL: CPT | Performed by: EMERGENCY MEDICINE

## 2021-07-15 PROCEDURE — 84484 ASSAY OF TROPONIN QUANT: CPT | Performed by: EMERGENCY MEDICINE

## 2021-07-15 PROCEDURE — 85025 COMPLETE CBC W/AUTO DIFF WBC: CPT | Performed by: EMERGENCY MEDICINE

## 2021-07-15 PROCEDURE — 99291 CRITICAL CARE FIRST HOUR: CPT | Performed by: EMERGENCY MEDICINE

## 2021-07-15 PROCEDURE — 83880 ASSAY OF NATRIURETIC PEPTIDE: CPT | Performed by: EMERGENCY MEDICINE

## 2021-07-15 PROCEDURE — 85379 FIBRIN DEGRADATION QUANT: CPT | Performed by: EMERGENCY MEDICINE

## 2021-07-15 PROCEDURE — 36415 COLL VENOUS BLD VENIPUNCTURE: CPT | Performed by: EMERGENCY MEDICINE

## 2021-07-15 PROCEDURE — 99285 EMERGENCY DEPT VISIT HI MDM: CPT

## 2021-07-15 PROCEDURE — 85610 PROTHROMBIN TIME: CPT | Performed by: EMERGENCY MEDICINE

## 2021-07-16 ENCOUNTER — APPOINTMENT (INPATIENT)
Dept: RADIOLOGY | Facility: HOSPITAL | Age: 51
DRG: 175 | End: 2021-07-16
Payer: MEDICARE

## 2021-07-16 ENCOUNTER — APPOINTMENT (EMERGENCY)
Dept: CT IMAGING | Facility: HOSPITAL | Age: 51
DRG: 175 | End: 2021-07-16
Payer: MEDICARE

## 2021-07-16 ENCOUNTER — HOSPITAL ENCOUNTER (INPATIENT)
Facility: HOSPITAL | Age: 51
LOS: 1 days | Discharge: HOME | DRG: 175 | End: 2021-07-17
Attending: INTERNAL MEDICINE | Admitting: INTERNAL MEDICINE
Payer: MEDICARE

## 2021-07-16 ENCOUNTER — APPOINTMENT (INPATIENT)
Dept: NON INVASIVE DIAGNOSTICS | Facility: HOSPITAL | Age: 51
DRG: 175 | End: 2021-07-16
Payer: MEDICARE

## 2021-07-16 VITALS
BODY MASS INDEX: 33.13 KG/M2 | DIASTOLIC BLOOD PRESSURE: 83 MMHG | HEIGHT: 73 IN | SYSTOLIC BLOOD PRESSURE: 131 MMHG | HEART RATE: 90 BPM | OXYGEN SATURATION: 98 % | WEIGHT: 250 LBS | TEMPERATURE: 97.1 F | RESPIRATION RATE: 21 BRPM

## 2021-07-16 DIAGNOSIS — I26.92 ACUTE SADDLE PULMONARY EMBOLISM WITHOUT ACUTE COR PULMONALE (HCC): Primary | ICD-10-CM

## 2021-07-16 PROBLEM — Z91.14 NONCOMPLIANCE WITH MEDICATION REGIMEN: Status: ACTIVE | Noted: 2021-07-16

## 2021-07-16 PROBLEM — I50.23 ACUTE ON CHRONIC SYSTOLIC (CONGESTIVE) HEART FAILURE (HCC): Status: ACTIVE | Noted: 2017-06-30

## 2021-07-16 PROBLEM — J96.00 ACUTE RESPIRATORY FAILURE (HCC): Status: ACTIVE | Noted: 2021-07-16

## 2021-07-16 PROBLEM — Z91.148 NONCOMPLIANCE WITH MEDICATION REGIMEN: Status: ACTIVE | Noted: 2021-07-16

## 2021-07-16 LAB
ATRIAL RATE: 104 BPM
P AXIS: 75 DEGREES
PR INTERVAL: 96 MS
QRS AXIS: -67 DEGREES
QRSD INTERVAL: 160 MS
QT INTERVAL: 426 MS
QTC INTERVAL: 563 MS
T WAVE AXIS: 94 DEGREES
VENTRICULAR RATE: 105 BPM

## 2021-07-16 PROCEDURE — 99285 EMERGENCY DEPT VISIT HI MDM: CPT | Performed by: INTERNAL MEDICINE

## 2021-07-16 PROCEDURE — 93010 ELECTROCARDIOGRAM REPORT: CPT | Performed by: INTERNAL MEDICINE

## 2021-07-16 PROCEDURE — 93970 EXTREMITY STUDY: CPT

## 2021-07-16 PROCEDURE — 93306 TTE W/DOPPLER COMPLETE: CPT

## 2021-07-16 PROCEDURE — 96365 THER/PROPH/DIAG IV INF INIT: CPT

## 2021-07-16 PROCEDURE — 99285 EMERGENCY DEPT VISIT HI MDM: CPT

## 2021-07-16 PROCEDURE — 93970 EXTREMITY STUDY: CPT | Performed by: SURGERY

## 2021-07-16 PROCEDURE — 71275 CT ANGIOGRAPHY CHEST: CPT

## 2021-07-16 PROCEDURE — 99223 1ST HOSP IP/OBS HIGH 75: CPT | Performed by: INTERNAL MEDICINE

## 2021-07-16 PROCEDURE — 93306 TTE W/DOPPLER COMPLETE: CPT | Performed by: INTERNAL MEDICINE

## 2021-07-16 PROCEDURE — 96375 TX/PRO/DX INJ NEW DRUG ADDON: CPT

## 2021-07-16 PROCEDURE — RECHECK: Performed by: NURSE PRACTITIONER

## 2021-07-16 PROCEDURE — G1004 CDSM NDSC: HCPCS

## 2021-07-16 PROCEDURE — NC001 PR NO CHARGE: Performed by: INTERNAL MEDICINE

## 2021-07-16 PROCEDURE — 73590 X-RAY EXAM OF LOWER LEG: CPT

## 2021-07-16 RX ORDER — NITROGLYCERIN 0.4 MG/1
0.4 TABLET SUBLINGUAL
Status: DISCONTINUED | OUTPATIENT
Start: 2021-07-16 | End: 2021-07-17 | Stop reason: HOSPADM

## 2021-07-16 RX ORDER — QUETIAPINE FUMARATE 25 MG/1
25 TABLET, FILM COATED ORAL
Status: DISCONTINUED | OUTPATIENT
Start: 2021-07-16 | End: 2021-07-17 | Stop reason: HOSPADM

## 2021-07-16 RX ORDER — ASPIRIN 81 MG/1
81 TABLET, CHEWABLE ORAL DAILY
Status: DISCONTINUED | OUTPATIENT
Start: 2021-07-16 | End: 2021-07-17 | Stop reason: HOSPADM

## 2021-07-16 RX ORDER — ALBUTEROL SULFATE 90 UG/1
2 AEROSOL, METERED RESPIRATORY (INHALATION) EVERY 6 HOURS PRN
Status: DISCONTINUED | OUTPATIENT
Start: 2021-07-16 | End: 2021-07-17 | Stop reason: HOSPADM

## 2021-07-16 RX ORDER — NICOTINE 21 MG/24HR
1 PATCH, TRANSDERMAL 24 HOURS TRANSDERMAL DAILY
Status: DISCONTINUED | OUTPATIENT
Start: 2021-07-16 | End: 2021-07-17 | Stop reason: HOSPADM

## 2021-07-16 RX ORDER — ONDANSETRON 2 MG/ML
4 INJECTION INTRAMUSCULAR; INTRAVENOUS EVERY 6 HOURS PRN
Status: DISCONTINUED | OUTPATIENT
Start: 2021-07-16 | End: 2021-07-17 | Stop reason: HOSPADM

## 2021-07-16 RX ORDER — HEPARIN SODIUM 1000 [USP'U]/ML
8800 INJECTION, SOLUTION INTRAVENOUS; SUBCUTANEOUS ONCE
Status: COMPLETED | OUTPATIENT
Start: 2021-07-16 | End: 2021-07-16

## 2021-07-16 RX ORDER — HEPARIN SODIUM 1000 [USP'U]/ML
8800 INJECTION, SOLUTION INTRAVENOUS; SUBCUTANEOUS
Status: DISCONTINUED | OUTPATIENT
Start: 2021-07-16 | End: 2021-07-16 | Stop reason: HOSPADM

## 2021-07-16 RX ORDER — LEVOTHYROXINE SODIUM 0.05 MG/1
50 TABLET ORAL
Status: DISCONTINUED | OUTPATIENT
Start: 2021-07-16 | End: 2021-07-17 | Stop reason: HOSPADM

## 2021-07-16 RX ORDER — HEPARIN SODIUM 10000 [USP'U]/100ML
3-30 INJECTION, SOLUTION INTRAVENOUS
Status: DISCONTINUED | OUTPATIENT
Start: 2021-07-16 | End: 2021-07-16 | Stop reason: HOSPADM

## 2021-07-16 RX ORDER — METOPROLOL SUCCINATE 100 MG/1
100 TABLET, EXTENDED RELEASE ORAL 2 TIMES DAILY
Status: DISCONTINUED | OUTPATIENT
Start: 2021-07-16 | End: 2021-07-17 | Stop reason: HOSPADM

## 2021-07-16 RX ORDER — LAMOTRIGINE 100 MG/1
100 TABLET ORAL 2 TIMES DAILY
Status: DISCONTINUED | OUTPATIENT
Start: 2021-07-16 | End: 2021-07-17 | Stop reason: HOSPADM

## 2021-07-16 RX ORDER — FUROSEMIDE 80 MG
80 TABLET ORAL
Status: DISCONTINUED | OUTPATIENT
Start: 2021-07-16 | End: 2021-07-16

## 2021-07-16 RX ORDER — FUROSEMIDE 10 MG/ML
60 INJECTION INTRAMUSCULAR; INTRAVENOUS ONCE
Status: COMPLETED | OUTPATIENT
Start: 2021-07-16 | End: 2021-07-16

## 2021-07-16 RX ORDER — CLONAZEPAM 1 MG/1
1 TABLET ORAL EVERY 12 HOURS SCHEDULED
Status: DISCONTINUED | OUTPATIENT
Start: 2021-07-16 | End: 2021-07-17 | Stop reason: HOSPADM

## 2021-07-16 RX ORDER — FUROSEMIDE 80 MG
80 TABLET ORAL
Status: DISCONTINUED | OUTPATIENT
Start: 2021-07-17 | End: 2021-07-17 | Stop reason: HOSPADM

## 2021-07-16 RX ORDER — HEPARIN SODIUM 1000 [USP'U]/ML
4400 INJECTION, SOLUTION INTRAVENOUS; SUBCUTANEOUS
Status: DISCONTINUED | OUTPATIENT
Start: 2021-07-16 | End: 2021-07-16 | Stop reason: HOSPADM

## 2021-07-16 RX ADMIN — CLONAZEPAM 1 MG: 1 TABLET ORAL at 08:26

## 2021-07-16 RX ADMIN — LAMOTRIGINE 100 MG: 100 TABLET ORAL at 17:01

## 2021-07-16 RX ADMIN — ENOXAPARIN SODIUM 105 MG: 120 INJECTION SUBCUTANEOUS at 22:04

## 2021-07-16 RX ADMIN — QUETIAPINE FUMARATE 25 MG: 25 TABLET ORAL at 22:02

## 2021-07-16 RX ADMIN — NICOTINE 1 PATCH: 21 PATCH, EXTENDED RELEASE TRANSDERMAL at 08:29

## 2021-07-16 RX ADMIN — HEPARIN SODIUM 18 UNITS/KG/HR: 10000 INJECTION, SOLUTION INTRAVENOUS at 01:45

## 2021-07-16 RX ADMIN — HEPARIN SODIUM 8800 UNITS: 1000 INJECTION, SOLUTION INTRAVENOUS; SUBCUTANEOUS at 01:45

## 2021-07-16 RX ADMIN — ENOXAPARIN SODIUM 105 MG: 120 INJECTION SUBCUTANEOUS at 08:28

## 2021-07-16 RX ADMIN — LEVOTHYROXINE SODIUM 50 MCG: 50 TABLET ORAL at 08:26

## 2021-07-16 RX ADMIN — METOPROLOL SUCCINATE 100 MG: 100 TABLET, EXTENDED RELEASE ORAL at 08:27

## 2021-07-16 RX ADMIN — ASPIRIN 81 MG CHEWABLE TABLET 81 MG: 81 TABLET CHEWABLE at 08:26

## 2021-07-16 RX ADMIN — SACUBITRIL AND VALSARTAN 1 TABLET: 49; 51 TABLET, FILM COATED ORAL at 08:26

## 2021-07-16 RX ADMIN — SACUBITRIL AND VALSARTAN 1 TABLET: 49; 51 TABLET, FILM COATED ORAL at 17:01

## 2021-07-16 RX ADMIN — METOPROLOL SUCCINATE 100 MG: 100 TABLET, EXTENDED RELEASE ORAL at 22:02

## 2021-07-16 RX ADMIN — IOHEXOL 100 ML: 350 INJECTION, SOLUTION INTRAVENOUS at 01:12

## 2021-07-16 RX ADMIN — CLONAZEPAM 1 MG: 1 TABLET ORAL at 22:02

## 2021-07-16 RX ADMIN — FUROSEMIDE 60 MG: 10 INJECTION, SOLUTION INTRAMUSCULAR; INTRAVENOUS at 09:58

## 2021-07-16 RX ADMIN — LAMOTRIGINE 100 MG: 100 TABLET ORAL at 08:27

## 2021-07-16 NOTE — ASSESSMENT & PLAN NOTE
· Two week history of left leg swelling and pain, with associated difficulty with ambulation  · [facility] ED very elevated, subsequent CTA PE study with acute saddle pulmonary embolus without radiographic evidence of right heart strain, transferred to this [place] for IR availability  · Seen by ICU team in ED, felt appropriate for step-down level 2 care given minimal supplemental oxygen requirement and hemodynamics stability  · Was on heparin in ED, will transition to weight based Lovenox 1mg/kg b i d    · Telemetry monitoring  · Check echocardiogram, lower extremity duplexes  · Pulmonology consult  · Cm consult to price check [facility]

## 2021-07-16 NOTE — CASE MANAGEMENT
Cm received consult to price check [id number] that was sent to [facility]  Cost will be $4 and pt qualifies for free 30 days

## 2021-07-16 NOTE — H&P
Aurelia 56 1970, 44 y o  male MRN: [id number]  Unit/Bed#: ED 08 Encounter: [id number]  Primary Care Provider: Jakob Paul DO   Date and time admitted to hospital: 7/16/2021  4:04 AM    * Acute saddle pulmonary embolism without acute cor pulmonale (HCC)  Assessment & Plan  · Two week history of left leg swelling and pain, with associated difficulty with ambulation  · D-dimer at [facility] ED very elevated, subsequent CTA PE study with acute saddle pulmonary embolus without radiographic evidence of right heart strain, transferred to this [place] for IR availability  · Seen by ICU team in ED, felt appropriate for step-down level 2 care given minimal supplemental oxygen requirement and hemodynamics stability  · Was on heparin in ED, will transition to weight based Lovenox 1mg/kg b i d    · Telemetry monitoring  · Check echocardiogram, lower extremity duplexes  · Pulmonology consult  · Cm consult to price check NOAC    Dilated cardiomyopathy ([facility] [address] )  Assessment & Plan  · Follows with Dr Joan Savage of heart failure office  · Appears euvolemic to very slightly volume overload; patient admits to intermittent medication noncompliance  · Continue with oral Lasix, Entresto, metoprolol succinate  · Monitor daily weights, I/O, volume status    COPD (chronic obstructive pulmonary disease) (HCC)  Assessment & Plan  · Not in acute exacerbation, follows [facility] pulmonology Dr Agueda Oliva  · Continue p r n  albuterol    Noncompliance with medication regimen  Assessment & Plan  · Patient admits to only intermittent compliance with medications, states he has good days and bad days  · Counseled on need for strict cessation with all medications, particularly as patient will be discharged on anticoagulation    Anxiety and depression  Assessment & Plan  · Mood stable, continue Seroquel, Lamictal, p r n  clonazepam    Tobacco abuse  Assessment & Plan  · Continues to smoke 1 pack per day  · Counseled on cessation  · Nicotine patch to be provided      VTE Prophylaxis: Heparin Drip  / sequential compression device   Code Status: Level 1 - Full Code  POLST: POLST form is not discussed and not completed at this time  Discussion with family:  Offered however patient declines at this time    Anticipated Length of Stay:  Patient will be admitted on an Inpatient basis with an anticipated length of stay of  greater than 2 midnights  Justification for Hospital Stay: Please see detailed plans noted above  Chief Complaint:     Left leg swelling prompting presentation [facility], found with saddle pulmonary embolism and transferred for higher level of care/IR availability  History of Present Illness:  Adilia Womack is a 44 y o  male who initially presented to the [facility]) [address] [place] [address] ED with left leg swelling and pain  Has a past medical history significant for chronic systolic heart failure EF 30% s/p Bi V ICD, dilated nonischemic cardiomyopathy, COPD, MARRY not on CPAP, nicotine dependence currently smoking 1 pack cigarettes per day, history of alcohol abuse, anxiety/depression, and admits to medication noncompliance  Reports leg swelling has been present and progressive over the past 2 5 weeks associated with fall in bathroom, and associated with pain and dysfunction secondary to the pain  Denied fevers/chills, chest pain/pressure, shortness of breath, dizziness/lightheadedness, headache, or numbness/tingling/paresthesias  This evening apparently his girlfriend expressed concern, and strongly urged him to present for evaluation  During ED evaluation a D-dimer was obtained and found to be significantly elevated, and subsequent CTA PE study (lower extremity duplex was not available at that ED evaluation) revealed acute saddle pulmonary embolus  He was started on heparin drip, and transferred to this [place] for IR availability in the event patient should decompensate      He was seen on arrival by Critical Care Medicine team, noted that patient was on minimal supplemental oxygen, comfortable appearing in no respiratory distress, and without markers concerning for right heart dysfunction, thus is to be admitted under slim as step-down level 2  Currently, patient is lying in bed in no acute distress and comfortable appearing and able to answer questions appropriately  Reports some residual pain in the left lower extremity but somewhat better controlled  Continues to deny chest pain/pressure, shortness of breath, or dizziness/lightheadedness  Review of Systems:    Constitutional:  Denies fever or chills   Eyes:  Denies change in visual acuity   HENT:  Denies nasal congestion or sore throat   Respiratory:  Denies cough or shortness of breath   Cardiovascular:  Denies chest pain but endorses edema  GI:  Denies abdominal pain, nausea, vomiting, bloody stools or diarrhea   :  Denies dysuria   Musculoskeletal:  Denies back pain or joint pain   Integument:  Denies rash   Neurologic:  Denies headache, focal weakness or sensory changes   Endocrine:  Denies polyuria or polydipsia   Lymphatic:  Denies swollen glands   Psychiatric:  Denies depression or anxiety     Past Medical and Surgical History:   Past Medical History:   Diagnosis Date    Anxiety and depression     CHF (congestive heart failure) ([facility] )     COPD (chronic obstructive pulmonary disease) ([facility] )     Dilated cardiomyopathy ([facility] )     Hypertension     MARRY (obstructive sleep apnea)      Past Surgical History:   Procedure Laterality Date    BRONCHOSCOPY  03/10/2017    CARDIAC PACEMAKER PLACEMENT  10/04/2017       Meds/Allergies:  (Not in a hospital admission)      Allergies:    Allergies   Allergen Reactions    Chantix [Varenicline] Other (See Comments)     Depression     History:  Marital Status: Single     Substance Use History:   Social History     Substance and Sexual Activity   Alcohol Use Yes    Comment: Socially, several times per week     Social History     Tobacco Use   Smoking Status Current Every Day Smoker    Packs/day: 1 50    Years: 40 00    Pack years: 60 00    Types: Cigarettes   Smokeless Tobacco Former User   Tobacco Comment    currently, 1ppd or less     Social History     Substance and Sexual Activity   Drug Use No       Family History:  Family History   Problem Relation Age of Onset    Diabetes Mother     Emphysema Mother     No Known Problems Father     Emphysema Maternal Aunt        Physical Exam:     Vitals:   Blood Pressure: 148/97 (07/16/21 0415)  Pulse: 92 (07/16/21 0415)  Temperature: 97 5 °F (36 4 °C) (07/16/21 0415)  Temp Source: Oral (07/16/21 0415)  Respirations: (!) 24 (07/16/21 0415)  Height: 6' 1" (185 4 cm) (07/16/21 0415)  Weight - Scale: 113 kg (250 lb) (07/16/21 0415)  SpO2: 96 % (07/16/21 0415)    Constitutional:  Well developed, obese, no acute distress, non-toxic appearance   Eyes:  PERRL, conjunctiva normal   HENT:  Atraumatic, external ears normal, nose normal, oropharynx moist, no pharyngeal exudates  Neck- normal range of motion, no tenderness, supple   Respiratory:  No respiratory distress, diminished breath sounds bilaterally, no rales, no wheezing   Cardiovascular:  Normal rate, normal rhythm, no murmurs, no gallops, no rubs   GI:  Soft, nondistended, normal bowel sounds, nontender, no organomegaly, no mass, no rebound, no guarding   :  No costovertebral angle tenderness   Musculoskeletal:  1+ LE edema to LLE, no tenderness, no deformities  Back- no tenderness  Integument:  Well hydrated, no rash   Lymphatic:  No lymphadenopathy noted   Neurologic:  Alert &awake, communicative, CN 2-12 normal, normal motor function, normal sensory function, no focal deficits noted   Psychiatric:  Speech and behavior appropriate       Lab Results: I have personally reviewed pertinent reports        Results from last 7 days   Lab Units 07/15/21  2317   WBC Thousand/uL 13 80*   HEMOGLOBIN g/dL 16 7   HEMATOCRIT % 49 6* PLATELETS Thousands/uL 138*   NEUTROS PCT % 71   LYMPHS PCT % 18*   MONOS PCT % 9   EOS PCT % 1     Results from last 7 days   Lab Units 07/15/21  2317   POTASSIUM mmol/L 4 0   CHLORIDE mmol/L 101   CO2 mmol/L 25   BUN mg/dL 14   CREATININE mg/dL 0 94   CALCIUM mg/dL 9 4   ALK PHOS U/L 55   ALT U/L 18   AST U/L 25     Results from last 7 days   Lab Units 07/15/21  2317   INR  1 03       EKG:  Atrial sensed ventricular paced rhythm     Imaging: I have personally reviewed pertinent reports  CTA ED chest PE Study    Result Date: 7/16/2021  Narrative: CTA - CHEST WITH IV CONTRAST - PULMONARY ANGIOGRAM INDICATION:   PE suspected, intermediate prob, positive D-dimer TACHCYARDIA, LEG SWELLING, ELEVATED D DIMER  COMPARISON: 5/5/2021  TECHNIQUE: CTA examination of the chest was performed using angiographic technique according to a protocol specifically tailored to evaluate for pulmonary embolism  Axial, sagittal, and coronal 2D reformatted images were created from the source data and  submitted for interpretation  In addition, coronal 3D MIP postprocessing was performed on the acquisition scanner  Radiation dose length product (DLP) for this visit:  1116 6 mGy-cm   This examination, like all CT scans performed in the [facility], was performed utilizing techniques to minimize radiation dose exposure, including the use of iterative  reconstruction and automated exposure control  IV Contrast:  100 mL of iohexol (OMNIPAQUE)  FINDINGS: PULMONARY ARTERIAL TREE:  Extensive pulmonary embolism is seen including a saddle embolus extending into the upper, middle and lower lobes bilaterally  LUNGS: Lungs are hyperinflated and there is mild diffuse bronchial wall thickening concerning for COPD possibly with superimposed degree of bronchial inflammation  Right middle lobe and lingular bronchiectasis and nodular changes suggests an atypical infection most commonly CODEY/MAC  PLEURA:  Unremarkable  HEART/GREAT VESSELS:  Unremarkable for patient's age  No CT evidence for right heart strain however dedicated echocardiography is recommended for further evaluation, especially considering the clot burden which is extensive  MEDIASTINUM AND WEN:  Unremarkable  CHEST WALL AND LOWER NECK:   Left anterior chest wall AICD is noted  VISUALIZED STRUCTURES IN THE UPPER ABDOMEN:  Unremarkable  OSSEOUS STRUCTURES:  No acute fracture or destructive osseous lesion  Impression: Extensive pulmonary emboli including saddle emboli and clot extending throughout the lungs  No CT evidence for right heart strain however dedicated echocardiography is recommended for further evaluation, especially considering the extensive an severe clot burden  Lungs are hyperinflated and there is mild diffuse bronchial wall thickening concerning for COPD possibly with superimposed degree of bronchial inflammation  Right middle lobe and lingular bronchiectasis and nodular changes suggests an atypical infection most commonly CODEY/MAC  Recommend imaging follow-up to resolution  I personally discussed this study with Tyrone Paul on 7/16/2021 at 1:40 AM  Workstation performed: [id number]         ** Please Note: Dragon 360 Dictation voice to text software was used in the creation of this document   **

## 2021-07-16 NOTE — ED PROCEDURE NOTE
PROCEDURE  CriticalCare Time  Performed by: Joe Hernandez MD  Authorized by: Joe Hernandez MD     Critical care provider statement:     Critical care time (minutes):  65    Critical care start time:  7/15/2021 11:30 PM    Critical care end time:  7/16/2021 2:27 AM    Critical care time was exclusive of:  Separately billable procedures and treating other patients    Critical care was necessary to treat or prevent imminent or life-threatening deterioration of the following conditions: serial exams, tx of saddle pe, dw critical care     Critical care was time spent personally by me on the following activities:  Ordering and review of radiographic studies, ordering and review of laboratory studies, ordering and performing treatments and interventions, examination of patient, evaluation of patient's response to treatment, discussions with primary provider, discussions with consultants, development of treatment plan with patient or surrogate and obtaining history from patient or surrogate         Joe Hernandez MD  07/16/21 5367

## 2021-07-16 NOTE — CONSULTS
PULMONOLOGY CONSULT NOTE     Name: Adilia Womack   Age & Sex: 44 y o  male   MRN: [id number]  Unit/Bed#: ED 08   Encounter: [id number]        Reason for consultation: saddle PE     Requesting physician: Dr Aspen Tapia and Plan:    Acute hypoxic respiratory failure secondary to acute pulmonary embolism - unclear if provoked   - on therapeutic Lovenox 1mg/kg BID   - pending 2D echo   - LE duplex with acute non-occlusive DVT in the gastrocnemius, popliteal, and distal femoral veins, acute occlusive superficial thrombophlebitis    - CTA on 7/16 shows extensive emboli saddle and extending throughout b/l  Also evidence of bronchial wall thickening with RML and lingular bronchiectasis and nodular changes   - wean oxygen for goal 90-92%   - , troponin negative   - d-dimer 9 74  - can likely switch to NOAC on 7/17- await 2D echo   - provoking risk factors: tobacco abuse, obesity, and prolonged sedentary periods   - will need at least 6 months of AC and discuss risks vs benefits for lifelong or checking thrombosis panel     Moderately severe COPD - not in acute exacerbation   - follow with Dr Griselda Linda   - on Albuterol prn  And Spiriva prn as per patient   - FEV1 53%      MARRY   - intolerant to CPAP     Tobacco Abuse   - on NRT   - continues to smoke     Chronic HFrEF s/p BiV ICD  Dilated NICMP       History of Present Illness   HPI:  Adilia Womack is a 44 y o  male with PMHx chronic systolic heart failure EF 30% s/p Bi V ICD, dilated nonischemic cardiomyopathy, COPD, MARRY not on CPAP, nicotine dependence currently smoking 1 pack cigarettes per day, history of alcohol abuse, anxiety/depression, and admits to medication noncompliance  initially presented to the [facility] ED with left leg swelling and pain  Reports leg swelling has been present and progressive over the past 2 5 weeks associated with fall in bathroom, and associated with pain and dysfunction secondary to the pain    Denied fevers/chills, chest pain/pressure, shortness of breath, dizziness/lightheadedness, headache, or numbness/tingling/paresthesias  Yesterday evening apparently his girlfriend expressed concern, and strongly urged him to present for evaluation  During ED evaluation a D-dimer was obtained and found to be significantly elevated, and subsequent CTA PE study (lower extremity duplex was not available at that ED evaluation) revealed acute saddle pulmonary embolus  He was started on heparin drip, and transferred to this [place] for IR availability in the event patient should decompensate      He was seen on arrival by Critical Care Medicine team, noted that patient was on minimal supplemental oxygen, comfortable appearing in no respiratory distress, and without markers concerning for right heart dysfunction, thus is to be admitted under slim as step-down level 2  Review of systems:  12 point review of systems was completed and was otherwise negative except as listed in HPI        Historical Information   Past Medical History:   Diagnosis Date    Anxiety and depression     CHF (congestive heart failure) (HCC)     COPD (chronic obstructive pulmonary disease) (HCC)     Dilated cardiomyopathy (HCC)     Hypertension     MARRY (obstructive sleep apnea)      Past Surgical History:   Procedure Laterality Date    BRONCHOSCOPY  03/10/2017    CARDIAC PACEMAKER PLACEMENT  10/04/2017     Family History   Problem Relation Age of Onset    Diabetes Mother     Emphysema Mother     No Known Problems Father     Emphysema Maternal Aunt        Occupational History: noncontributory     Social History: current smoker 1 5ppd    Social History     Tobacco Use   Smoking Status Current Every Day Smoker    Packs/day: 1 50    Years: 40 00    Pack years: 60 00    Types: Cigarettes   Smokeless Tobacco Former User   Tobacco Comment    currently, 1ppd or less         Meds/Allergies   Current Facility-Administered Medications   Medication Dose Route Frequency  albuterol (PROVENTIL HFA,VENTOLIN HFA) inhaler 2 puff  2 puff Inhalation Q6H PRN    aspirin chewable tablet 81 mg  81 mg Oral Daily    clonazePAM (KlonoPIN) tablet 1 mg  1 mg Oral Q12H [facility]    enoxaparin (LOVENOX) subcutaneous injection 105 mg  1 mg/kg Subcutaneous Q12H    furosemide (LASIX) tablet 80 mg  80 mg Oral BID (diuretic)    lamoTRIgine (LaMICtal) tablet 100 mg  100 mg Oral BID    levothyroxine tablet 50 mcg  50 mcg Oral Early Morning    metoprolol succinate (TOPROL-XL) 24 hr tablet 100 mg  100 mg Oral BID    nicotine (NICODERM CQ) 21 mg/24 hr TD 24 hr patch 1 patch  1 patch Transdermal Daily    nitroglycerin (NITROSTAT) SL tablet 0 4 mg  0 4 mg Sublingual Q5 Min PRN    ondansetron (ZOFRAN) injection 4 mg  4 mg Intravenous Q6H PRN    QUEtiapine (SEROquel) tablet 25 mg  25 mg Oral HS    sacubitril-valsartan (ENTRESTO) 49-51 MG per tablet 1 tablet  1 tablet Oral BID     (Not in a hospital admission)    Allergies   Allergen Reactions    Chantix [Varenicline] Other (See Comments)     Depression       Vitals: Blood pressure 132/81, pulse 98, temperature 97 5 °F (36 4 °C), temperature source Oral, resp  rate (!) 24, height 6' 1" (1 854 m), weight 113 kg (250 lb), SpO2 94 %  , Body mass index is 32 98 kg/m²  No intake or output data in the 24 hours ending 07/16/21 0716    Physical Exam  Vitals reviewed  Constitutional:       Appearance: Normal appearance  He is obese  HENT:      Head: Normocephalic  Nose: Nose normal       Mouth/Throat:      Mouth: Mucous membranes are moist    Eyes:      Pupils: Pupils are equal, round, and reactive to light  Cardiovascular:      Rate and Rhythm: Normal rate  Rhythm irregular  Pulses: Normal pulses  Heart sounds: Normal heart sounds  Pulmonary:      Effort: Pulmonary effort is normal       Breath sounds: Normal breath sounds  Abdominal:      General: Abdomen is flat  Palpations: Abdomen is soft     Musculoskeletal:      Right lower leg: Edema present  Left lower leg: Edema present  Skin:     General: Skin is warm  Neurological:      General: No focal deficit present  Mental Status: He is alert and oriented to person, place, and time  Labs: I have personally reviewed pertinent lab results  Laboratory and Diagnostics  Results from last 7 days   Lab Units 07/15/21  2317   WBC Thousand/uL 13 80*   HEMOGLOBIN g/dL 16 7   HEMATOCRIT % 49 6*   PLATELETS Thousands/uL 138*   NEUTROS PCT % 71   MONOS PCT % 9     Results from last 7 days   Lab Units 07/15/21  2317   SODIUM mmol/L 134   POTASSIUM mmol/L 4 0   CHLORIDE mmol/L 101   CO2 mmol/L 25   ANION GAP mmol/L 8   BUN mg/dL 14   CREATININE mg/dL 0 94   CALCIUM mg/dL 9 4   GLUCOSE RANDOM mg/dL 120*   ALT U/L 18   AST U/L 25   ALK PHOS U/L 55   ALBUMIN g/dL 3 7   TOTAL BILIRUBIN mg/dL 0 70          Results from last 7 days   Lab Units 07/15/21  2317   INR  1 03   PTT seconds 28      Results from last 7 days   Lab Units 07/15/21  2317   TROPONIN I ng/mL <0 03                     Results from last 7 days   Lab Units 07/15/21  2317   D-DIMER QUANTITATIVE ug/ml FEU 9 74*             Imaging and other studies: I have personally reviewed pertinent reports  and I have personally reviewed pertinent films in PACS    Pulmonary function testing: 10/2020 FEV1/FVC 65%   FEV1 2 25 L, 53% predicted   FVC 3 47 L, 63% predicted     Duplex: There is an echogenic structure located in the left inguinal region  This  structure measures approximately 2 0 cm and is consistent with enlarged lymph  node and channels  EKG, Pathology, and Other Studies: I have personally reviewed pertinent reports     and I have personally reviewed pertinent films in PACS      Code Status: Level 1 - Full Code    VTE Pharmacologic Prophylaxis: Enoxaparin (Lovenox)  VTE Mechanical Prophylaxis: r/o DVT    Savanna Enriquez MD  Pulmonary/Critical Care Fellow  [facility]

## 2021-07-16 NOTE — ASSESSMENT & PLAN NOTE
· Follows with Dr Zeyad Rajan of heart failure office  · Examines volume overloaded and with subjective SOB/orthopnea, will give 1 dose intravenous Lasix and monitor response    · Otherwise continue Entresto, metoprolol succinate  · Monitor daily weights, I/O, volume status

## 2021-07-16 NOTE — PROGRESS NOTES
[facility]  Progress Note - Florence Manners 1970, 44 y o  male MRN: [id number]  Unit/Bed#: ED 08 Encounter: [id number]  Primary Care Provider: Surekha Clement DO   Date and time admitted to hospital: 7/16/2021  4:04 AM    * Acute saddle pulmonary embolism without acute cor pulmonale (HCC)  Assessment & Plan  · Two week history of left leg swelling and pain, with associated difficulty with ambulation  · D-dimer at [facility]) [address] [place] [address] ED very elevated, subsequent CTA PE study with acute saddle pulmonary embolus without radiographic Evaluated by ICU team in ED, felt appropriate for step-down level 2 care given minimal supplemental oxygen requirement and hemodynamics stability  · Continue therapeutic Lovenox  · Telemetry monitoring  · echocardiogram, lower extremity duplex pending, Anticipate he does have LE DVT  · Pulmonology consult  · Cm consult to price check NOAC    [facility] ([facility]  Assessment & Plan  · In the setting of acute CHF and saddle PE  Currently requiring 2-4 L  · intravenous Lasix today and monitor response  · Wean oxygen as tolerated     Acute on chronic systolic (congestive) heart failure (HCC)  Assessment & Plan  · Follows with Dr Meghana Nelson of heart failure office  · Examines volume overloaded and with subjective SOB/orthopnea, will give 1 dose intravenous Lasix and monitor response  · Otherwise continue Entresto, metoprolol succinate  · Monitor daily weights, I/O, volume status    Noncompliance with medication regimen  Assessment & Plan  · Patient admits to only intermittent compliance with medications, states he has good days and bad days  · Counseled on need for strict cessation with all medications, particularly as patient will be discharged on anticoagulation    COPD (chronic obstructive pulmonary disease) ([facility] )  Assessment & Plan  · Not in acute exacerbation, follows with [facility] pulmonology Dr Scottie Heller  · Continue p r n  albuterol    Tobacco abuse  Assessment & Plan  · Continues to smoke 1 pack per day  · Counseled on cessation  · Nicotine patch to be provided    Anxiety and depression  Assessment & Plan  · Mood stable, continue Seroquel, Lamictal, p r n  clonazepam    MARRY (obstructive sleep apnea)  Assessment & Plan  · Refuses CPAP      VTE Pharmacologic Prophylaxis: VTE Score: 5 Moderate Risk (Score 3-4) - Pharmacological DVT Prophylaxis Ordered: enoxaparin (Lovenox)  Patient Centered Rounds: I performed bedside rounds with nursing staff today  Discussions with Specialists or Other Care Team Provider: nursing, case management     Education and Discussions with Family / Patient: Patient declined call to   Time Spent for Care: 30 minutes  More than 50% of total time spent on counseling and coordination of care as described above  Current Length of Stay: 0 day(s)  Current Patient Status: Inpatient   Certification Statement: The patient will continue to require additional inpatient hospital stay due to echo, venous duplex, respiratory monitoring, pulmonology consult  Discharge Plan: Anticipate discharge in 48-72 hrs to home  Code Status: Level 1 - Full Code    Subjective:   Patient continues to complain of shortness of breath, worse with movement and orthopnea  No chest pain  Has had worsening lower extremity edema worse on the left over the past 2 weeks  No recent surgeries, did have a mechanical fall approximately 2 weeks ago at home without reported injury but since then has noted that left lower extremity has been progressively more swollen  Objective:     Vitals:   Temp (24hrs), Av 2 °F (36 2 °C), Min:96 9 °F (36 1 °C), Max:97 5 °F (36 4 °C)    Temp:  [96 9 °F (36 1 °C)-97 5 °F (36 4 °C)] 97 5 °F (36 4 °C)  HR:  [] 92  Resp:  [20-28] 25  BP: (128-148)/(74-99) 134/74  SpO2:  [94 %-98 %] 96 %  Body mass index is 32 98 kg/m²       Input and Output Summary (last 24 hours):   No intake or output data in the 24 hours ending 07/16/21 0848    Physical Exam:   Physical Exam  Vitals and nursing note reviewed  Constitutional:       Appearance: He is obese  Interventions: Nasal cannula in place  Cardiovascular:      Rate and Rhythm: Normal rate  Heart sounds: No murmur heard  Pulmonary:      Breath sounds: Decreased breath sounds present  Abdominal:      Tenderness: There is no abdominal tenderness  Musculoskeletal:         General: Swelling (Bilateral lower extremities, left greater than right) and tenderness (Left lower extremity) present  Skin:     General: Skin is warm  Neurological:      Mental Status: He is alert and oriented to person, place, and time  Mental status is at baseline     Psychiatric:         Mood and Affect: Mood normal           Additional Data:     Labs:  Results from last 7 days   Lab Units 07/15/21  2317   WBC Thousand/uL 13 80*   HEMOGLOBIN g/dL 16 7   HEMATOCRIT % 49 6*   PLATELETS Thousands/uL 138*   NEUTROS PCT % 71   LYMPHS PCT % 18*   MONOS PCT % 9   EOS PCT % 1     Results from last 7 days   Lab Units 07/15/21  2317   SODIUM mmol/L 134   POTASSIUM mmol/L 4 0   CHLORIDE mmol/L 101   CO2 mmol/L 25   BUN mg/dL 14   CREATININE mg/dL 0 94   ANION GAP mmol/L 8   CALCIUM mg/dL 9 4   ALBUMIN g/dL 3 7   TOTAL BILIRUBIN mg/dL 0 70   ALK PHOS U/L 55   ALT U/L 18   AST U/L 25   GLUCOSE RANDOM mg/dL 120*     Results from last 7 days   Lab Units 07/15/21  2317   INR  1 03                   Lines/Drains:  Invasive Devices     Peripheral Intravenous Line            Peripheral IV 07/15/21 Left Wrist <1 day                  Telemetry:    Telemetry Reviewed: Normal Sinus Rhythm  Indication for Continued Telemetry Use: Arrthymias requiring medical therapy           Imaging: Reviewed radiology reports from this admission including: chest CT scan    Recent Cultures (last 7 days):         Last 24 Hours Medication List:   Current Facility-Administered Medications Medication Dose Route Frequency Provider Last Rate    albuterol  2 puff Inhalation Q6H PRN Norm Nicely, DO      aspirin  81 mg Oral Daily Norm Nicely, DO      clonazePAM  1 mg Oral Q12H [address] Norm Nicely, DO      enoxaparin  1 mg/kg Subcutaneous Q12H Norm Nicely, DO      furosemide  60 mg Intravenous Once SHERRY Alfaro      [START ON 7/17/2021] furosemide  80 mg Oral BID (diuretic) SHERRY Cam      lamoTRIgine  100 mg Oral BID Norm Nicely, DO      levothyroxine  50 mcg Oral Early Morning Norm Nicely, DO      metoprolol succinate  100 mg Oral BID Norm Nicely, DO      nicotine  1 patch Transdermal Daily Norm Nicely, DO      nitroglycerin  0 4 mg Sublingual Q5 Min PRN Norm Nicely, DO      ondansetron  4 mg Intravenous Q6H PRN Norm Nicely, DO      QUEtiapine  25 mg Oral HS Norm Nicely, DO      sacubitril-valsartan  1 tablet Oral BID Norm Nicely, DO          Today, Patient Was Seen By: SHERRY Alfaro    **Please Note: This note may have been constructed using a voice recognition system  **

## 2021-07-16 NOTE — ED NOTES
Dr Yasmine Zafar with CC at bedside to evaluate patient at this time     Mirian Cranker, RN  07/16/21 3502

## 2021-07-16 NOTE — ED NOTES
RN to bedside to medicate pt - US at bedside at this time  Will return to room after bedside exam for med admin       Velasquez Sherman RN  07/16/21 7481

## 2021-07-16 NOTE — ASSESSMENT & PLAN NOTE
· Two week history of left leg swelling and pain, with associated difficulty with ambulation  · D-dimer at [facility] ED very elevated, subsequent CTA PE study with acute saddle pulmonary embolus without radiographic Evaluated by ICU team in ED, felt appropriate for step-down level 2 care given minimal supplemental oxygen requirement and hemodynamics stability  · Continue therapeutic Lovenox  · Telemetry monitoring  · echocardiogram, lower extremity duplex pending, Anticipate he does have LE DVT  · Pulmonology consult  ·  consult to price check NOAC

## 2021-07-16 NOTE — EMTALA/ACUTE CARE TRANSFER
[facility]  [address] [id number] [phone]  Dept: [phone]      EMTALA TRANSFER CONSENT    NAME Phil CERON 1970                              MRN [id number]    I have been informed of my rights regarding examination, treatment, and transfer   by Dr Irving Dunlap MD    Benefits: Specialized equipment and/or services available at the receiving facility (Include comment)________________________    Risks: Potential for delay in receiving treatment, Loss of IV, Potential deterioration of medical condition, Increased discomfort during transfer, Possible worsening of condition or death during transfer      Consent for Transfer:  I acknowledge that my medical condition has been evaluated and explained to me by the emergency department physician or other qualified medical person and/or my attending physician, who has recommended that I be transferred to the service of  Accepting Physician: Dr Trent Choi at [address] Name, [address] : [facility]  The above potential benefits of such transfer, the potential risks associated with such transfer, and the probable risks of not being transferred have been explained to me, and I fully understand them  The doctor has explained that, in my case, the benefits of transfer outweigh the risks  I agree to be transferred  I authorize the performance of emergency medical procedures and treatments upon me in both transit and upon arrival at the receiving facility  Additionally, I authorize the release of any and all medical records to the receiving facility and request they be transported with me, if possible  I understand that the safest mode of transportation during a medical emergency is an ambulance and that the Hospital advocates the use of this mode of transport   Risks of traveling to the receiving facility by car, including absence of medical control, life sustaining equipment, such as oxygen, and medical personnel has been explained to me and I fully understand them  (WALKER CORRECT BOX BELOW)  [  ]  I consent to the stated transfer and to be transported by ambulance/helicopter  [  ]  I consent to the stated transfer, but refuse transportation by ambulance and accept full responsibility for my transportation by car  I understand the risks of non-ambulance transfers and I exonerate the Hospital and its staff from any deterioration in my condition that results from this refusal     X___________________________________________    DATE  21  TIME________  Signature of patient or legally responsible individual signing on patient behalf           RELATIONSHIP TO PATIENT_________________________          Provider Certification    NAME Anika Weeks                                         1970                              MRN [id number]    A medical screening exam was performed on the above named patient  Based on the examination:    Condition Necessitating Transfer The encounter diagnosis was Acute saddle pulmonary embolism ([facility] )      Patient Condition: The patient has been stabilized such that within reasonable medical probability, no material deterioration of the patient condition or the condition of the unborn child(ronak) is likely to result from the transfer    Reason for Transfer: Level of Care needed not available at this facility    Transfer Requirements: Facility Hospitals in [place]   · Space available and qualified personnel available for treatment as acknowledged by    · Agreed to accept transfer and to provide appropriate medical treatment as acknowledged by       Dr Jc Ott  · Appropriate medical records of the examination and treatment of the patient are provided at the time of transfer   [address] _______  · Transfer will be performed by qualified personnel from    and appropriate transfer equipment as required, including the use of necessary and appropriate life support measures  Provider Certification: I have examined the patient and explained the following risks and benefits of being transferred/refusing transfer to the patient/family:  General risk, such as traffic hazards, adverse weather conditions, rough terrain or turbulence, possible failure of equipment (including vehicle or aircraft), or consequences of actions of persons outside the control of the transport personnel, Unanticipated needs of medical equipment and personnel during transport, Risk of worsening condition      Based on these reasonable risks and benefits to the patient and/or the unborn child(ronak), and based upon the information available at the time of the patients examination, I certify that the medical benefits reasonably to be expected from the provision of appropriate medical treatments at another medical facility outweigh the increasing risks, if any, to the individuals medical condition, and in the case of labor to the unborn child, from effecting the transfer      X____________________________________________ DATE 07/16/21        TIME_______      ORIGINAL - SEND TO MEDICAL RECORDS   COPY - SEND WITH PATIENT DURING TRANSFER

## 2021-07-16 NOTE — ASSESSMENT & PLAN NOTE
· In the setting of acute CHF and saddle PE  Currently requiring 2-4 L    · intravenous Lasix today and monitor response  · Wean oxygen as tolerated

## 2021-07-16 NOTE — ED NOTES
Patient attempting to sleep     O2 titrated to 4L NC due to history of MARRY      Zack Vuong RN  07/16/21 9643

## 2021-07-16 NOTE — ASSESSMENT & PLAN NOTE
· Not in acute exacerbation, follows with [facility] pulmonology Dr Kevin Hampton  · Continue p r n  albuterol

## 2021-07-16 NOTE — ASSESSMENT & PLAN NOTE
· Follows with Dr Clemmie Cogan of heart failure office  · Appears euvolemic to very slightly volume overload; patient admits to intermittent medication noncompliance  · Continue with oral Lasix, Entresto, metoprolol succinate  · Monitor daily weights, I/O, volume status

## 2021-07-16 NOTE — QUICK NOTE
Patient sent from outside hospital to [place] ED for critical care evaluation for diagnosis of acute saddle pulmonary embolism  On arrival to the ED, patient was evaluated by myself  Patient's vital signs within normal limits, patient is in no acute distress  Denies any chest pain or difficulty breathing  Patient not hypoxic, blood pressure within normal limits  Patient currently on appropriate heparin drip  At this time, do not believe that patient requires critical care management  Patient was discussed with critical care attending and internal medicine attending  Internal medicine to admit for further management

## 2021-07-16 NOTE — ASSESSMENT & PLAN NOTE
· Patient admits to only intermittent compliance with medications, states he has good days and bad days  · Counseled on need for strict cessation with all medications, particularly as patient will be discharged on anticoagulation

## 2021-07-16 NOTE — QUICK NOTE
Your contact me possible admission of this patient who is demonstrated extensive pulmonary embolus to include saddle embolus extending in the upper, middle and lower lobes bilaterally  Apparently ER physician discussed this with critical care here who felt patient was appropriate for admission at this facility  I spoke with my attending on-call Dr Brandon Jones and voiced my concerns for a 52-year-old gentleman with multiple comorbidities and extensive clot burden remaining overnight at a facility with no interventional radiology services and though patient appears clinically stable at this time if he did decompensate during the night no emergent intervention could be undertaken  He agreed and recommended that we run the by Interventional Radiology  ER physician contacted Interventional Radiology who concurred the patient would be best served by transfer to [place] were emergent interventional radiology services were available if the need were to arise  Decision was made to transfer the patient to [facility] and transfer order was placed

## 2021-07-16 NOTE — PROGRESS NOTES
Asked to see this pleasant 49yo transferred from outside hospital for admission to [facility] for assistance re- disposition  Reviewed chart- spoke with transferring physician  He has extensive bilateral PE as well as saddle PE  His left leg is swollen -so likely has DVT  He has had the symptoms for 2 and half weeks and only came to hospital because his girlfriend forced him to    He's awake, conversant, not in any respiratory distress and extremities are warm  He does have a history of COPD, DNICM and chronic systolic CHF  His vital signs currently are very stable- 148/97, 92, 24, 96% on 2l  He manifests no evidence of right heart dysfunction  His troponin is normal and cBNP is only mildly elevated despite his cardiomyopathy    I think he can be admitted to [facility] under the medicine service  I have asked medicine to start lovenox 1mg/kg every 12hrs  He needs a formal echo as well as a LLE duplex in am  A pulmonary consult will be appropriate also

## 2021-07-16 NOTE — ED NOTES
Report called to PILAR Carlson on P5  Echo at bedside at this time - will transport after imaging study complete  [facility] telemetry verified with receiving unit        Isabel Romero RN  07/16/21 2213

## 2021-07-16 NOTE — ASSESSMENT & PLAN NOTE
· Not in acute exacerbation, follows with [facility] Dr Romy Gayle  · Continue p r n  albuterol

## 2021-07-16 NOTE — ED PROVIDER NOTES
History  Chief Complaint   Patient presents with    Leg Swelling     LLE +3 pitting edema, started approximately two weeks ago s/p slip and fall in bathroom, no other injury from fall, +sensation +movement       45 yo M    PMH:  obestiy  CHF  HTN  Hypothyroid  COPD  Cardiomyopathy    Pt here for 2 weeks of left leg swelling/edema    He has NOT been compliant w/ his medications    He has no CP or sob  No HARO            History provided by:  Patient  Leg Pain  Location:  Leg  Leg location:  L leg  Pain details:     Quality:  Aching    Radiates to:  Does not radiate    Severity:  Moderate    Onset quality:  Gradual  Chronicity:  New  Prior injury to area:  No  Relieved by:  Nothing  Worsened by:  Nothing  Ineffective treatments:  None tried  Associated symptoms: no back pain, no decreased ROM, no fatigue, no fever, no muscle weakness, no neck pain, no numbness, no stiffness, no swelling and no tingling    Risk factors: obesity        Prior to Admission Medications   Prescriptions Last Dose Informant Patient Reported? Taking?    QUEtiapine (SEROquel) 25 mg tablet 7/14/2021 at Unknown time  No Yes   Sig: Take 1 tablet (25 mg total) by mouth daily at bedtime   albuterol (PROVENTIL HFA,VENTOLIN HFA) 90 mcg/act inhaler 7/15/2021 at Unknown time Self No Yes   Sig: Inhale 2 puffs every 6 (six) hours as needed for wheezing or shortness of breath   aspirin 81 mg chewable tablet 7/15/2021 at Unknown time Self No Yes   Sig: Chew 1 tablet (81 mg total) daily   clonazePAM (KlonoPIN) 1 mg tablet 7/15/2021 at Unknown time  No Yes   Sig: Take 1 tablet (1 mg total) by mouth 2 (two) times a day   furosemide (LASIX) 80 mg tablet 7/15/2021 at Unknown time Self No Yes   Sig: Take 1 tablet (80 mg total) by mouth 2 (two) times a day   lamoTRIgine (LaMICtal) 100 mg tablet 7/15/2021 at Unknown time Self No Yes   Sig: Take 1 tablet (100 mg total) by mouth 2 (two) times a day   levothyroxine 50 mcg tablet 7/15/2021 at Unknown time Self No Yes Sig: Take 1 tablet (50 mcg total) by mouth daily   metoprolol succinate (TOPROL-XL) 100 mg 24 hr tablet 7/15/2021 at Unknown time Self No Yes   Sig: Take 1 tablet (100 mg total) by mouth 2 (two) times a day   nitroglycerin (NITROSTAT) 0 4 mg SL tablet More than a month at Unknown time Self No No   Sig: Place 1 tablet (0 4 mg total) under the tongue every 5 (five) minutes as needed for chest pain   potassium chloride (Klor-Con M20) 20 mEq tablet 7/15/2021 at Unknown time Self No Yes   Sig: Take 1 tablet (20 mEq total) by mouth daily   sacubitril-valsartan (Entresto) 49-51 MG TABS 7/15/2021 at Unknown time Self No Yes   Sig: Take 1 tablet by mouth 2 (two) times a day      Facility-Administered Medications: None       Past Medical History:   Diagnosis Date    Anxiety and depression     [facility]     COPD [facility]chronic obstructive pulmonary disease) [facility]     [facility]     Hypertension     MARRY (obstructive sleep apnea)        Past Surgical History:   Procedure Laterality Date    BRONCHOSCOPY  03/10/2017    CARDIAC PACEMAKER PLACEMENT  10/04/2017       Family History   Problem Relation Age of Onset    Diabetes Mother     Emphysema Mother     No Known Problems Father     Emphysema Maternal Aunt      I have reviewed and agree with the history as documented  E-Cigarette/Vaping    E-Cigarette Use Never User      E-Cigarette/Vaping Substances     Social History     Tobacco Use    Smoking status: Current Every Day Smoker     Packs/day: 1 50     Years: 40 00     Pack years: 60 00     Types: Cigarettes    Smokeless tobacco: Former User    Tobacco comment: currently, 1ppd or less   Vaping Use    Vaping Use: Never used   Substance Use Topics    Alcohol use: Yes     Comment: Socially, several times per week    Drug use: No       Review of Systems   Constitutional: Negative for chills, diaphoresis, fatigue and fever     Respiratory: Negative for cough, shortness of breath, wheezing and stridor  Cardiovascular: Positive for leg swelling  Negative for chest pain and palpitations  Gastrointestinal: Negative for abdominal pain, blood in stool, nausea and vomiting  Genitourinary: Negative for difficulty urinating, dysuria, flank pain and frequency  Musculoskeletal: Negative for arthralgias, back pain, gait problem, joint swelling, myalgias, neck pain, neck stiffness and stiffness  Skin: Negative for rash and wound  Neurological: Negative for dizziness, light-headedness and headaches  All other systems reviewed and are negative  Physical Exam  Physical Exam  Constitutional:       General: He is not in acute distress  Appearance: He is well-developed  He is not ill-appearing, toxic-appearing or diaphoretic  HENT:      Head: Normocephalic and atraumatic  Nose: Nose normal       Mouth/Throat:      Pharynx: No oropharyngeal exudate  Eyes:      General: No scleral icterus  Right eye: No discharge  Left eye: No discharge  Conjunctiva/sclera: Conjunctivae normal       Pupils: Pupils are equal, round, and reactive to light  Neck:      Vascular: No JVD  Trachea: No tracheal deviation  Cardiovascular:      Rate and Rhythm: Normal rate and regular rhythm  Heart sounds: Normal heart sounds  No murmur heard  No friction rub  No gallop  Pulmonary:      Effort: Pulmonary effort is normal  No respiratory distress  Breath sounds: Normal breath sounds  No stridor  No wheezing, rhonchi or rales  Chest:      Chest wall: No tenderness  Abdominal:      General: Bowel sounds are normal  There is no distension  Palpations: Abdomen is soft  There is no mass  Tenderness: There is no abdominal tenderness  There is no right CVA tenderness, left CVA tenderness, guarding or rebound  Hernia: No hernia is present  Musculoskeletal:         General: No swelling, tenderness, deformity or signs of injury   Normal range of motion  Cervical back: Normal range of motion and neck supple  No rigidity or tenderness  Right lower leg: No edema  Left lower leg: Edema (2+) present  Lymphadenopathy:      Cervical: No cervical adenopathy  Skin:     General: Skin is warm  Capillary Refill: Capillary refill takes less than 2 seconds  Coloration: Skin is not jaundiced or pale  Findings: No bruising, erythema, lesion or rash  Neurological:      General: No focal deficit present  Mental Status: He is alert and oriented to person, place, and time  Cranial Nerves: No cranial nerve deficit  Sensory: No sensory deficit  Motor: No weakness or abnormal muscle tone  Coordination: Coordination normal    Psychiatric:         Mood and Affect: Mood normal          Behavior: Behavior normal          Thought Content:  Thought content normal          Judgment: Judgment normal          Vital Signs  ED Triage Vitals [07/15/21 2240]   Temperature Pulse Respirations Blood Pressure SpO2   (!) 96 9 °F (36 1 °C) (!) 112 20 128/88 96 %      Temp Source Heart Rate Source Patient Position - Orthostatic VS BP Location FiO2 (%)   Temporal Monitor Sitting Right arm --      Pain Score       7           Vitals:    07/15/21 2240 07/16/21 0120 07/16/21 0254 07/16/21 0300   BP: 128/88 136/99 131/83 131/83   Pulse: (!) 112 95 91 90   Patient Position - Orthostatic VS: Sitting  Sitting          Visual Acuity      ED Medications  Medications   heparin (porcine) 25,000 units in 0 45% NaCl 250 mL infusion (premix) (18 Units/kg/hr × 110 kg (Order-Specific) Intravenous New Bag 7/16/21 0145)   heparin (porcine) injection 8,800 Units (has no administration in time range)   heparin (porcine) injection 4,400 Units (has no administration in time range)   iohexol (OMNIPAQUE) 350 MG/ML injection (SINGLE-DOSE) 100 mL (100 mL Intravenous Given 7/16/21 0112)   heparin (porcine) injection 8,800 Units (8,800 Units Intravenous Given 7/16/21 0145)       Diagnostic Studies  Results Reviewed     Procedure Component Value Units Date/Time    D-Dimer [[id number]]  (Abnormal) Collected: 07/15/21 2317    Lab Status: Final result Specimen: Blood from Arm, Right Updated: 07/15/21 2349     D-Dimer, Quant 9 74 ug/ml FEU     B-Type Natriuretic Peptide [facility] ONLY) [[id number]]  (Abnormal) Collected: 07/15/21 2317    Lab Status: Final result Specimen: Blood from Arm, Right Updated: 07/15/21 2349      pg/mL     Comprehensive metabolic panel [[id number]]  (Abnormal) Collected: 07/15/21 2317    Lab Status: Final result Specimen: Blood from Arm, Right Updated: 07/15/21 2347     Sodium 134 mmol/L      Potassium 4 0 mmol/L      Chloride 101 mmol/L      CO2 25 mmol/L      ANION GAP 8 mmol/L      BUN 14 mg/dL      Creatinine 0 94 mg/dL      Glucose 120 mg/dL      Calcium 9 4 mg/dL      AST 25 U/L      ALT 18 U/L      Alkaline Phosphatase 55 U/L      Total Protein 6 8 g/dL      Albumin 3 7 g/dL      Total Bilirubin 0 70 mg/dL      eGFR 93 ml/min/1 73sq m     Narrative:      [place] guidelines for Chronic Kidney Disease (CKD):     Stage 1 with normal or high GFR (GFR > 90 mL/min/1 73 square meters)    Stage 2 Mild CKD (GFR = 60-89 mL/min/1 73 square meters)    Stage 3A Moderate CKD (GFR = 45-59 mL/min/1 73 square meters)    Stage 3B Moderate CKD (GFR = 30-44 mL/min/1 73 square meters)    Stage 4 Severe CKD (GFR = 15-29 mL/min/1 73 square meters)    Stage 5 End Stage CKD (GFR <15 mL/min/1 73 square meters)  Note: GFR calculation is accurate only with a steady state creatinine    Troponin I [[id number]]  (Normal) Collected: 07/15/21 2317    Lab Status: Final result Specimen: Blood from Arm, Right Updated: 07/15/21 2345     Troponin I <0 03 ng/mL     Protime-INR [[id number]]  (Normal) Collected: 07/15/21 2317    Lab Status: Final result Specimen: Blood from Arm, Right Updated: 07/15/21 2339     Protime 13 4 seconds      INR 1 03 APTT [[id number]]  (Normal) Collected: 07/15/21 2317    Lab Status: Final result Specimen: Blood from Arm, Right Updated: 07/15/21 2339     PTT 28 seconds     CBC and differential [[id number]]  (Abnormal) Collected: 07/15/21 2317    Lab Status: Final result Specimen: Blood from Arm, Right Updated: 07/15/21 2326     WBC 13 80 Thousand/uL      RBC 5 21 Million/uL      Hemoglobin 16 7 g/dL      Hematocrit 49 6 %      MCV 95 fL      MCH 32 1 pg      MCHC 33 7 g/dL      RDW 13 9 %      MPV 8 7 fL      Platelets 702 Thousands/uL      Neutrophils Relative 71 %      Lymphocytes Relative 18 %      Monocytes Relative 9 %      Eosinophils Relative 1 %      Basophils Relative 1 %      Neutrophils Absolute 9 80 Thousands/µL      Lymphocytes Absolute 2 50 Thousands/µL      Monocytes Absolute 1 20 Thousand/µL      Eosinophils Absolute 0 20 Thousand/µL      Basophils Absolute 0 10 Thousands/µL                  CTA ED chest PE Study   Final Result by Natacha Khan MD (07/16 0142)      Extensive pulmonary emboli including saddle emboli and clot extending throughout the lungs  No CT evidence for right heart strain however dedicated echocardiography is recommended for further evaluation, especially considering the extensive an severe    clot burden  Lungs are hyperinflated and there is mild diffuse bronchial wall thickening concerning for COPD possibly with superimposed degree of bronchial inflammation  Right middle lobe and lingular bronchiectasis and nodular changes suggests an atypical infection    most commonly CODEY/MAC  Recommend imaging follow-up to resolution         I personally discussed this study with Milton Saucedo on 7/16/2021 at 1:40 AM                Workstation performed: [id number]                    Procedures  Procedures         ED Course  ED Course as of Jul 16 0319   u Jul 15, 2021   2332 CBC and differential(!)   Fri Jul 16, 2021   0009 D-Dimer, Quant(!): 9 74   0009 BNP(!): 273   0009 INR: 1 03   0009 Troponin I: <0 03   0009 Comprehensive metabolic panel(!)   2210 Patient is clinically stable      IV is being obtained for CTA      0122 Dw CT tech  She sees large saddle  PE seen    I have seen on CT myself  Will start Heparin         0135 I have reached out Radiology reading room and requested stat read  Radiologist Dr Makenzie Jean is looking at the study now       0141 Dw Dr Jada Rios saddle PE, no CT evidence of Right heart strain  Recomends stat ECHO      0141 Call out to Dr Erich Yoder, ICU        0144 Dw Dr Corky Bear he is on room air and now troponin elevated he can stay here   She also does not feel stat echo is needed based on the stable hemodynamics, lack of need of supplemental oxygen       0149 Seymour text to Mary Alcala w/ hospitalist       2940 Dw Fredrick w/ hospitalist  He does not at all feel it is a good idea to keep the pt here in the ICU  He is reaching out to Dr Velasquez Guerra to discuss       0158 Pt stable  He understands lab and CT results  He understands critical nature of his dx  He understands likely need for transfer      0210 Dw Dr Sebas Ledezma, IR  Reviewed the case  In general saddle PE pt's go to B for tx, reassessment and determination if Thrombectomy is necessary       0215 Leonardo Triplett and Dr Velasquez Guerra are not comfortable keeping the pt here and want the pt transferred       0221 Dw Paz Amador in [facility]      0221 Dw Trent Choi, ICU at [address] the pt in transfer   Pt can go to Step down level 1                HEART Risk Score      Most Recent Value   Heart Score Risk Calculator   History  0 Filed at: 07/16/2021 0010   ECG  1 Filed at: 07/16/2021 0010   Age  1 Filed at: 07/16/2021 0010   Risk Factors  2 Filed at: 07/16/2021 0010   Troponin  0 Filed at: 07/16/2021 0010   HEART Score  4 Filed at: 07/16/2021 0010                          Wells' Criteria for PE      Most Recent Value   Wells' Criteria for PE   Clinical signs and symptoms of DVT  3 Filed at: 07/16/2021 0010   PE is primary diagnosis or equally likely  3 Filed at: 07/16/2021 0010   HR >100  1 5 Filed at: 07/16/2021 0010   Immobilization at least 3 days or Surgery in the previous 4 weeks  1 5 Filed at: 07/16/2021 0010   Previous, objectively diagnosed PE or DVT  0 Filed at: 07/16/2021 0010   Hemoptysis  0 Filed at: 07/16/2021 0010   Malignancy with treatment within 6 months or palliative  0 Filed at: 07/16/2021 0010   Wells' Criteria Total  9 Filed at: 07/16/2021 0010                MDM    Disposition  Final diagnoses:   Acute saddle pulmonary embolism ([address] )     Time reflects when diagnosis was documented in both MDM as applicable and the Disposition within this note     Time User Action Codes Description Comment    7/16/2021  2:19 AM Theoplis Gutting Add [I26 92] Saddle pulmonary embolus ([address] )     7/16/2021  2:19 AM Theoplis Gutting Remove [I26 92] Saddle pulmonary embolus ([address] )     7/16/2021  2:19 AM Theoplis Gutting Add [I26 92] Acute saddle pulmonary embolism [facility])       ED Disposition     ED Disposition Condition Date/Time Comment    Transfer to Another Facility-In Network  Fri Jul 16, 2021  2:19 AM Adilia Womack should be transferred out to [facility]        MD Documentation      Most Recent Value   Patient Condition  The patient has been stabilized such that within reasonable medical probability, no material deterioration of the patient condition or the condition of the unborn child([place]) is likely to result from the transfer   Reason for Transfer  Level of Care needed not available at this facility   Benefits of Transfer  Specialized equipment and/or services available at the receiving facility (Include comment)________________________   Risks of Transfer  Potential for delay in receiving treatment, Loss of IV, Potential deterioration of medical condition, Increased discomfort during transfer, Possible worsening of condition or death during transfer   Accepting Physician  Dr Shazia Read Name, [address]   Sending RITA Rizvi O    Provider Certification  General risk, such as traffic hazards, adverse weather conditions, rough terrain or turbulence, possible failure of equipment (including vehicle or aircraft), or consequences of actions of persons outside the control of the transport personnel, Unanticipated needs of medical equipment and personnel during transport, Risk of worsening condition      RN Documentation      [facility] Name, Kimo Garcia   [facility]      Follow-up Information    None         Discharge Medication List as of 7/16/2021  3:16 AM      CONTINUE these medications which have NOT CHANGED    Details   albuterol (PROVENTIL HFA,VENTOLIN HFA) 90 mcg/act inhaler Inhale 2 puffs every 6 (six) hours as needed for wheezing or shortness of breath, Starting Wed 9/30/2020, Normal      aspirin 81 mg chewable tablet Chew 1 tablet (81 mg total) daily, Starting Mon 3/19/2018, Normal      clonazePAM (KlonoPIN) 1 mg tablet Take 1 tablet (1 mg total) by mouth 2 (two) times a day, Starting Sat 5/29/2021, Normal      furosemide (LASIX) 80 mg tablet Take 1 tablet (80 mg total) by mouth 2 (two) times a day, Starting Thu 4/22/2021, Normal      lamoTRIgine (LaMICtal) 100 mg tablet Take 1 tablet (100 mg total) by mouth 2 (two) times a day, Starting Thu 4/22/2021, Normal      levothyroxine 50 mcg tablet Take 1 tablet (50 mcg total) by mouth daily, Starting Wed 3/24/2021, Normal      metoprolol succinate (TOPROL-XL) 100 mg 24 hr tablet Take 1 tablet (100 mg total) by mouth 2 (two) times a day, Starting Fri 3/12/2021, Normal      potassium chloride (Klor-Con M20) 20 mEq tablet Take 1 tablet (20 mEq total) by mouth daily, Starting Fri 3/5/2021, Normal      QUEtiapine (SEROquel) 25 mg tablet Take 1 tablet (25 mg total) by mouth daily at bedtime, Starting Tue 5/11/2021, No Print      sacubitril-valsartan (Entresto) 49-51 MG TABS Take 1 tablet by mouth 2 (two) times a day, Starting Fri 3/12/2021, Normal      nitroglycerin (NITROSTAT) 0 4 mg SL tablet Place 1 tablet (0 4 mg total) under the tongue every 5 (five) minutes as needed for chest pain, Starting Wed 9/30/2020, Normal           No discharge procedures on file      PDMP Review       Value Time User    PDMP Reviewed  Yes 5/29/2021  9:56 AM Alexy Fuller DO          ED Provider  Electronically Signed by           Yousif Jackson MD  07/16/21 7582       Yousif Jackson MD  07/16/21 0328

## 2021-07-17 VITALS
BODY MASS INDEX: 36.49 KG/M2 | WEIGHT: 275.35 LBS | OXYGEN SATURATION: 96 % | DIASTOLIC BLOOD PRESSURE: 75 MMHG | TEMPERATURE: 98.4 F | RESPIRATION RATE: 20 BRPM | SYSTOLIC BLOOD PRESSURE: 133 MMHG | HEART RATE: 82 BPM | HEIGHT: 73 IN

## 2021-07-17 PROBLEM — J96.00 ACUTE RESPIRATORY FAILURE (HCC): Status: RESOLVED | Noted: 2021-07-16 | Resolved: 2021-07-17

## 2021-07-17 LAB
ANION GAP SERPL CALCULATED.3IONS-SCNC: 4 MMOL/L (ref 4–13)
ATRIAL RATE: 82 BPM
BASOPHILS # BLD AUTO: 0.05 THOUSANDS/ΜL (ref 0–0.1)
BASOPHILS NFR BLD AUTO: 1 % (ref 0–1)
BUN SERPL-MCNC: 13 MG/DL (ref 5–25)
CALCIUM SERPL-MCNC: 8.7 MG/DL (ref 8.3–10.1)
CHLORIDE SERPL-SCNC: 105 MMOL/L (ref 100–108)
CO2 SERPL-SCNC: 28 MMOL/L (ref 21–32)
CREAT SERPL-MCNC: 0.76 MG/DL (ref 0.6–1.3)
EOSINOPHIL # BLD AUTO: 0.22 THOUSAND/ΜL (ref 0–0.61)
EOSINOPHIL NFR BLD AUTO: 3 % (ref 0–6)
ERYTHROCYTE [DISTWIDTH] IN BLOOD BY AUTOMATED COUNT: 12.9 % (ref 11.6–15.1)
GFR SERPL CREATININE-BSD FRML MDRD: 105 ML/MIN/1.73SQ M
GLUCOSE SERPL-MCNC: 102 MG/DL (ref 65–140)
HCT VFR BLD AUTO: 46 % (ref 36.5–49.3)
HGB BLD-MCNC: 15.7 G/DL (ref 12–17)
IMM GRANULOCYTES # BLD AUTO: 0.03 THOUSAND/UL (ref 0–0.2)
IMM GRANULOCYTES NFR BLD AUTO: 0 % (ref 0–2)
LYMPHOCYTES # BLD AUTO: 2.47 THOUSANDS/ΜL (ref 0.6–4.47)
LYMPHOCYTES NFR BLD AUTO: 29 % (ref 14–44)
MCH RBC QN AUTO: 32.4 PG (ref 26.8–34.3)
MCHC RBC AUTO-ENTMCNC: 34.1 G/DL (ref 31.4–37.4)
MCV RBC AUTO: 95 FL (ref 82–98)
MONOCYTES # BLD AUTO: 0.76 THOUSAND/ΜL (ref 0.17–1.22)
MONOCYTES NFR BLD AUTO: 9 % (ref 4–12)
NEUTROPHILS # BLD AUTO: 5.01 THOUSANDS/ΜL (ref 1.85–7.62)
NEUTS SEG NFR BLD AUTO: 58 % (ref 43–75)
NRBC BLD AUTO-RTO: 0 /100 WBCS
P AXIS: 59 DEGREES
PLATELET # BLD AUTO: 131 THOUSANDS/UL (ref 149–390)
PMV BLD AUTO: 10.6 FL (ref 8.9–12.7)
POTASSIUM SERPL-SCNC: 3.3 MMOL/L (ref 3.5–5.3)
PR INTERVAL: 140 MS
QRS AXIS: -58 DEGREES
QRSD INTERVAL: 166 MS
QT INTERVAL: 512 MS
QTC INTERVAL: 598 MS
RBC # BLD AUTO: 4.85 MILLION/UL (ref 3.88–5.62)
SODIUM SERPL-SCNC: 137 MMOL/L (ref 136–145)
T WAVE AXIS: 74 DEGREES
VENTRICULAR RATE: 82 BPM
WBC # BLD AUTO: 8.54 THOUSAND/UL (ref 4.31–10.16)

## 2021-07-17 PROCEDURE — 80048 BASIC METABOLIC PNL TOTAL CA: CPT | Performed by: NURSE PRACTITIONER

## 2021-07-17 PROCEDURE — 93010 ELECTROCARDIOGRAM REPORT: CPT | Performed by: INTERNAL MEDICINE

## 2021-07-17 PROCEDURE — 85025 COMPLETE CBC W/AUTO DIFF WBC: CPT | Performed by: NURSE PRACTITIONER

## 2021-07-17 PROCEDURE — 93005 ELECTROCARDIOGRAM TRACING: CPT

## 2021-07-17 PROCEDURE — 99232 SBSQ HOSP IP/OBS MODERATE 35: CPT | Performed by: INTERNAL MEDICINE

## 2021-07-17 PROCEDURE — 99239 HOSP IP/OBS DSCHRG MGMT >30: CPT | Performed by: NURSE PRACTITIONER

## 2021-07-17 RX ORDER — POTASSIUM CHLORIDE 20 MEQ/1
40 TABLET, EXTENDED RELEASE ORAL ONCE
Status: COMPLETED | OUTPATIENT
Start: 2021-07-17 | End: 2021-07-17

## 2021-07-17 RX ADMIN — ASPIRIN 81 MG CHEWABLE TABLET 81 MG: 81 TABLET CHEWABLE at 09:40

## 2021-07-17 RX ADMIN — LEVOTHYROXINE SODIUM 50 MCG: 50 TABLET ORAL at 05:00

## 2021-07-17 RX ADMIN — CLONAZEPAM 1 MG: 1 TABLET ORAL at 09:40

## 2021-07-17 RX ADMIN — METOPROLOL SUCCINATE 100 MG: 100 TABLET, EXTENDED RELEASE ORAL at 09:45

## 2021-07-17 RX ADMIN — LAMOTRIGINE 100 MG: 100 TABLET ORAL at 10:38

## 2021-07-17 RX ADMIN — NICOTINE 1 PATCH: 21 PATCH, EXTENDED RELEASE TRANSDERMAL at 09:42

## 2021-07-17 RX ADMIN — TIOTROPIUM BROMIDE 18 MCG: 18 CAPSULE ORAL; RESPIRATORY (INHALATION) at 09:48

## 2021-07-17 RX ADMIN — ENOXAPARIN SODIUM 105 MG: 120 INJECTION SUBCUTANEOUS at 07:23

## 2021-07-17 RX ADMIN — POTASSIUM CHLORIDE 40 MEQ: 1500 TABLET, EXTENDED RELEASE ORAL at 09:40

## 2021-07-17 RX ADMIN — SACUBITRIL AND VALSARTAN 1 TABLET: 49; 51 TABLET, FILM COATED ORAL at 09:45

## 2021-07-17 RX ADMIN — APIXABAN 10 MG: 5 TABLET, FILM COATED ORAL at 10:39

## 2021-07-17 RX ADMIN — FUROSEMIDE 80 MG: 80 TABLET ORAL at 09:46

## 2021-07-17 NOTE — NURSING NOTE
Pt has refused wearing cpap at night but agreed to wearing nasal canula oxygen  Pt educated on the importance of wearing cpap

## 2021-07-17 NOTE — PROGRESS NOTES
Progress Note - Pulmonary   Shelbina Comings 44 y o  male MRN: [id number]  Unit/Bed#: [facility] 525-01 Encounter: [id number]      Assessment and Plan:     Acute hypoxic respiratory failure secondary to acute pulmonary embolism - unclear if provoked   - 2D echo showed EF 35%, diffuse hypokinesis, LV dilation, reduced RV systolic function   - LE duplex with acute non-occlusive DVT in the gastrocnemius, popliteal, and distal femoral veins, acute occlusive superficial thrombophlebitis    - CTA on 7/16 shows extensive emboli saddle and extending throughout b/l  Also evidence of bronchial wall thickening with RML and lingular bronchiectasis and nodular changes   - wean oxygen for goal 90-92%   - , troponin negative   - d-dimer 9 74  - switched to Eliquis   - provoking risk factors: tobacco abuse, obesity, and prolonged sedentary periods   - patient has not gotten screening colonoscopy yet- it was cancelled before- he will need to obtain this   - will need at least 6 months of AC and discuss risks vs benefits for lifelong or checking thrombosis panel   - recommend ambulating to ensure no oxygen needs on exertion  - repeat 2D echo in 3 months (ordered)      Moderately severe COPD - not in acute exacerbation   - follows with Dr Johana Humphreys   - on Albuterol prn  And Spiriva prn as per patient   - FEV1 53%       MARRY   - intolerant to CPAP      Tobacco Abuse   - on NRT   - continues to smoke     Thrombocytopenia   - monitor closely for bleeding with AC      Chronic HFrEF s/p BiV ICD  Dilated NICMP     Subjective:   Patient seen/examined this Am  Was off oxygen, feels improved  Will be discharged today as per patient      Review of Systems    Objective:     Vitals:    07/16/21 2202 07/17/21 0300 07/17/21 0624 07/17/21 0945   BP: 143/81 107/65 93/64 133/75   BP Location: Right arm Right arm Left arm    Pulse: 86 75 67 82   Resp: 22 20 20    Temp: 98 4 °F (36 9 °C) (!) 97 4 °F (36 3 °C) 98 4 °F (36 9 °C)    TempSrc: Oral Oral Axillary SpO2:  96%     Weight:  125 kg (275 lb 5 7 oz)     Height:               Intake/Output Summary (Last 24 hours) at 7/17/2021 1131  Last data filed at 7/16/2021 1818  Gross per 24 hour   Intake 240 ml   Output 500 ml   Net -260 ml         Physical Exam  Constitutional:       Appearance: Normal appearance  He is obese  HENT:      Head: Normocephalic  Nose: Nose normal       Mouth/Throat:      Mouth: Mucous membranes are moist    Eyes:      Pupils: Pupils are equal, round, and reactive to light  Cardiovascular:      Rate and Rhythm: Normal rate and regular rhythm  Pulses: Normal pulses  Heart sounds: Normal heart sounds  Pulmonary:      Effort: Pulmonary effort is normal       Breath sounds: Normal breath sounds  Abdominal:      General: Abdomen is flat  Palpations: Abdomen is soft  Musculoskeletal:      Left lower leg: Edema present  Skin:     General: Skin is warm  Neurological:      General: No focal deficit present  Mental Status: He is alert and oriented to person, place, and time  Labs: I have personally reviewed pertinent lab results    Results from last 7 days   Lab Units 07/17/21  0452 07/15/21  2317   WBC Thousand/uL 8 54 13 80*   HEMOGLOBIN g/dL 15 7 16 7   HEMATOCRIT % 46 0 49 6*   PLATELETS Thousands/uL 131* 138*   NEUTROS PCT % 58 71   MONOS PCT % 9 9      Results from last 7 days   Lab Units 07/17/21  0452 07/15/21  2317   POTASSIUM mmol/L 3 3* 4 0   CHLORIDE mmol/L 105 101   CO2 mmol/L 28 25   BUN mg/dL 13 14   CREATININE mg/dL 0 76 0 94   CALCIUM mg/dL 8 7 9 4   ALK PHOS U/L  --  55   ALT U/L  --  18   AST U/L  --  25              Results from last 7 days   Lab Units 07/15/21  2317   INR  1 03   PTT seconds 28         0   Lab Value Date/Time    TROPONINI <0 03 07/15/2021 2317    TROPONINI <0 02 06/28/2017 2107    TROPONINI <0 02 06/28/2017 1811    TROPONINI <0 02 06/28/2017 1743       Microbiology:  None     Imaging and other studies: I have personally reviewed pertinent reports     and I have personally reviewed pertinent films in PACS          Talia Callahan MD   Pulmonary & Critical Care Fellow  [facility]

## 2021-07-17 NOTE — DISCHARGE SUMMARY
Discharge Summary - [facility] Internal Medicine    Patient Information: Will Umaña 44 y o  male MRN: [id number]  Unit/Bed#: [facility] 525-01 Encounter: [id number]    Discharging Physician / Practitioner: SHERRY Garcia  PCP: Tristan Núñez DO  Admission Date: 7/16/2021  Discharge Date: 07/17/21    Reason for Admission: acute saddle PE, lower extremity DVT    Discharge Diagnoses:     Principal Problem:    Acute saddle pulmonary embolism without acute cor pulmonale (HCC)  Active Problems:    Acute on chronic systolic (congestive) heart failure ([facility] [address] )    Noncompliance with medication regimen    COPD (chronic obstructive pulmonary disease) ([facility] )    Tobacco abuse    Anxiety and depression    MARRY (obstructive sleep apnea)  Resolved Problems:    Acute respiratory failure ([place] [address] )      Consultations During Hospital Stay:  · Pulmonology    Procedures Performed:     · None    Significant Findings / Test Results:     · CT chest PE study Extensive pulmonary emboli including saddle emboli and clot extending throughout the lungs  No CT evidence for right heart strain however dedicated echocardiography is recommended for further evaluation, especially considering the extensive an severe clot burden  Lungs are hyperinflated and there is mild diffuse bronchial wall thickening concerning for COPD possibly with superimposed degree of bronchial inflammation  Right middle lobe and lingular bronchiectasis and nodular changes suggests an atypical infection most commonly CODEY/MAC  Recommend imaging follow-up to resolutionRecommend imaging follow-up to resolution  · LE venous duplex right lower extremity negative, left lower extremity acute nonocclusive deep venous thrombosis noted in the gastrocnemius,popliteal, and distal femoral veins  Acute occlusive superficial thrombophlebitis noted in the small saphenous vein  Doppler evaluation shows a normal response to augmentation maneuvers  Popliteal, posterior tibial and anterior tibial arterial Doppler waveforms are triphasic/hyperemic  · X-ray left tibia-fibula negative    Incidental Findings:   · None     Test Results Pending at Discharge (will require follow up): · None     Outpatient Tests Requested:  · Outpatient follow-up with PCP  · Outpatient follow-up with pulmonology as soon as possible  · Repeat chest imaging to be determined by pulmonologist  · Outpatient hypercoagulable workup    Complications:  None    Hospital Course:     Phillip Morris is a 44 y o  male patient with past medical history of moderate to severe COPD, MARRY intolerant to CPAP, tobacco use and continues to smoke, chronic systolic congestive Heart failure status post Bi V ICD who originally presented to the hospital on 7/16/2021 due to shortness of breath and lower extremity edema  Patient was found to have extensive emboli saddle and extending throughout bilaterally  Also found to have nonocclusive left lower extremity DVT  Unclear if provoked episode  Was started on therapeutic Lovenox, 2D echo was negative for heart strain  Patient initially required oxygen  He also examined volume overloaded on admission consistent with acute heart failure and was given 1 dose of intravenous Lasix with improvement in volume status  Patient is now on room air his breathing is at baseline  He is stable for discharge home today on Eliquis  Patient should follow-up with PCP within 1 week  He should also see pulmonology within 1-2 weeks or soon as possible  He should likely have repeat chest imaging given abnormal findings as above however will defer to PCP/pulmonologist   Electronic message sent to PCP and pulmonologist     Condition at Discharge: stable     Discharge Day Visit / Exam:     Subjective:  Patient offers no acute complaints  Respiratory status has improved, no longer feeling short of breath  Lower extremity edema has improved  Still with some tenderness and left calf  Currently on room air      Vitals: Blood Pressure: 93/64 (07/17/21 0624)  Pulse: 67 (07/17/21 0624)  Temperature: 98 4 °F (36 9 °C) (07/17/21 0624)  Temp Source: Axillary (07/17/21 0624)  Respirations: 20 (07/17/21 0624)  Height: 6' 1" (185 4 cm) (07/16/21 0415)  Weight - Scale: 125 kg (275 lb 5 7 oz) (07/17/21 0300)  SpO2: 96 % (07/17/21 0300)  Exam:   Physical Exam  Vitals and nursing note reviewed  Constitutional:       General: He is not in acute distress  Appearance: He is obese  Cardiovascular:      Rate and Rhythm: Normal rate  Pulmonary:      Breath sounds: Decreased breath sounds present  No rales  Abdominal:      Tenderness: There is no abdominal tenderness  Musculoskeletal:         General: Swelling (L>R, improved from yesterday) present  Skin:     General: Skin is warm  Neurological:      Mental Status: He is alert and oriented to person, place, and time  Mental status is at baseline  Psychiatric:         Mood and Affect: Mood normal          Discussion with Family:  Patient    Discharge instructions/Information to patient and family:   See after visit summary for information provided to patient and family  Provisions for Follow-Up Care:  See after visit summary for information related to follow-up care and any pertinent home health orders  Disposition:     Home    For Discharges to   [facility]:   · Not Applicable to this Patient - Not Applicable to this Patient    Planned Readmission: no     Discharge Statement:  I spent 40 minutes discharging the patient  This time was spent on the day of discharge  I had direct contact with the patient on the day of discharge  Greater than 50% of the total time was spent examining patient, answering all patient questions, arranging and discussing plan of care with patient as well as directly providing post-discharge instructions  Additional time then spent on discharge activities      Discharge Medications:  See after visit summary for reconciled discharge medications provided to patient and family        ** Please Note: This note has been constructed using a voice recognition system **

## 2021-07-17 NOTE — CASE MANAGEMENT
Cm advised pt is stable for discharge  Pt was transferred from [facility] and will need a ride back to his car that was left at the hospital  Cm contacted [facility] and [facility] transport scheduled for 11am entrance A  RN made aware

## 2021-07-19 ENCOUNTER — TRANSITIONAL CARE MANAGEMENT (OUTPATIENT)
Dept: FAMILY MEDICINE CLINIC | Facility: CLINIC | Age: 51
End: 2021-07-19

## 2021-07-20 ENCOUNTER — OFFICE VISIT (OUTPATIENT)
Dept: FAMILY MEDICINE CLINIC | Facility: CLINIC | Age: 51
End: 2021-07-20
Payer: MEDICARE

## 2021-07-20 ENCOUNTER — TELEPHONE (OUTPATIENT)
Dept: FAMILY MEDICINE CLINIC | Facility: CLINIC | Age: 51
End: 2021-07-20

## 2021-07-20 VITALS
DIASTOLIC BLOOD PRESSURE: 80 MMHG | OXYGEN SATURATION: 98 % | SYSTOLIC BLOOD PRESSURE: 100 MMHG | BODY MASS INDEX: 37.24 KG/M2 | WEIGHT: 281 LBS | TEMPERATURE: 97 F | RESPIRATION RATE: 20 BRPM | HEART RATE: 91 BPM | HEIGHT: 73 IN

## 2021-07-20 DIAGNOSIS — R10.11 RIGHT UPPER QUADRANT ABDOMINAL PAIN: ICD-10-CM

## 2021-07-20 DIAGNOSIS — I26.92 ACUTE SADDLE PULMONARY EMBOLISM WITHOUT ACUTE COR PULMONALE (HCC): Primary | ICD-10-CM

## 2021-07-20 PROCEDURE — 99495 TRANSJ CARE MGMT MOD F2F 14D: CPT | Performed by: FAMILY MEDICINE

## 2021-07-20 NOTE — PROGRESS NOTES
Wale Walls 1970 male MRN: [id number]    Family Medicine Transition of Care Visit      SUBJECTIVE    CC: SCHWAR[facility] [place] Visit     Transitional Care Management Review:  Wale Walls is a 44 y o  male here for TCM follow up  Admitted 7/16-7/17 for acute saddle PE/DVT  Hospital course as per discharge summary as below:     "   Reason for Admission: acute saddle PE, lower extremity DVT     Discharge Diagnoses:      Principal Problem:    Acute saddle pulmonary embolism without acute cor pulmonale (HCC)  Active Problems:    Acute on chronic systolic (congestive) heart failure (HCC)    Noncompliance with medication regimen    COPD (chronic obstructive pulmonary disease) (HCC)    Tobacco abuse    Anxiety and depression    MARRY (obstructive sleep apnea)  Resolved Problems:    Acute respiratory failure ([address] )        Consultations During Hospital Stay:  · Pulmonology     Procedures Performed:      · None     Significant Findings / Test Results:      · CT chest PE study Extensive pulmonary emboli including saddle emboli and clot extending throughout the lungs  No CT evidence for right heart strain however dedicated echocardiography is recommended for further evaluation, especially considering the extensive an severe clot burden  Lungs are hyperinflated and there is mild diffuse bronchial wall thickening concerning for COPD possibly with superimposed degree of bronchial inflammation  Right middle lobe and lingular bronchiectasis and nodular changes suggests an atypical infection most commonly CODEY/MAC  Recommend imaging follow-up to resolutionRecommend imaging follow-up to resolution  · LE venous duplex right lower extremity negative, left lower extremity acute nonocclusive deep venous thrombosis noted in the gastrocnemius,popliteal, and distal femoral veins  Acute occlusive superficial thrombophlebitis noted in the small saphenous vein  Doppler evaluation shows a normal response to augmentation maneuvers  Popliteal, posterior tibial and anterior tibial arterial Doppler waveforms are triphasic/hyperemic  · X-ray left tibia-fibula negative     Incidental Findings:   · None      Test Results Pending at Discharge (will require follow up): · None     Outpatient Tests Requested:  · Outpatient follow-up with PCP  · Outpatient follow-up with pulmonology as soon as possible  · Repeat chest imaging to be determined by pulmonologist  · Outpatient hypercoagulable workup     Complications:  None     Hospital Course:      Monisha Burroughs is a 44 y o  male patient with past medical history of moderate to severe COPD, MARRY intolerant to CPAP, tobacco use and continues to smoke, chronic systolic congestive Heart failure status post Bi V ICD who originally presented to the hospital on 7/16/2021 due to shortness of breath and lower extremity edema  Patient was found to have extensive emboli saddle and extending throughout bilaterally  Also found to have nonocclusive left lower extremity DVT  Unclear if provoked episode  Was started on therapeutic Lovenox, 2D echo was negative for heart strain  Patient initially required oxygen  He also examined volume overloaded on admission consistent with acute heart failure and was given 1 dose of intravenous Lasix with improvement in volume status  Patient is now on room air his breathing is at baseline  He is stable for discharge home today on Eliquis      Patient should follow-up with PCP within 1 week  He should also see pulmonology within 1-2 weeks or soon as possible    He should likely have repeat chest imaging given abnormal findings as above however will defer to PCP/pulmonologist   Electronic message sent to PCP and pulmonologist "    During the TCM phone call patient stated:    TCM Call (since 6/20/2021)     Date and time call was made  7/19/2021  9:21 AM    Patient was hospitialized at  [facility]        Date of Admission  07/16/21    Date of discharge  07/17/21    Diagnosis  Acute saddle pulmonary embolsim without acutre core pulmonal [facility]    Disposition  Home    Current Symptoms  Upper abdominal pain (Comment)  stated  gut hurts like a champ    Upper abdominal pain severity  Moderate    Upper abdominal pain onset  Ongoing      TCM Call (since 6/20/2021)     Post hospital issues  Reduced activity    Scheduled for follow up? Yes    Did you obtain your prescribed medications  Yes    Do you need help managing your prescriptions or medications  No    Is transportation to your appointment needed  No    I have advised the patient to call PCP with any new or worsening symptoms  I[address] 76  None          During today's visit:    He has an appointment with pulmonology 7/28  Breathing is okay, no chest pain or tightness  Not sleeping well, very fatigued  New abdominal pain across upper abdomen, hiccupping frequently  Sharp pain, starts right, around epigastric and then to left side  Not worse with eating  He has had this before with pressure on the stomach (dog jumping on him)  Last couple days painful, comes and goes  Normal bowel movements, urinating normally  Appetite decreased but this is not new  This pain first started a few months, occurring more frequently  Nothing makes it better, lasts for a couple minutes  Doubles over in pain when it occurs  Occurring 6 times a day  No fevers or chills  No gastric reflux  They are complying with their medication changes and discharge instructions  They have the following questions: As above    Review of Systems   Constitutional: Negative for chills, fatigue and fever  Respiratory: Negative for cough, chest tightness, shortness of breath and wheezing  Cardiovascular: Negative for chest pain and palpitations  Gastrointestinal: Positive for abdominal pain  Negative for blood in stool, constipation, diarrhea, nausea and vomiting     Genitourinary: Negative for decreased urine volume, difficulty urinating and dysuria  Skin: Negative for rash  Neurological: Negative for dizziness, light-headedness and headaches  Historical Information     The patient history was reviewed as follows:    Past Medical History:   Diagnosis Date    Anxiety and depression     [facility]     COPD [facility]ch[facility] disease) ([facility])     Dilated cardiomyopathy ([facility] [address] )     Hypertension     MARRY (obstructive sleep apnea)      Past Surgical History:   Procedure Laterality Date    BRONCHOSCOPY  03/10/2017    CARDIAC PACEMAKER PLACEMENT  10/04/2017     Family History   Problem Relation Age of Onset    Diabetes Mother     Emphysema Mother     No Known Problems Father     Emphysema Maternal Aunt       Social History   Social History     Substance and Sexual Activity   Alcohol Use Yes    Comment: Socially, several times per week     Social History     Substance and Sexual Activity   Drug Use No     Social History     Tobacco Use   Smoking Status Current Every Day Smoker    Packs/day: 1 50    Years: 40 00    Pack years: 60 00    Types: Cigarettes   Smokeless Tobacco Former User   Tobacco Comment    currently, 1ppd or less       Medications:   Meds/Allergies     Current Outpatient Medications:     albuterol (PROVENTIL HFA,VENTOLIN HFA) 90 mcg/act inhaler, Inhale 2 puffs every 6 (six) hours as needed for wheezing or shortness of breath, Disp: 1 Inhaler, Rfl: 5    apixaban (ELIQUIS) 5 mg, Take 2 tablets (10 mg total) by mouth 2 (two) times a day for 7 days, THEN 1 tablet (5 mg total) 2 (two) times a day for 23 days  , Disp: 74 tablet, Rfl: 0    aspirin 81 mg chewable tablet, Chew 1 tablet (81 mg total) daily, Disp: 90 tablet, Rfl: 3    clonazePAM (KlonoPIN) 1 mg tablet, Take 1 tablet (1 mg total) by mouth 2 (two) times a day, Disp: 60 tablet, Rfl: 0    furosemide (LASIX) 80 mg tablet, Take 1 tablet (80 mg total) by mouth 2 (two) times a day, Disp: 180 tablet, Rfl: 0    lamoTRIgine (LaMICtal) 100 mg tablet, Take 1 tablet (100 mg total) by mouth 2 (two) times a day, Disp: 60 tablet, Rfl: 0    levothyroxine 50 mcg tablet, Take 1 tablet (50 mcg total) by mouth daily, Disp: 90 tablet, Rfl: 0    metoprolol succinate (TOPROL-XL) 100 mg 24 hr tablet, Take 1 tablet (100 mg total) by mouth 2 (two) times a day, Disp: 180 tablet, Rfl: 3    nitroglycerin (NITROSTAT) 0 4 mg SL tablet, Place 1 tablet (0 4 mg total) under the tongue every 5 (five) minutes as needed for chest pain, Disp: 10 tablet, Rfl: 0    potassium chloride (Klor-Con M20) 20 mEq tablet, Take 1 tablet (20 mEq total) by mouth daily, Disp: 30 tablet, Rfl: 3    QUEtiapine (SEROquel) 25 mg tablet, Take 1 tablet (25 mg total) by mouth daily at bedtime, Disp: , Rfl:     sacubitril-valsartan (Entresto) 49-51 MG TABS, Take 1 tablet by mouth 2 (two) times a day, Disp: 180 tablet, Rfl: 3  Allergies   Allergen Reactions    Chantix [Varenicline] Other (See Comments)     Depression       OBJECTIVE    Vitals:   Vitals:    07/20/21 1103   BP: 100/80   BP Location: Left arm   Patient Position: Sitting   Cuff Size: Large   Pulse: 91   Resp: 20   Temp: (!) 97 °F (36 1 °C)   SpO2: 98%   Weight: 127 kg (281 lb)   Height: 6' 1" (1 854 m)       Body mass index is 37 07 kg/m²  Physical Exam:    Physical Exam  Vitals reviewed  Constitutional:       General: He is not in acute distress  Appearance: Normal appearance  He is not ill-appearing, toxic-appearing or diaphoretic  HENT:      Head: Normocephalic and atraumatic  Eyes:      General:         Right eye: No discharge  Left eye: No discharge  Extraocular Movements: Extraocular movements intact  Conjunctiva/sclera: Conjunctivae normal    Cardiovascular:      Rate and Rhythm: Normal rate and regular rhythm  Heart sounds: Normal heart sounds  No murmur heard  No friction rub  No gallop  Pulmonary:      Effort: Pulmonary effort is normal  No respiratory distress  Breath sounds: Normal breath sounds  No stridor  No wheezing or rhonchi  Comments: Diminished breath sounds bilaterally at bases  Abdominal:      General: Bowel sounds are normal  There is no distension  Palpations: Abdomen is soft  There is no mass  Tenderness: There is abdominal tenderness  There is no guarding or rebound  Comments: Tenderness RUQ and epigastric region    Musculoskeletal:      Cervical back: Normal range of motion and neck supple  No rigidity  Right lower leg: Edema present  Left lower leg: Edema present  Comments: Left greater than right, non-tender   Skin:     General: Skin is warm  Coloration: Skin is not pale  Findings: No erythema or rash  Neurological:      Mental Status: He is alert and oriented to person, place, and time  Motor: No weakness  Psychiatric:         Mood and Affect: Mood normal          Behavior: Behavior normal           Labs: I have personally reviewed all pertinent results  Admission on 07/16/2021, Discharged on 07/17/2021   Component Date Value Ref Range Status    Sodium 07/17/2021 137  136 - 145 mmol/L Final    Potassium 07/17/2021 3 3* 3 5 - 5 3 mmol/L Final    Chloride 07/17/2021 105  100 - 108 mmol/L Final    CO2 07/17/2021 28  21 - 32 mmol/L Final    ANION GAP 07/17/2021 4  4 - 13 mmol/L Final    BUN 07/17/2021 13  5 - 25 mg/dL Final    Creatinine 07/17/2021 0 76  0 60 - 1 30 mg/dL Final    Standardized to IDMS reference method    Glucose 07/17/2021 102  65 - 140 mg/dL Final    If the patient is fasting, the ADA then defines impaired fasting glucose as > 100 mg/dL and diabetes as > or equal to 123 mg/dL  Specimen collection should occur prior to Sulfasalazine administration due to the potential for falsely depressed results  Specimen collection should occur prior to Sulfapyridine administration due to the potential for falsely elevated results      Calcium 07/17/2021 8 7  8 3 - 10 1 mg/dL Final    eGFR 07/17/2021 105  ml/min/1 73sq m Final    WBC 07/17/2021 8 54  4 31 - 10 16 Thousand/uL Final    RBC 07/17/2021 4 85  3 88 - 5 62 Million/uL Final    Hemoglobin 07/17/2021 15 7  12 0 - 17 0 g/dL Final    Hematocrit 07/17/2021 46 0  36 5 - 49 3 % Final    MCV 07/17/2021 95  82 - 98 fL Final    MCH 07/17/2021 32 4  26 8 - 34 3 pg Final    MCHC 07/17/2021 34 1  31 4 - 37 4 g/dL Final    RDW 07/17/2021 12 9  11 6 - 15 1 % Final    MPV 07/17/2021 10 6  8 9 - 12 7 fL Final    Platelets 28/88/6669 131* 149 - 390 Thousands/uL Final    nRBC 07/17/2021 0  /100 WBCs Final    Neutrophils Relative 07/17/2021 58  43 - 75 % Final    Immat GRANS % 07/17/2021 0  0 - 2 % Final    Lymphocytes Relative 07/17/2021 29  14 - 44 % Final    Monocytes Relative 07/17/2021 9  4 - 12 % Final    Eosinophils Relative 07/17/2021 3  0 - 6 % Final    Basophils Relative 07/17/2021 1  0 - 1 % Final    Neutrophils Absolute 07/17/2021 5 01  1 85 - 7 62 Thousands/µL Final    Immature Grans Absolute 07/17/2021 0 03  0 00 - 0 20 Thousand/uL Final    Lymphocytes Absolute 07/17/2021 2 47  0 60 - 4 47 Thousands/µL Final    Monocytes Absolute 07/17/2021 0 76  0 17 - 1 22 Thousand/µL Final    Eosinophils Absolute 07/17/2021 0 22  0 00 - 0 61 Thousand/µL Final    Basophils Absolute 07/17/2021 0 05  0 00 - 0 10 Thousands/µL Final    Ventricular Rate 07/17/2021 82  BPM Final    Atrial Rate 07/17/2021 82  BPM Final    RI Interval 07/17/2021 140  ms Final    QRSD Interval 07/17/2021 166  ms Final    QT Interval 07/17/2021 512  ms Final    QTC Interval 07/17/2021 598  ms Final    P Axis 07/17/2021 59  degrees Final    QRS Axis 07/17/2021 -58  degrees Final    T Wave [place] 07/17/2021 74  degrees Final       Imaging:  I have personally reviewed all pertinent results  Assessment/Plan    No problem-specific Assessment & Plan notes found for this encounter  Josef Dunn was seen today for transition of care management      Diagnoses and all orders for this visit:    Acute saddle pulmonary embolism without acute cor pulmonale (HCC)    Right upper quadrant abdominal pain  -     US right upper quadrant; Future      Follow-up with pulmonology as scheduled  Potassium noted to be mildly low on discharge 3 3  They recommended a hypercoagulable work-up, and so we can plan on BMP with this  Will contact pulmonology to see if they plan on ordering versus us   Abdominal pain occurring intermittently seems to be consistent with biliary colic, will obtain RUQ US and likely general surgery referral        Future Appointments   Date Time Provider Carito Frida   7/24/2021  9:30 [facility]   7/28/2021 11:00 AM [facility]   7/29/2021  1:30 PM Coleen Hatchet, PA-C WGT MGT PA Practice-[facility]   8/4/2021  3:00 PM DEVICE IN PERSON HILDA Good   8/4/2021  3:40 PM DO JAYCEE Miller BE Practice[facility]   8/17/2021  3:00 PM DO TAE Yusuf [facility]   10/15/2021  3:40 PM Laura Ozuna MD [facility], [id number] [facility] Care

## 2021-07-22 ENCOUNTER — TELEPHONE (OUTPATIENT)
Dept: FAMILY MEDICINE CLINIC | Facility: CLINIC | Age: 51
End: 2021-07-22

## 2021-07-22 DIAGNOSIS — E87.6 HYPOKALEMIA: Primary | ICD-10-CM

## 2021-07-22 NOTE — PHYSICIAN ADVISOR
Current patient class: Inpatient  The patient is currently on Hospital Day: 2 at [address] [place] [address]      The patient was admitted to the hospital at  4:49 AM on 7/16/21 for the following diagnosis:  SOB (shortness of breath) [R06 02]  Acute saddle pulmonary embolism without acute cor pulmonale (HCC) [I26 92]       There was documentation in the medical record of an expected length of stay of at least 2 midnights  The patient was therefore expected to satisfy the 2 midnight benchmark and given the 2 midnight presumption was appropriate for INPATIENT ADMISSION  Given this expectation of a satisfying stay, CMS instructs us that the patient is most often appropriate for inpatient admission under part A provided medical necessity is documented in the chart  After review of the relevant documentation, labs, vital signs and test results, the patient is appropriate for INPATIENT ADMISSION  Admission to the hospital as an inpatient is a complex decision making process which requires the practitioner to consider the patients presenting complaint, history and physical examination and all relevant testing  With this in mind, in this case, the patient was deemed appropriate for INPATIENT ADMISSION  After review of the documentation and testing available at the time of the admission, I concur with this clinical determination of medical necessity  For the reason noted, the patient was discharged before reaching 2 midnights as an inpatient  Rationale is as follows: The patient began care in the emergency department at [facility] on 7/15/21  He was transferred to [facility] arriving on July 16th  Reason for transfer was acute saddle pulmonary embolus  Given the reason for transfer and anticipated length of stay, inpatient class was requested which was appropriate  At the time of transfer, he did have tachypnea and was on 2 to 4 L nasal cannula     The patient improved quickly and was only hospitalized at the [facility] for one midnight however two midnights total in network  The patient was symptomatic with shortness of breath and lower extremity edema  He also received a dose of intravenous Lasix for volume overload  The patient was able to be discharged on Eliquis  The patients vitals on arrival were   ED Triage Vitals [07/16/21 0415]   Temperature Pulse Respirations Blood Pressure SpO2   97 5 °F (36 4 °C) 92 (!) 24 148/97 96 %      Temp Source Heart Rate Source Patient Position - Orthostatic VS BP Location FiO2 (%)   Oral Monitor Sitting Right arm --      Pain Score       No Pain           Past Medical History:   Diagnosis Date    Anxiety and depression     CHF (congestive heart failure) (HCC)     COPD (chronic obstructive pulmonary disease) (HCC)     Dilated cardiomyopathy (HCC)     Hypertension     MARRY (obstructive sleep apnea)      Past Surgical History:   Procedure Laterality Date    BRONCHOSCOPY  03/10/2017    CARDIAC PACEMAKER PLACEMENT  10/04/2017           Consults have been placed to:   IP CONSULT TO PULMONOLOGY  IP CONSULT TO CASE MANAGEMENT    Vitals:    07/16/21 2202 07/17/21 0300 07/17/21 0624 07/17/21 0945   BP: 143/81 107/65 93/64 133/75   BP Location: Right arm Right arm Left arm    Pulse: 86 75 67 82   Resp: 22 20 20    Temp: 98 4 °F (36 9 °C) (!) 97 4 °F (36 3 °C) 98 4 °F (36 9 °C)    TempSrc: Oral Oral Axillary    SpO2:  96%     Weight:  125 kg (275 lb 5 7 oz)     Height:           Most recent labs:    No results for input(s): WBC, HGB, HCT, PLT, K, NA, CALCIUM, BUN, CREATININE, LIPASE, AMYLASE, INR, TROPONINI, CKTOTAL, AST, ALT, ALKPHOS, BILITOT in the last 72 hours  Scheduled Meds:  Continuous Infusions:No current facility-administered medications for this encounter  PRN Meds:      Surgical procedures (if appropriate):

## 2021-07-23 NOTE — TELEPHONE ENCOUNTER
Called and left detailed message with patient in regards to provider's recommendations  Advised to contact office if he has any questions / concerns

## 2021-07-23 NOTE — TELEPHONE ENCOUNTER
I see the patient has his ultrasound scheduled for 7/24- please ask him to stop by the lab that day as well to recheck his potassium (was slightly low on hospital discharge 3 3)- thanks!

## 2021-07-24 ENCOUNTER — HOSPITAL ENCOUNTER (OUTPATIENT)
Dept: ULTRASOUND IMAGING | Facility: HOSPITAL | Age: 51
Discharge: HOME | End: 2021-07-24
Payer: MEDICARE

## 2021-07-24 DIAGNOSIS — R10.11 RIGHT UPPER QUADRANT ABDOMINAL PAIN: ICD-10-CM

## 2021-07-24 PROCEDURE — 76705 ECHO EXAM OF ABDOMEN: CPT

## 2021-07-26 DIAGNOSIS — F41.8 DEPRESSION WITH ANXIETY: ICD-10-CM

## 2021-07-26 PROBLEM — E03.9 HYPOTHYROIDISM: Status: ACTIVE | Noted: 2021-07-26

## 2021-07-26 RX ORDER — CLONAZEPAM 1 MG/1
1 TABLET ORAL 2 TIMES DAILY
Qty: 60 TABLET | Refills: 0 | Status: SHIPPED | OUTPATIENT
Start: 2021-07-26 | End: 2021-09-02 | Stop reason: SDUPTHER

## 2021-07-26 NOTE — TELEPHONE ENCOUNTER
Patient requesting refill(s) of: clonazepam 1 mg BID    Last filled:5/29/21 #60 x 0  Last appt:7/20/21  Next appt:8/17/21  [facility]

## 2021-07-26 NOTE — PROGRESS NOTES
Pulmonary Follow Up Note   Magy Gastelum 46 y o  male MRN: [id number]  7/28/2021    Assessment & Plan:    1  Saddle PE without right heart strain with LLE DVT  - provoking factors: obesity, tobacco abuse, sedentary lifesyle,   And recent mild traumatic fall  -  Up-to-date with lung cancer screening CT, recommended age-appropriate cancer screening with PCP including colonoscopy and possibly PSA  -  Elevated proBNP in the setting of cardiomyopathy, negative troponin, normal RV on echocardiogram would categorize as uncomplicated DVT/ PE without submassive or massive features  -  Patient currently tolerating Eliquis, would continue for minimum of 6 months with re-evaluation thereafter  Given provoking factors as above, suspect may be able to stop anticoagulation at 6 month viktor which would be January 2022 and can potentially repeat a D-dimer at that time    2  Moderate COPD FEV1 53% predicted without acute exacerbation  -  Currently taking Spiriva and albuterol as needed  -  I discussed with patient the importance of taking Spiriva scheduled 2 puffs daily with as needed albuterol  - recent lung cancer screening CT noted, can wait for repeat lung cancer screening CT until 07/2022  - UTD with immunizations,  Including COVID vaccine    3  MARRY intolerant of CPAP  -  Unknown severity, previously intolerant to CPAP and has no interest  In restarting therapy    4  Nonischemic cardiomyopathy EF 35% with BiV ICD, follows with heart failure team  - follows with cardiology, on Entresto, BB, aspirin, lasix  -  Recently taking extra doses of Lasix for lower extremity edema, instructed patient to monitor edema and daily weights and follow up as scheduled with cardiologist next week    5  Tobacco abuse current smoker 1 ppd  -He is still smoking about 1 pack per day and trying to cut down  He previously tried nicotine replacement therapy and Chantix without much success  He had significant side effects from Chantix    He Has never tried Wellbutrin; When he is motivated to quit, can trial Wellbutrin, patient states that he will discuss with his PCP    6  Anxiety/depression  - on seroquel and takes clonazepam    7  Hypothyroidism  - on synthroid replacement      * will f/u with Dr Nam Cornelius in January 2022 to re eval, DVT/PE, COPD, MARRY, tobacco abuse     Diagnoses and all orders for this visit:    Moderate COPD (chronic obstructive pulmonary disease) ([facility] )    Acute saddle pulmonary embolism without acute cor pulmonale (HCC)    MARRY (obstructive sleep apnea)    Chronic systolic congestive heart failure ([facility] )    Tobacco abuse    Essential hypertension    Anxiety and depression    Hypothyroidism, unspecified type        Return in about 5 months (around 12/28/2021) for Next scheduled follow up  History of Present Illness      HPI:  Alma Savage is a 46 y o  male with hx of MARRY not on pap, moderate COPD, HTN, recent hospitalization for LLE DVT and saddle PE without right heart strain, who presents for f/u  Has moderate COPD FEV1 53% predicted and dilated non ischemic cardiomyopathy EF 30% with ICD follows with Dr Jo Epperson on lasix 80mg bid  Patient presented to [address] [place] [address] ED with left leg swelling/pain and SOB  CTA chest showed extensive PE including saddle emboli with hyperinflated lungs and mild diffuse bronchial wall thickening  LE duplex showed acute dvt in gastrocnemius, popliteal, distal femoral veins in left leg  TTE showed EF 35%, normal RV size with mildly reduced function, did not mentioned significant pHTN  Mildly elevated proBNP, negative troponin, normal RV on echocardiogram     Patient admitted on 7/16/21 and discharged on 7/17/21  Treated with systemic a/c initially heparin infusion, then therapeutic lovenox, eventually discharged on eliquis  Received 1 dose of IV lasix, initially on 2 L NC then taken off  Oxygen  Patient states that he never had significant shortness of breath, always has mild shortness of breath chronically  He did have left lower extremity swelling and pain  He states that he had a fall in his bathroom about 2 weeks prior to presentation and afterwards developed worsening left lower extremity swelling, prompting presentation to the ER  He denies recent surgery or travel and no personal history of thromboembolism  He does admit to being extremely sedentary spending about 90% of his time on the couch  He states that his mother had an arterial clot in her 76s prompting lower extremity amputation  Since hospitalization, his lower extremity edema has been improving and less pain in the  Left lower extremity  Has had some right lower extremity swelling and has taken a few extra doses of Lasix  He has a follow-up with his cardiologist Dr Handy Dorantes next week  He has never had a colonoscopy  In terms of pulmonary history,   He is still smoking about 1 pack per day and trying to cut down  He previously tried nicotine replacement therapy and Chantix without much success  He had significant side effects from Chantix  He   Has never tried Wellbutrin  He previously worked in construction, 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  He lives at home with his girlfriend  He is prescribed Spiriva and as needed rescue inhaler, but has been using his Spiriva as needed  I discussed the importance of taking this as a maintenance inhaler 2 puffs daily  Review of Systems   Constitutional: Positive for fatigue  Negative for chills, fever and unexpected weight change  HENT: Negative for congestion, rhinorrhea, sneezing and sore throat  Respiratory: Positive for shortness of breath  Negative for cough and wheezing  Cardiovascular: Positive for leg swelling  Negative for chest pain and palpitations  Gastrointestinal: Negative for abdominal pain, constipation, diarrhea, nausea and vomiting  Endocrine: Negative for cold intolerance and heat intolerance     Genitourinary: Negative for dysuria  Musculoskeletal: Negative for arthralgias  Allergic/Immunologic: Negative for immunocompromised state  Neurological: Negative for dizziness and numbness  Historical Information   Past Medical History:   Diagnosis Date    Anxiety and depression     [facility]     [facility]     Dilated cardiomyopathy (HCC)     Hypertension     MARRY (obstructive sleep apnea)      Past Surgical History:   Procedure Laterality Date    BRONCHOSCOPY  03/10/2017    CARDIAC PACEMAKER PLACEMENT  10/04/2017     Family History   Problem Relation Age of Onset    Diabetes Mother     Emphysema Mother     No Known Problems Father     Emphysema Maternal Aunt          Meds/Allergies     Current Outpatient Medications:     albuterol (PROVENTIL HFA,VENTOLIN HFA) 90 mcg/act inhaler, Inhale 2 puffs every 6 (six) hours as needed for wheezing or shortness of breath, Disp: 1 Inhaler, Rfl: 5    apixaban (ELIQUIS) 5 mg, Take 2 tablets (10 mg total) by mouth 2 (two) times a day for 7 days, THEN 1 tablet (5 mg total) 2 (two) times a day for 23 days  , Disp: 74 tablet, Rfl: 0    aspirin 81 mg chewable tablet, Chew 1 tablet (81 mg total) daily, Disp: 90 tablet, Rfl: 3    clonazePAM (KlonoPIN) 1 mg tablet, Take 1 tablet (1 mg total) by mouth 2 (two) times a day, Disp: 60 tablet, Rfl: 0    furosemide (LASIX) 80 mg tablet, Take 1 tablet (80 mg total) by mouth 2 (two) times a day, Disp: 180 tablet, Rfl: 0    lamoTRIgine (LaMICtal) 100 mg tablet, Take 1 tablet (100 mg total) by mouth 2 (two) times a day, Disp: 60 tablet, Rfl: 0    levothyroxine 50 mcg tablet, Take 1 tablet (50 mcg total) by mouth daily, Disp: 90 tablet, Rfl: 0    metoprolol succinate (TOPROL-XL) 100 mg 24 hr tablet, Take 1 tablet (100 mg total) by mouth 2 (two) times a day, Disp: 180 tablet, Rfl: 3    nitroglycerin (NITROSTAT) 0 4 mg SL tablet, Place 1 tablet (0 4 mg total) under the tongue every 5 (five) minutes as needed for chest pain, Disp: 10 tablet, Rfl: 0    potassium chloride (Klor-Con M20) 20 mEq tablet, Take 1 tablet (20 mEq total) by mouth daily, Disp: 30 tablet, Rfl: 3    QUEtiapine (SEROquel) 25 mg tablet, Take 1 tablet (25 mg total) by mouth daily at bedtime, Disp: , Rfl:     sacubitril-valsartan (Entresto) 49-51 MG TABS, Take 1 tablet by mouth 2 (two) times a day, Disp: 180 tablet, Rfl: 3  Allergies   Allergen Reactions    Chantix [Varenicline] Other (See Comments)     Depression       Vitals: Blood pressure 140/98, pulse 95, temperature 97 5 °F (36 4 °C), resp  rate 20, height 6' 1" (1 854 m), weight 127 kg (279 lb), SpO2 97 %  Body mass index is 36 81 kg/m²  Oxygen Therapy  SpO2: 97 %  Oxygen Therapy: None (Room air)    Physical Exam  Constitutional:       General: He is not in acute distress  Appearance: He is well-developed  He is obese  He is not diaphoretic  HENT:      Head: Normocephalic and atraumatic  Mouth/Throat:      Mouth: Mucous membranes are moist       Pharynx: Oropharynx is clear  No oropharyngeal exudate  Eyes:      General: No scleral icterus  Cardiovascular:      Rate and Rhythm: Normal rate and regular rhythm  Heart sounds: Normal heart sounds  No murmur heard  Pulmonary:      Effort: Pulmonary effort is normal  No respiratory distress  Breath sounds: Normal breath sounds  No wheezing  Abdominal:      General: Bowel sounds are normal  There is no distension  Palpations: Abdomen is soft  Tenderness: There is no abdominal tenderness  Musculoskeletal:      Comments: B/l LE edema, L > R   Neurological:      Mental Status: He is alert and oriented to person, place, and time  Labs: I have personally reviewed pertinent lab results    Lab Results   Component Value Date    WBC 8 54 07/17/2021    HGB 15 7 07/17/2021    HCT 46 0 07/17/2021    MCV 95 07/17/2021     (L) 07/17/2021     Lab Results   Component Value Date CALCIUM 8 7 07/17/2021    K 3 3 (L) 07/17/2021    CO2 28 07/17/2021     07/17/2021    BUN 13 07/17/2021    CREATININE 0 76 07/17/2021     No results found for: IGE  Lab Results   Component Value Date    ALT 18 07/15/2021    AST 25 07/15/2021    ALKPHOS 55 07/15/2021         Imaging and other studies: I have personally reviewed pertinent reports  and I have personally reviewed pertinent films in PACS  CTA chest 7/16/21  extensive PE including saddle emboli with hyperinflated lungs and mild diffuse bronchial wall thickening  LE duplex 7/16/21  acute dvt in gastrocnemius, popliteal, distal femoral veins in left leg  Pulmonary function testing:   Spirometry 10/27/20  FEV1/FVC 65%   FEV1 2 25 L, 53% predicted   FVC 3 47 L, 63% predicted     Impression:   Moderately severe obstructive ventilatory flow limitation with low vital capacity   Obstructed appearing flow volume loop      EKG, Pathology, and Other Studies: I have personally reviewed pertinent reports  TTE 7/16/21  EF 35%, normal RV size with mildly reduced function, did not mentioned significant pHTN          Ken Donald MD  Pulmonary & Critical Care Fellow, [facility]

## 2021-07-28 ENCOUNTER — OFFICE VISIT (OUTPATIENT)
Dept: PULMONOLOGY | Facility: CLINIC | Age: 51
End: 2021-07-28
Payer: MEDICARE

## 2021-07-28 VITALS
WEIGHT: 279 LBS | HEART RATE: 95 BPM | RESPIRATION RATE: 20 BRPM | TEMPERATURE: 97.5 F | HEIGHT: 73 IN | SYSTOLIC BLOOD PRESSURE: 140 MMHG | DIASTOLIC BLOOD PRESSURE: 98 MMHG | OXYGEN SATURATION: 97 % | BODY MASS INDEX: 36.98 KG/M2

## 2021-07-28 DIAGNOSIS — I26.92 ACUTE SADDLE PULMONARY EMBOLISM WITHOUT ACUTE COR PULMONALE (HCC): ICD-10-CM

## 2021-07-28 DIAGNOSIS — I10 ESSENTIAL HYPERTENSION: ICD-10-CM

## 2021-07-28 DIAGNOSIS — F41.9 ANXIETY AND DEPRESSION: ICD-10-CM

## 2021-07-28 DIAGNOSIS — I50.22 CHRONIC SYSTOLIC CONGESTIVE HEART FAILURE (HCC): ICD-10-CM

## 2021-07-28 DIAGNOSIS — Z72.0 TOBACCO ABUSE: ICD-10-CM

## 2021-07-28 DIAGNOSIS — E03.9 HYPOTHYROIDISM, UNSPECIFIED TYPE: ICD-10-CM

## 2021-07-28 DIAGNOSIS — G47.33 OSA (OBSTRUCTIVE SLEEP APNEA): ICD-10-CM

## 2021-07-28 DIAGNOSIS — F32.A ANXIETY AND DEPRESSION: ICD-10-CM

## 2021-07-28 DIAGNOSIS — J44.9 MODERATE COPD (CHRONIC OBSTRUCTIVE PULMONARY DISEASE) (HCC): Primary | ICD-10-CM

## 2021-07-28 PROCEDURE — 99215 OFFICE O/P EST HI 40 MIN: CPT | Performed by: INTERNAL MEDICINE

## 2021-07-29 ENCOUNTER — CONSULT (OUTPATIENT)
Dept: BARIATRICS | Facility: CLINIC | Age: 51
End: 2021-07-29
Payer: MEDICARE

## 2021-07-29 VITALS
SYSTOLIC BLOOD PRESSURE: 110 MMHG | HEIGHT: 73 IN | WEIGHT: 278.2 LBS | DIASTOLIC BLOOD PRESSURE: 70 MMHG | HEART RATE: 93 BPM | BODY MASS INDEX: 36.87 KG/M2 | TEMPERATURE: 98.1 F

## 2021-07-29 DIAGNOSIS — E03.9 HYPOTHYROIDISM, UNSPECIFIED TYPE: ICD-10-CM

## 2021-07-29 DIAGNOSIS — J44.9 MODERATE COPD (CHRONIC OBSTRUCTIVE PULMONARY DISEASE) (HCC): ICD-10-CM

## 2021-07-29 DIAGNOSIS — E66.01 SEVERE OBESITY (BMI 35.0-39.9) WITH COMORBIDITY (HCC): Primary | ICD-10-CM

## 2021-07-29 DIAGNOSIS — G47.33 OSA (OBSTRUCTIVE SLEEP APNEA): ICD-10-CM

## 2021-07-29 DIAGNOSIS — I50.22 CHRONIC SYSTOLIC CHF (CONGESTIVE HEART FAILURE) (HCC): ICD-10-CM

## 2021-07-29 DIAGNOSIS — F41.9 ANXIETY AND DEPRESSION: ICD-10-CM

## 2021-07-29 DIAGNOSIS — F32.A ANXIETY AND DEPRESSION: ICD-10-CM

## 2021-07-29 PROCEDURE — 99204 OFFICE O/P NEW MOD 45 MIN: CPT | Performed by: PHYSICIAN ASSISTANT

## 2021-07-29 NOTE — PROGRESS NOTES
Assessment/Plan:    Severe obesity (BMI 35 0-39  9) with comorbidity ([facility] )  -Discussed options of HealthyCORE-Intensive Lifestyle Intervention Program, Very Low Calorie Diet-VLCD and Conservative Program and the role of weight loss medications  Currently not a candidate for bariatric surgery  -Initial weight loss goal of 5-10% weight loss for improved health  -Screening labs: reviewed  -Patient is interested in pursuing conservative program    MARRY (obstructive sleep apnea)  -intolerant of Tx  -risks disussed  -may improve with significant weight loss    Chronic systolic CHF (congestive heart failure) ([facility])  Wt Readings from Last 3 Encounters:   07/29/21 126 kg (278 lb 3 2 oz)   07/28/21 127 kg (279 lb)   07/20/21 127 kg (281 lb)   -NICM  -followed by cardiology  -on Lasix, ENtresto, Toprol   -reports fluid restriction of 2L per day but does not follow this    Hypothyroidism  -on Levothyroxine  -last TSH mildly elevated  -management per PCP    Prediabetes  -HgbA1c 5 8  -avoidance of refined carbohydrates  -can improve with weight loss  -can consider Metformin    Anxiety and depression  -On Lamictal, Seroquel and Klonopin    Moderate COPD (chronic obstructive pulmonary disease) ([facility] )  -Managed by pulm  -currently smoking  -on Eliquis due to recent PA  -weight loss may help improve SOB    Goals:    Food log (ie ) www myfitnesspal com,sparkpeople  com,loseit com,calorieking  com,etc    No sugary beverages  At least 64oz of water daily  Increase physical activity by 10 minutes daily  6425-2219 calories per day   5-10 servings of fruits and vegetables per day      Follow up in approximately 6 weeks with Non-Surgical Physician/Advanced Practitioner  Diagnoses and all orders for this visit:    Severe obesity (BMI 35 0-39  9) with comorbidity ([facility] )    BMI 36 0-36 9,adult  -     Ambulatory referral to Weight Management    MARRY (obstructive sleep apnea)    Chronic systolic CHF (congestive heart failure) ([facility] [address] )    Hypothyroidism, unspecified type    Anxiety and depression    Moderate COPD (chronic obstructive pulmonary disease) ([facility])          Subjective:   Chief Complaint   Patient presents with   [facility] Patient - MWM consult        Patient ID: Monisha Burroughs  is a 46 y o  male with excess weight/obesity here to pursue weight managment  Past Medical History:   Diagnosis Date    Anxiety and depression     CHF (congestive heart failure) (HCC)     COPD (chronic obstructive pulmonary disease) (HCC)     Dilated cardiomyopathy (HCC)     Hypertension     MARRY (obstructive sleep apnea)          HPI:  Obesity/Excess Weight:  Severity: Severe  Onset:  Over past several years; struggles since teenage years  Modifiers: no consistent attempts at weight loss  Contributing factors: Insufficient Physical Activity  Associated symptoms: comorbid conditions, decreased mobility and inability to do certain activities    Goals: 225-250 lbs  Highest weight: 290 lbs    Hydration: water ~64oz, 8 cups of coffee + whole milk + sugar, diet iced tea, SF powerade  Exercise: denies  Occupation: on disability  SLeep: ~5 hours per night  ETOH: 3-4 12oz cans of beer  Smoking: + 1 pack to 1 5 packs per day    Colonoscopy: never completed; advise to follow up with PCP  Previously referred to gastro    The following portions of the patient's history were reviewed and updated as appropriate: allergies, current medications, past family history, past medical history, past social history, past surgical history and problem list     Review of Systems   Constitutional: Negative for chills and fever  HENT: Negative for sore throat  Respiratory: Positive for cough and shortness of breath  Cardiovascular: Negative for chest pain and palpitations  Gastrointestinal: Positive for abdominal pain (RLQ, intermittent, being worked up by PCP)  Negative for constipation, diarrhea, nausea and vomiting  Genitourinary: Negative for dysuria  Musculoskeletal: Negative for arthralgias  Skin: Negative for rash  Neurological: Negative for dizziness and headaches  Psychiatric/Behavioral: Negative for suicidal ideas  The patient is not nervous/anxious  -feels irritable some days       Objective:    /70   Pulse 93   Temp 98 1 °F (36 7 °C)   Ht 6' 1" (1 854 m)   Wt 126 kg (278 lb 3 2 oz)   BMI 36 70 kg/m²     Physical Exam  Vitals and nursing note reviewed  Constitutional   General appearance: Abnormal   well developed and obese  Eyes No conjunctival pallor  Ears, Nose, Mouth, and Throat Oral mucosa moist    Pulmonary   Respiratory effort: No increased work of breathing or signs of respiratory distress  Auscultation of lungs: Clear to auscultation, equal breath sounds bilaterally, no wheezes, no rales, no rhonci  Cardiovascular   Auscultation of heart: Normal rate and rhythm, normal S1 and S2, without murmurs  Examination of extremities for edema and/or varicosities: + edema LLE > RLE  Abdomen   Abdomen: Abnormal   The abdomen was obese  Bowel sounds were normal  The abdomen was soft and nontender     Musculoskeletal   Gait and station: Normal     Psychiatric   Orientation to person, place and time: Normal     Affect: appropriate

## 2021-07-29 NOTE — PATIENT INSTRUCTIONS
Goals: Food log (ie ) www myfitnesspal com,sparkpeople  com,loseit com,calorieking  com,etc    No sugary beverages  At least 64oz of water daily  Increase physical activity by 10 minutes daily     7294-8159 calories per day   5-10 servings of fruits and vegetables per day

## 2021-08-02 ENCOUNTER — TELEPHONE (OUTPATIENT)
Dept: BARIATRICS | Facility: CLINIC | Age: 51
End: 2021-08-02

## 2021-08-02 PROBLEM — E66.01 SEVERE OBESITY (BMI 35.0-39.9) WITH COMORBIDITY (HCC): Status: ACTIVE | Noted: 2021-08-02

## 2021-08-02 PROBLEM — I50.22 CHRONIC SYSTOLIC CHF (CONGESTIVE HEART FAILURE) (HCC): Status: ACTIVE | Noted: 2017-06-30

## 2021-08-02 PROBLEM — R73.03 PREDIABETES: Status: ACTIVE | Noted: 2021-08-02

## 2021-08-02 NOTE — ASSESSMENT & PLAN NOTE
-Managed by pulm  -currently smoking  -on Eliquis due to recent PA  -weight loss may help improve SOB

## 2021-08-02 NOTE — ASSESSMENT & PLAN NOTE
-HgbA1c 5 8  -avoidance of refined carbohydrates  -can improve with weight loss  -can consider Metformin

## 2021-08-02 NOTE — ASSESSMENT & PLAN NOTE
-Discussed options of HealthyCORE-Intensive Lifestyle Intervention Program, Very Low Calorie Diet-VLCD and Conservative Program and the role of weight loss medications   Currently not a candidate for bariatric surgery  -Initial weight loss goal of 5-10% weight loss for improved health  -Screening labs: reviewed  -Patient is interested in pursuing conservative program Pt advised, expressed understanding. She states she doesn't have Hx of kidney stones, but her father does.      Pt has stone and will drop off for stone to be sent to lab for typing

## 2021-08-02 NOTE — ASSESSMENT & PLAN NOTE
Wt Readings from Last 3 Encounters:   07/29/21 126 kg (278 lb 3 2 oz)   07/28/21 127 kg (279 lb)   07/20/21 127 kg (281 lb)   -NICM  -followed by cardiology  -on Lasix, ENtresto, Toprol   -reports fluid restriction of 2L per day but does not follow this

## 2021-08-03 NOTE — PROGRESS NOTES
Heart Failure Outpatient Progress Note - Denise Crook 46 y o  male MRN: [id number]    @ Encounter: [id number]      Assessment/Plan:    Patient Active Problem List    Diagnosis Date Noted    Atherosclerosis of native arteries of right leg with ulceration of other part of foot ([place] [id number] ) 03/22/2018    CHF, chronic ([place] [id number] ) 06/28/2017    HTN (hypertension) 06/28/2017    Alcohol abuse 06/28/2017    Tobacco abuse 06/28/2017    Severe obesity (BMI 35 0-39  9) with comorbidity ([place] [id number] ) 08/02/2021    Prediabetes 08/02/2021    Hypothyroidism 07/26/2021    Acute saddle pulmonary embolism without acute cor pulmonale ([place] [id number] ) 07/16/2021    Noncompliance with medication regimen 07/16/2021    Moderate COPD (chronic obstructive pulmonary disease) ([facility])     Anxiety and depression     MARRY (obstructive sleep apnea)     Peripheral arterial disease ([place] [id number] ) 02/20/2018    LBBB (left bundle branch block) 08/09/2017    Chronic systolic CHF (congestive heart failure) ([place] [id number] ) 06/30/2017       1   Chronic Systolic CHF, Stage C, NYHA 2  Etiology: DNICM, possible ETOH- still drinking 4-5 beers a day  Recent hospital decompensation 10/1- up 15 lbs due to not taking lasix    Weight: baseline weight 265 lbs (admitted 10/1 at 280 lbs)- 270 lbs on DC  281 lbs today in office (294 lbs on 2 9)  NT proBNP: 10/1/20: 2075  7/6/20: 734    Studies- personally reviewed by me  Echo 7/16/21: done at time of acute PE  LVEF: 35%  LVEDd: 6 3 cm  RV: normal size, mildly reduced- no sign RV strain    Echo 11/23/20:  LVEF: 30%  LVEDd: 7 2 cm  Mild MR    Echo 10/10/19:  LVEF: 25%, mild LVH  LVEDd: 7 4 cm  RV: normal  MV: moderate MR     Echo 1/8/18: EF: 25%,LVEDd: 6 3 cm  Echo 9/27/17: EF: 10%, LVEDd: 6 75 cm     LHC 6/29/17 negative for obstructive CAD, LVEDP 30 mmhg    Neurohormonal Blockade:  --Beta-Blocker: Toprol XL 50 mg BID  --ACEi, ARB or ARNi: entresto 24/26 mg BID  --Aldosterone Receptor Blocker:  --Diuretic: Lasix 80 mg BID k 20 meq daily     Sudden Cardiac Death Risk Reduction:  --ICD: 10/4/17- Jan 4 interrogation: 4 sec of VT/VR at 290 bpm- terminated on own     Electrical Resynchronization:  Native  msec  --BiV placed in 10/4/17-   Positive response: EF: to 25-30% and LVEDd down to 6 3 from 6 8 with BiV pacer  Interrogation 6/3/20: 98%  17 VT - NSVT longest 14 beats, 1 AT/ AF which was PAT     Advanced therapies  Drinking and  Smoking     # s/p Acute saddle pulmonary embolism and LLE DVT  CTA Chest 7/16/21: Extensive pulmonary emboli including saddle emboli and clot extending throughout the lungs  LE duplex 7/16/21: acute non occlusive DVT LLE  Echo 7/16/21: no sign RV strain  #  Etoh abuse history- about 4-5 beer a day  #  tobacco use- continued, did not tolerate Chantix- mood changed  # COPD  #  MARRY- cpap  # PVD- LEADs: R CFA 50-75% stenosis, LLE with no sign diz  Healed ulcers on right toes  Likely vasospasm from Buerger's dz  # lipids 7/6/20 , HDL 48  # depression- on wellbutrin and Klonopin     TODAY'S PLAN:  Continue eliquis  Discussed importance of sticking to HF regimen for long term outcomes, success  Continue GDMT for HFrEF  Discussed negative contribution of ETOH to heart failure (volume and myocardial toxicity)  - Daily weights     HPI:   Africa Hay is a 46y o  year old male with a history of a cardiomyopathy diagnosed at [facility] in March 2017 presents from his PCP's office with chest pain  He presented to [facility] in March with progressive shortness of breath and a cough  He was found to have an elevated BNP and echo revealed an dialated cardiomyopathy with EF: 10% LVEDD: 6 6cm; with mild MR and mild TR  He was diuresed with IV lasix and started on metoprolol succinate  There was reportedly no ischemic workup done during that admission  He was fitted for a lifevest at the time of discharge  He was occasionally compliant with his lifevest but reportedly was compliant with his medications   He had a follow up echo after 3 months however he is unsure of the results but was being considered for a pacemaker  He was referred to EP at [facility] however did not continue under their care  He has been diagnosed with MARRY and is on CPAP  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  smokes 1ppd now but as much as 3ppd for the past 40 years  drinks extensively up to 1 case of beer plus hard liquor in a day  He has cut back since March  The last time he drank heavily was about a month ago  He was in the hospital in June with chest pain and cardiac cath was negative for obstructive CAD but LVEDP was elevated at 30 mmHg  He was discharged on LIfeVest  Does not want to wear  Since discharge he started feeling really bad again in the last 3 weeks again with a band sensation of chest pain  His appetite is poor and lost 37 lbs  Zero energy  Down to less than 10 cigarettes a day  Currently lives by himself  Grown adult children  Has a girlfriend that lives around 45 minutes ago  Â 10/27: RHC in Aug CI was 1 9 and PA sat 67% and wedge 24  He underwent BiV ICD placed Oct 4 ok  Drinking about 8 beers a week             Changed to [facility], was down to 3-4 beers a day    He was off his meds for many months - his decision  Started feeling very bad, then just recently went back on his meds  Lisinopril instead of Entresto  He was also drinking heavily and 3 packs a day smoking           S/p admit for acute on chronic HF, 15 lb weight gain in 2 weeks, baseline weight 265 lbs up to 280 lbs, NT proBNP 2075  Non compliant with lasix as output  Diuresed and DC at 270 lbs  Last follow up weight was up to 281 lbs     Follow up echo in Nov 2020, EF: 30%  Interval History:  Admitted July 7/16 to 7/17 with with saddle pulmonary embolism and non occlusive DVT LLE  Discharged on Eliquis  CTA Chest 7/16/21: Extensive pulmonary emboli including saddle emboli and clot extending throughout the lungs    LE duplex 7/16/21: acute non occlusive DVT LLE  Echo 7/16/21: done at time of acute PE  LVEF: 35%  LVEDd: 6 3 cm  RV: normal size, mildly reduced- no sign RV strain    Edema in bilateral lower extremities    Review of Systems   Constitutional: Negative for activity change, appetite change, fatigue and unexpected weight change  HENT: Negative for congestion and nosebleeds  Eyes: Negative  Respiratory: Negative for cough, chest tightness and shortness of breath  Cardiovascular: Positive for leg swelling  Negative for chest pain and palpitations  Gastrointestinal: Negative for abdominal distention  Endocrine: Negative  Genitourinary: Negative  Musculoskeletal: Negative  Skin: Negative  Neurological: Negative for dizziness, syncope and weakness  Hematological: Negative  Psychiatric/Behavioral: Negative  Past Medical History:   Diagnosis Date    Anxiety and depression     CHF (congestive heart failure) (HCC)     COPD (chronic obstructive pulmonary disease) (HCC)     Dilated cardiomyopathy (HCC)     Hypertension     MARRY (obstructive sleep apnea)          Allergies   Allergen Reactions    Chantix [Varenicline] Other (See Comments)     Depression       Current Outpatient Medications:     albuterol (PROVENTIL HFA,VENTOLIN HFA) 90 mcg/act inhaler, Inhale 2 puffs every 6 (six) hours as needed for wheezing or shortness of breath, Disp: 1 Inhaler, Rfl: 5    apixaban (ELIQUIS) 5 mg, Take 2 tablets (10 mg total) by mouth 2 (two) times a day for 7 days, THEN 1 tablet (5 mg total) 2 (two) times a day for 23 days  , Disp: 74 tablet, Rfl: 0    aspirin 81 mg chewable tablet, Chew 1 tablet (81 mg total) daily, Disp: 90 tablet, Rfl: 3    clonazePAM (KlonoPIN) 1 mg tablet, Take 1 tablet (1 mg total) by mouth 2 (two) times a day, Disp: 60 tablet, Rfl: 0    furosemide (LASIX) 80 mg tablet, Take 1 tablet (80 mg total) by mouth 2 (two) times a day, Disp: 180 tablet, Rfl: 0    lamoTRIgine (LaMICtal) 100 mg tablet, Take 1 tablet (100 mg total) by mouth 2 (two) times a day, Disp: 60 tablet, Rfl: 0    levothyroxine 50 mcg tablet, Take 1 tablet (50 mcg total) by mouth daily, Disp: 90 tablet, Rfl: 0    metoprolol succinate (TOPROL-XL) 100 mg 24 hr tablet, Take 1 tablet (100 mg total) by mouth 2 (two) times a day, Disp: 180 tablet, Rfl: 3    nitroglycerin (NITROSTAT) 0 4 mg SL tablet, Place 1 tablet (0 4 mg total) under the tongue every 5 (five) minutes as needed for chest pain, Disp: 10 tablet, Rfl: 0    potassium chloride (Klor-Con M20) 20 mEq tablet, Take 1 tablet (20 mEq total) by mouth daily, Disp: 30 tablet, Rfl: 3    QUEtiapine (SEROquel) 25 mg tablet, Take 1 tablet (25 mg total) by mouth daily at bedtime, Disp: , Rfl:     sacubitril-valsartan (Entresto) 49-51 MG TABS, Take 1 tablet by mouth 2 (two) times a day, Disp: 180 tablet, Rfl: 3    Social History     Socioeconomic History    Marital status: Single     Spouse name: Not on file    Number of children: Not on file    Years of education: Not on file    Highest education level: Not on file   Occupational History    Not on file   Tobacco Use    Smoking status: Current Every Day Smoker     Packs/day: 3 00     Years: 45 00     Pack years: 135 00     Types: Cigarettes     Start date: 10    Smokeless tobacco: Former User    Tobacco comment: currently, 1ppd or less  07/28/21   Vaping Use    Vaping Use: Never used   Substance and Sexual Activity    Alcohol use: Yes     Comment: Socially, several times per week    Drug use: No    Sexual activity: Not on file   Other Topics Concern    Not on file   Social History Narrative    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, underground utilities, some asbestos     Drinks a pot of coffee a day      Social Determinants of Health     Financial Resource Strain:     Difficulty of Paying Living Expenses:    Food Insecurity:     Worried About Running Out of Food in the Last Year:     Clarisse of Food in the Last Year: Transportation Needs:     Lack of Transportation (Medical):  Lack of Transportation (Non-Medical):    Physical Activity:     Days of Exercise per Week:     Minutes of Exercise per Session:    Stress:     Feeling of Stress :    Social Connections:     Frequency of Communication with Friends and Family:     Frequency of Social Gatherings with Friends and Family:     Attends Baptism Services:     Active Member of Clubs or Organizations:     Attends Club or Organization Meetings:     Marital Status:    Intimate Partner Violence:     Fear of Current or Ex-Partner:     Emotionally Abused:     Physically Abused:     Sexually Abused:        Family History   Problem Relation Age of Onset    Diabetes Mother     Emphysema Mother     No Known Problems Father     Emphysema Maternal Aunt     Stroke Neg Hx        Physical Exam:    Vitals:         Physical Exam  Constitutional:       Appearance: He is well-developed  HENT:      Head: Normocephalic and atraumatic  Eyes:      Pupils: Pupils are equal, round, and reactive to light  Neck:      Vascular: No JVD  Cardiovascular:      Rate and Rhythm: Normal rate and regular rhythm  Heart sounds: No murmur heard  Pulmonary:      Effort: Pulmonary effort is normal  No respiratory distress  Breath sounds: Normal breath sounds  Abdominal:      General: There is no distension  Palpations: Abdomen is soft  Tenderness: There is no abdominal tenderness  Musculoskeletal:         General: Normal range of motion  Cervical back: Normal range of motion  Skin:     General: Skin is warm and dry  Findings: No rash  Neurological:      Mental Status: He is alert and oriented to person, place, and time           Labs & Results:    Lab Results   Component Value Date    SODIUM 137 07/17/2021    K 3 3 (L) 07/17/2021     07/17/2021    CO2 28 07/17/2021    BUN 13 07/17/2021    CREATININE 0 76 07/17/2021    GLUC 102 07/17/2021 CALCIUM 8 7 07/17/2021     Lab Results   Component Value Date    WBC 8 54 07/17/2021    HGB 15 7 07/17/2021    HCT 46 0 07/17/2021    MCV 95 07/17/2021     (L) 07/17/2021     Lab Results   Component Value Date    NTBNP 3,552 (H) 03/24/2021      Lab Results   Component Value Date    CHOLESTEROL 216 (H) 07/06/2020    CHOLESTEROL 204 (H) 10/23/2019    CHOLESTEROL 184 06/28/2017     Lab Results   Component Value Date    HDL 48 07/06/2020    HDL 49 10/23/2019    HDL 54 06/28/2017     Lab Results   Component Value Date    TRIG 214 (H) 07/06/2020    TRIG 206 (H) 10/23/2019    TRIG 131 06/28/2017     Lab Results   Component Value Date    NONHDLC 168 07/06/2020    [place] 155 10/23/2019       EKG personally reviewed by Sybil Ken DO  Counseling / Coordination of Care  Time spent today 25 minutes  Greater than 50% of total time was spent with the patient and / or family counseling and / or coordination of care  We went over current diagnosis, most recent studies and any changes in treatment  Thank you for the opportunity to participate in the care of this patient    CORONA REYNA [address]

## 2021-08-04 ENCOUNTER — OFFICE VISIT (OUTPATIENT)
Dept: CARDIOLOGY CLINIC | Facility: CLINIC | Age: 51
End: 2021-08-04
Payer: MEDICARE

## 2021-08-04 ENCOUNTER — IN-CLINIC DEVICE VISIT (OUTPATIENT)
Dept: CARDIOLOGY CLINIC | Facility: CLINIC | Age: 51
End: 2021-08-04
Payer: MEDICARE

## 2021-08-04 VITALS
BODY MASS INDEX: 37.31 KG/M2 | OXYGEN SATURATION: 92 % | DIASTOLIC BLOOD PRESSURE: 60 MMHG | HEART RATE: 78 BPM | WEIGHT: 281.5 LBS | SYSTOLIC BLOOD PRESSURE: 100 MMHG | HEIGHT: 73 IN

## 2021-08-04 DIAGNOSIS — I50.22 CHRONIC SYSTOLIC CONGESTIVE HEART FAILURE (HCC): Primary | ICD-10-CM

## 2021-08-04 DIAGNOSIS — I26.92 ACUTE SADDLE PULMONARY EMBOLISM WITHOUT ACUTE COR PULMONALE (HCC): ICD-10-CM

## 2021-08-04 DIAGNOSIS — Z95.810 AICD (AUTOMATIC CARDIOVERTER/DEFIBRILLATOR) PRESENT: Primary | ICD-10-CM

## 2021-08-04 DIAGNOSIS — G47.33 OSA (OBSTRUCTIVE SLEEP APNEA): ICD-10-CM

## 2021-08-04 PROCEDURE — 93284 PRGRMG EVAL IMPLANTABLE DFB: CPT | Performed by: INTERNAL MEDICINE

## 2021-08-04 PROCEDURE — 99214 OFFICE O/P EST MOD 30 MIN: CPT | Performed by: INTERNAL MEDICINE

## 2021-08-04 NOTE — PROGRESS NOTES
Results for orders placed or performed in visit on 08/04/21   Cardiac EP device report    Narrative    [employer] BIV ICD  DEVICE INTERROGATED IN THE [facility] OFFICE W/DR DUNHAM OV: BATTERY VOLTAGE NEARING MADHURI-14 MONS  WILL SCHEDULE MONTHLY BATTERY CHECKS  AP 2%  96% VSRP 3%  ALL AVAILABLE LEAD PARAMETERS WITHIN NORMAL LIMITS  1 NSVT NOTED; NO THERAPIES GIVEN  1 AT/AF NOTED (12 MINS); 0% BURDEN  OPTI-VOL WITHIN NORMAL LIMITS  INCREASE MADE TO LV PW TO PROVIDE ADEQUATE SAFETY MARGIN  NORMAL DEVICE FUNCTION  NC         Current Outpatient Medications:     albuterol (PROVENTIL HFA,VENTOLIN HFA) 90 mcg/act inhaler, Inhale 2 puffs every 6 (six) hours as needed for wheezing or shortness of breath (Patient not taking: Reported on 8/4/2021), Disp: 1 Inhaler, Rfl: 5    apixaban (ELIQUIS) 5 mg, Take 2 tablets (10 mg total) by mouth 2 (two) times a day for 7 days, THEN 1 tablet (5 mg total) 2 (two) times a day for 23 days  , Disp: 74 tablet, Rfl: 0    aspirin 81 mg chewable tablet, Chew 1 tablet (81 mg total) daily, Disp: 90 tablet, Rfl: 3    clonazePAM (KlonoPIN) 1 mg tablet, Take 1 tablet (1 mg total) by mouth 2 (two) times a day, Disp: 60 tablet, Rfl: 0    furosemide (LASIX) 80 mg tablet, Take 1 tablet (80 mg total) by mouth 2 (two) times a day, Disp: 180 tablet, Rfl: 0    lamoTRIgine (LaMICtal) 100 mg tablet, Take 1 tablet (100 mg total) by mouth 2 (two) times a day, Disp: 60 tablet, Rfl: 0    levothyroxine 50 mcg tablet, Take 1 tablet (50 mcg total) by mouth daily, Disp: 90 tablet, Rfl: 0    metoprolol succinate (TOPROL-XL) 100 mg 24 hr tablet, Take 1 tablet (100 mg total) by mouth 2 (two) times a day, Disp: 180 tablet, Rfl: 3    nitroglycerin (NITROSTAT) 0 4 mg SL tablet, Place 1 tablet (0 4 mg total) under the tongue every 5 (five) minutes as needed for chest pain (Patient not taking: Reported on 8/4/2021), Disp: 10 tablet, Rfl: 0    potassium chloride (Klor-Con M20) 20 mEq tablet, Take 1 tablet (20 mEq total) by mouth daily, Disp: 30 tablet, Rfl: 3    QUEtiapine (SEROquel) 25 mg tablet, Take 1 tablet (25 mg total) by mouth daily at bedtime, Disp: , Rfl:     sacubitril-valsartan (Entresto) 49-51 MG TABS, Take 1 tablet by mouth 2 (two) times a day, Disp: 180 tablet, Rfl: 3

## 2021-08-09 DIAGNOSIS — I26.92 ACUTE SADDLE PULMONARY EMBOLISM WITHOUT ACUTE COR PULMONALE (HCC): ICD-10-CM

## 2021-08-09 NOTE — TELEPHONE ENCOUNTER
Patient requesting refill(s) of: Eliquis 5 mg     Last filled:7/16/21 #74 x 0  Last appt:7/20/21  Next appt:8/17/21  Pharmacy: [facility]

## 2021-08-16 DIAGNOSIS — J44.9 MODERATE COPD (CHRONIC OBSTRUCTIVE PULMONARY DISEASE) (HCC): Primary | ICD-10-CM

## 2021-08-16 RX ORDER — TIOTROPIUM BROMIDE INHALATION SPRAY 3.12 UG/1
2 SPRAY, METERED RESPIRATORY (INHALATION) DAILY
Qty: 4 G | Refills: 3 | Status: SHIPPED | OUTPATIENT
Start: 2021-08-16 | End: 2021-12-14 | Stop reason: SDUPTHER

## 2021-08-17 ENCOUNTER — OFFICE VISIT (OUTPATIENT)
Dept: FAMILY MEDICINE CLINIC | Facility: CLINIC | Age: 51
End: 2021-08-17
Payer: MEDICARE

## 2021-08-17 VITALS
SYSTOLIC BLOOD PRESSURE: 118 MMHG | RESPIRATION RATE: 20 BRPM | OXYGEN SATURATION: 95 % | TEMPERATURE: 98.1 F | BODY MASS INDEX: 37.37 KG/M2 | HEIGHT: 73 IN | HEART RATE: 88 BPM | WEIGHT: 282 LBS | DIASTOLIC BLOOD PRESSURE: 68 MMHG

## 2021-08-17 DIAGNOSIS — Z12.5 SCREENING FOR PROSTATE CANCER: ICD-10-CM

## 2021-08-17 DIAGNOSIS — E03.9 HYPOTHYROIDISM, UNSPECIFIED TYPE: ICD-10-CM

## 2021-08-17 DIAGNOSIS — K76.0 HEPATIC STEATOSIS: Primary | ICD-10-CM

## 2021-08-17 DIAGNOSIS — I50.22 CHRONIC SYSTOLIC CONGESTIVE HEART FAILURE (HCC): ICD-10-CM

## 2021-08-17 DIAGNOSIS — Z11.59 ENCOUNTER FOR HEPATITIS C SCREENING TEST FOR LOW RISK PATIENT: ICD-10-CM

## 2021-08-17 DIAGNOSIS — Z11.4 SCREENING FOR HIV (HUMAN IMMUNODEFICIENCY VIRUS): ICD-10-CM

## 2021-08-17 DIAGNOSIS — E87.6 HYPOKALEMIA: ICD-10-CM

## 2021-08-17 DIAGNOSIS — R73.03 PRE-DIABETES: ICD-10-CM

## 2021-08-17 DIAGNOSIS — I26.92 ACUTE SADDLE PULMONARY EMBOLISM WITHOUT ACUTE COR PULMONALE (HCC): ICD-10-CM

## 2021-08-17 DIAGNOSIS — E78.5 HYPERLIPIDEMIA, UNSPECIFIED HYPERLIPIDEMIA TYPE: ICD-10-CM

## 2021-08-17 PROCEDURE — 99214 OFFICE O/P EST MOD 30 MIN: CPT | Performed by: FAMILY MEDICINE

## 2021-08-17 NOTE — PROGRESS NOTES
Assessment/Plan:       Problem List Items Addressed This Visit        Endocrine    Hypothyroidism    Relevant Orders    TSH, 3rd generation with Free T4 reflex       Cardiovascular and Mediastinum    CHF, chronic (HCC)    Relevant Orders    Lipid Panel with Direct LDL reflex    Comprehensive metabolic panel    CBC and differential    Acute saddle pulmonary embolism without acute cor pulmonale (HCC)      Other Visit Diagnoses     Hepatic steatosis    -  Primary    Pre-diabetes        Relevant Orders    HEMOGLOBIN A1C W/ EAG ESTIMATION    Hyperlipidemia, unspecified hyperlipidemia type        Hypokalemia        Screening for prostate cancer        Relevant Orders    PSA, Total Screen    Screening for HIV (human immunodeficiency virus)        Relevant Orders    HIV 1/2 Antigen/Antibody (4th Generation) w Reflex [facility]    Encounter for hepatitis C screening test for low risk patient        Relevant Orders    Hepatitis C antibody            Subjective:      Patient ID: Millie Paulino is a 46 y o  male  HPI     Intermittent abdominal pain- US right upper quadrant showed moderate hepatic steatosis, no acute abnormality  No further abdominal pain since last visit  Follow-up PRN  Hyperlipidemia- 7/2020 Chol 216, Tri 214, HDL 48, , not currently on statin therapy  Will repeat lipid panel  Also due for repeat CMP to watch potassium  Lab Results   Component Value Date    SODIUM 137 07/17/2021    K 3 3 (L) 07/17/2021     07/17/2021    CO2 28 07/17/2021    BUN 13 07/17/2021    CREATININE 0 76 07/17/2021    GLUC 102 07/17/2021    CALCIUM 8 7 07/17/2021     CHF- Following with cardiology, weight has been stable at home, compliant with medications and taking extra potassium when he takes extra Lasix       The following portions of the patient's history were reviewed and updated as appropriate: allergies, current medications, past family history, past medical history, past social history, past surgical history, and problem list     Review of Systems   Constitutional: Negative for activity change, appetite change, chills, fatigue and fever  HENT: Negative for congestion, hearing loss and sore throat  Eyes: Negative for discharge and redness  Respiratory: Negative for cough, chest tightness, shortness of breath and wheezing  Cardiovascular: Negative for chest pain and palpitations  Gastrointestinal: Negative for abdominal pain, constipation, diarrhea, nausea and vomiting  Genitourinary: Negative for decreased urine volume and difficulty urinating  Musculoskeletal: Negative for arthralgias  Skin: Negative for rash  Neurological: Negative for dizziness, light-headedness, numbness and headaches  Psychiatric/Behavioral: Negative for dysphoric mood  The patient is not nervous/anxious  Objective:      /68 (BP Location: Left arm, Patient Position: Sitting, Cuff Size: Large)   Pulse 88   Temp 98 1 °F (36 7 °C)   Resp 20   Ht 6' 1" (1 854 m)   Wt 128 kg (282 lb)   SpO2 95%   BMI 37 21 kg/m²          Physical Exam  Vitals reviewed  Constitutional:       General: He is not in acute distress  Appearance: Normal appearance  He is not ill-appearing, toxic-appearing or diaphoretic  HENT:      Head: Normocephalic and atraumatic  Eyes:      General:         Right eye: No discharge  Left eye: No discharge  Extraocular Movements: Extraocular movements intact  Conjunctiva/sclera: Conjunctivae normal    Cardiovascular:      Rate and Rhythm: Normal rate and regular rhythm  Heart sounds: Normal heart sounds  No murmur heard  No friction rub  No gallop  Pulmonary:      Effort: Pulmonary effort is normal  No respiratory distress  Breath sounds: Normal breath sounds  No stridor  No wheezing or rhonchi  Musculoskeletal:         General: No swelling, tenderness or signs of injury  Right lower leg: Edema present  Left lower leg: Edema present        Comments: 1+ pitting edema bilaterally   Skin:     General: Skin is warm  Coloration: Skin is not pale  Findings: No erythema or rash  Neurological:      Mental Status: He is alert and oriented to person, place, and time  Motor: No weakness     Psychiatric:         Mood and Affect: Mood normal          Behavior: Behavior normal              DO Rebeka Justice [address] [place] [address] [facility]

## 2021-09-02 DIAGNOSIS — F32.A ANXIETY AND DEPRESSION: ICD-10-CM

## 2021-09-02 DIAGNOSIS — K04.7 DENTAL ABSCESS: Primary | ICD-10-CM

## 2021-09-02 DIAGNOSIS — F41.8 DEPRESSION WITH ANXIETY: ICD-10-CM

## 2021-09-02 DIAGNOSIS — F41.9 ANXIETY AND DEPRESSION: ICD-10-CM

## 2021-09-02 RX ORDER — LAMOTRIGINE 100 MG/1
100 TABLET ORAL 2 TIMES DAILY
Qty: 60 TABLET | Refills: 5 | Status: SHIPPED | OUTPATIENT
Start: 2021-09-02 | End: 2022-05-10 | Stop reason: SDUPTHER

## 2021-09-02 RX ORDER — CLONAZEPAM 1 MG/1
1 TABLET ORAL 2 TIMES DAILY
Qty: 60 TABLET | Refills: 1 | Status: SHIPPED | OUTPATIENT
Start: 2021-09-02 | End: 2021-11-03 | Stop reason: SDUPTHER

## 2021-09-03 RX ORDER — AMOXICILLIN AND CLAVULANATE POTASSIUM 875; 125 MG/1; MG/1
1 TABLET, FILM COATED ORAL EVERY 12 HOURS SCHEDULED
Qty: 14 TABLET | Refills: 0 | Status: SHIPPED | OUTPATIENT
Start: 2021-09-03 | End: 2021-09-10

## 2021-09-23 DIAGNOSIS — I42.8 NICM (NONISCHEMIC CARDIOMYOPATHY) (HCC): ICD-10-CM

## 2021-09-23 DIAGNOSIS — I42.0 DILATED CARDIOMYOPATHY (HCC): ICD-10-CM

## 2021-09-23 DIAGNOSIS — I50.42 CHRONIC COMBINED SYSTOLIC AND DIASTOLIC CONGESTIVE HEART FAILURE (HCC): ICD-10-CM

## 2021-09-23 DIAGNOSIS — I50.23 ACUTE ON CHRONIC SYSTOLIC (CONGESTIVE) HEART FAILURE (HCC): ICD-10-CM

## 2021-09-23 DIAGNOSIS — I50.22 CHRONIC SYSTOLIC CHF (CONGESTIVE HEART FAILURE), NYHA CLASS 3 (HCC): ICD-10-CM

## 2021-09-23 RX ORDER — FUROSEMIDE 80 MG
80 TABLET ORAL 2 TIMES DAILY
Qty: 180 TABLET | Refills: 3 | Status: SHIPPED | OUTPATIENT
Start: 2021-09-23

## 2021-09-23 RX ORDER — METOPROLOL SUCCINATE 100 MG/1
100 TABLET, EXTENDED RELEASE ORAL 2 TIMES DAILY
Qty: 180 TABLET | Refills: 3 | Status: SHIPPED | OUTPATIENT
Start: 2021-09-23 | End: 2022-06-08 | Stop reason: SDUPTHER

## 2021-09-23 RX ORDER — POTASSIUM CHLORIDE 20 MEQ/1
20 TABLET, EXTENDED RELEASE ORAL DAILY
Qty: 90 TABLET | Refills: 3 | Status: SHIPPED | OUTPATIENT
Start: 2021-09-23

## 2021-09-23 NOTE — TELEPHONE ENCOUNTER
Patient requesting refill(s) of: furosemide 80 mg BID    Last filled: 4/22/2021 #180 x 0      Patient requesting refill(s) of: metoprolol succinate 100 mg BID    Last filled: 3/12/2021 #180 x 3      Patient requesting refill(s) of: potassium chloride 20 mEq daily    Last filled: 3/5/2021 #30 x 3  Last appt:8/17/2021  Next appt:11/17/2021  Pharmacy: [address] [place] [address] states he recently moved to [place] and needs his scripts sent there now       Please advise

## 2021-10-02 DIAGNOSIS — E03.9 HYPOTHYROIDISM, UNSPECIFIED TYPE: ICD-10-CM

## 2021-10-04 RX ORDER — LEVOTHYROXINE SODIUM 0.05 MG/1
TABLET ORAL
Qty: 90 TABLET | Refills: 0 | Status: SHIPPED | OUTPATIENT
Start: 2021-10-04 | End: 2022-04-02 | Stop reason: SDUPTHER

## 2021-11-03 DIAGNOSIS — F41.8 DEPRESSION WITH ANXIETY: ICD-10-CM

## 2021-11-03 RX ORDER — CLONAZEPAM 1 MG/1
1 TABLET ORAL 2 TIMES DAILY
Qty: 60 TABLET | Refills: 1 | Status: SHIPPED | OUTPATIENT
Start: 2021-11-03 | End: 2022-01-31 | Stop reason: SDUPTHER

## 2021-11-08 ENCOUNTER — EPISODE CHANGES (OUTPATIENT)
Dept: CASE MANAGEMENT | Facility: OTHER | Age: 51
End: 2021-11-08

## 2021-11-08 ENCOUNTER — PATIENT OUTREACH (OUTPATIENT)
Dept: FAMILY MEDICINE CLINIC | Facility: CLINIC | Age: 51
End: 2021-11-08

## 2021-11-09 ENCOUNTER — PATIENT OUTREACH (OUTPATIENT)
Dept: FAMILY MEDICINE CLINIC | Facility: CLINIC | Age: 51
End: 2021-11-09

## 2021-11-15 ENCOUNTER — APPOINTMENT (OUTPATIENT)
Dept: LAB | Facility: CLINIC | Age: 51
End: 2021-11-15
Payer: MEDICARE

## 2021-11-15 DIAGNOSIS — Z11.59 ENCOUNTER FOR HEPATITIS C SCREENING TEST FOR LOW RISK PATIENT: ICD-10-CM

## 2021-11-15 DIAGNOSIS — E03.9 HYPOTHYROIDISM, UNSPECIFIED TYPE: ICD-10-CM

## 2021-11-15 DIAGNOSIS — F41.8 DEPRESSION WITH ANXIETY: ICD-10-CM

## 2021-11-15 DIAGNOSIS — R73.03 PRE-DIABETES: ICD-10-CM

## 2021-11-15 DIAGNOSIS — I50.22 CHRONIC SYSTOLIC CONGESTIVE HEART FAILURE (HCC): ICD-10-CM

## 2021-11-15 DIAGNOSIS — Z11.4 SCREENING FOR HIV (HUMAN IMMUNODEFICIENCY VIRUS): ICD-10-CM

## 2021-11-15 DIAGNOSIS — Z12.5 SCREENING FOR PROSTATE CANCER: ICD-10-CM

## 2021-11-15 LAB
ALBUMIN SERPL BCP-MCNC: 3.6 G/DL (ref 3.5–5)
ALP SERPL-CCNC: 89 U/L (ref 46–116)
ALT SERPL W P-5'-P-CCNC: 40 U/L (ref 12–78)
ANION GAP SERPL CALCULATED.3IONS-SCNC: 8 MMOL/L (ref 4–13)
AST SERPL W P-5'-P-CCNC: 26 U/L (ref 5–45)
BASOPHILS # BLD AUTO: 0.03 THOUSANDS/ΜL (ref 0–0.1)
BASOPHILS NFR BLD AUTO: 0 % (ref 0–1)
BILIRUB SERPL-MCNC: 0.66 MG/DL (ref 0.2–1)
BUN SERPL-MCNC: 7 MG/DL (ref 5–25)
CALCIUM SERPL-MCNC: 9.2 MG/DL (ref 8.3–10.1)
CHLORIDE SERPL-SCNC: 96 MMOL/L (ref 100–108)
CHOLEST SERPL-MCNC: 190 MG/DL (ref 50–200)
CO2 SERPL-SCNC: 32 MMOL/L (ref 21–32)
CREAT SERPL-MCNC: 1.26 MG/DL (ref 0.6–1.3)
EOSINOPHIL # BLD AUTO: 0.13 THOUSAND/ΜL (ref 0–0.61)
EOSINOPHIL NFR BLD AUTO: 1 % (ref 0–6)
ERYTHROCYTE [DISTWIDTH] IN BLOOD BY AUTOMATED COUNT: 14.1 % (ref 11.6–15.1)
EST. AVERAGE GLUCOSE BLD GHB EST-MCNC: 131 MG/DL
GFR SERPL CREATININE-BSD FRML MDRD: 65 ML/MIN/1.73SQ M
GLUCOSE P FAST SERPL-MCNC: 150 MG/DL (ref 65–99)
HBA1C MFR BLD: 6.2 %
HCT VFR BLD AUTO: 55.9 % (ref 36.5–49.3)
HCV AB SER QL: NORMAL
HDLC SERPL-MCNC: 50 MG/DL
HGB BLD-MCNC: 19.2 G/DL (ref 12–17)
IMM GRANULOCYTES # BLD AUTO: 0.05 THOUSAND/UL (ref 0–0.2)
IMM GRANULOCYTES NFR BLD AUTO: 1 % (ref 0–2)
LDLC SERPL CALC-MCNC: 103 MG/DL (ref 0–100)
LYMPHOCYTES # BLD AUTO: 2.27 THOUSANDS/ΜL (ref 0.6–4.47)
LYMPHOCYTES NFR BLD AUTO: 24 % (ref 14–44)
MCH RBC QN AUTO: 33 PG (ref 26.8–34.3)
MCHC RBC AUTO-ENTMCNC: 34.3 G/DL (ref 31.4–37.4)
MCV RBC AUTO: 96 FL (ref 82–98)
MONOCYTES # BLD AUTO: 0.83 THOUSAND/ΜL (ref 0.17–1.22)
MONOCYTES NFR BLD AUTO: 9 % (ref 4–12)
NEUTROPHILS # BLD AUTO: 6.14 THOUSANDS/ΜL (ref 1.85–7.62)
NEUTS SEG NFR BLD AUTO: 65 % (ref 43–75)
NRBC BLD AUTO-RTO: 0 /100 WBCS
PLATELET # BLD AUTO: 265 THOUSANDS/UL (ref 149–390)
PMV BLD AUTO: 10.1 FL (ref 8.9–12.7)
POTASSIUM SERPL-SCNC: 4.6 MMOL/L (ref 3.5–5.3)
PROT SERPL-MCNC: 7 G/DL (ref 6.4–8.2)
PSA SERPL-MCNC: 0.6 NG/ML (ref 0–4)
RBC # BLD AUTO: 5.82 MILLION/UL (ref 3.88–5.62)
SODIUM SERPL-SCNC: 136 MMOL/L (ref 136–145)
TRIGL SERPL-MCNC: 186 MG/DL
TSH SERPL DL<=0.05 MIU/L-ACNC: 2.61 UIU/ML (ref 0.36–3.74)
WBC # BLD AUTO: 9.45 THOUSAND/UL (ref 4.31–10.16)

## 2021-11-15 PROCEDURE — 86803 HEPATITIS C AB TEST: CPT

## 2021-11-15 PROCEDURE — 85025 COMPLETE CBC W/AUTO DIFF WBC: CPT

## 2021-11-15 PROCEDURE — 84443 ASSAY THYROID STIM HORMONE: CPT

## 2021-11-15 PROCEDURE — 80061 LIPID PANEL: CPT

## 2021-11-15 PROCEDURE — 87389 HIV-1 AG W/HIV-1&-2 AB AG IA: CPT

## 2021-11-15 PROCEDURE — 36415 COLL VENOUS BLD VENIPUNCTURE: CPT

## 2021-11-15 PROCEDURE — 80053 COMPREHEN METABOLIC PANEL: CPT

## 2021-11-15 PROCEDURE — G0103 PSA SCREENING: HCPCS

## 2021-11-15 PROCEDURE — 83036 HEMOGLOBIN GLYCOSYLATED A1C: CPT

## 2021-11-16 LAB — HIV 1+2 AB+HIV1 P24 AG SERPL QL IA: NORMAL

## 2021-11-17 ENCOUNTER — OFFICE VISIT (OUTPATIENT)
Dept: FAMILY MEDICINE CLINIC | Facility: CLINIC | Age: 51
End: 2021-11-17
Payer: MEDICARE

## 2021-11-17 VITALS
RESPIRATION RATE: 20 BRPM | HEART RATE: 108 BPM | TEMPERATURE: 96.5 F | HEIGHT: 73 IN | BODY MASS INDEX: 36.58 KG/M2 | DIASTOLIC BLOOD PRESSURE: 80 MMHG | SYSTOLIC BLOOD PRESSURE: 112 MMHG | WEIGHT: 276 LBS | OXYGEN SATURATION: 98 %

## 2021-11-17 DIAGNOSIS — Z86.711 HISTORY OF PULMONARY EMBOLISM: ICD-10-CM

## 2021-11-17 DIAGNOSIS — E78.5 HYPERLIPIDEMIA, UNSPECIFIED HYPERLIPIDEMIA TYPE: ICD-10-CM

## 2021-11-17 DIAGNOSIS — E66.01 SEVERE OBESITY (BMI 35.0-39.9) WITH COMORBIDITY (HCC): Primary | ICD-10-CM

## 2021-11-17 DIAGNOSIS — Z86.718 PERSONAL HISTORY OF DVT (DEEP VEIN THROMBOSIS): ICD-10-CM

## 2021-11-17 DIAGNOSIS — N18.2 STAGE 2 CHRONIC KIDNEY DISEASE: ICD-10-CM

## 2021-11-17 DIAGNOSIS — D58.2 ELEVATED HEMOGLOBIN (HCC): ICD-10-CM

## 2021-11-17 DIAGNOSIS — I50.22 CHRONIC SYSTOLIC CHF (CONGESTIVE HEART FAILURE), NYHA CLASS 3 (HCC): ICD-10-CM

## 2021-11-17 DIAGNOSIS — Z72.0 TOBACCO USE: ICD-10-CM

## 2021-11-17 DIAGNOSIS — E03.9 HYPOTHYROIDISM, UNSPECIFIED TYPE: ICD-10-CM

## 2021-11-17 DIAGNOSIS — R73.03 PRE-DIABETES: ICD-10-CM

## 2021-11-17 DIAGNOSIS — Z23 ENCOUNTER FOR IMMUNIZATION: ICD-10-CM

## 2021-11-17 PROCEDURE — G0008 ADMIN INFLUENZA VIRUS VAC: HCPCS

## 2021-11-17 PROCEDURE — 90682 RIV4 VACC RECOMBINANT DNA IM: CPT

## 2021-11-17 PROCEDURE — 99214 OFFICE O/P EST MOD 30 MIN: CPT | Performed by: FAMILY MEDICINE

## 2021-11-20 PROBLEM — Z86.718 PERSONAL HISTORY OF DVT (DEEP VEIN THROMBOSIS): Status: ACTIVE | Noted: 2021-11-20

## 2021-11-20 PROBLEM — D58.2 ELEVATED HEMOGLOBIN (HCC): Status: ACTIVE | Noted: 2021-11-20

## 2021-11-20 PROBLEM — N18.2 STAGE 2 CHRONIC KIDNEY DISEASE: Status: ACTIVE | Noted: 2021-11-20

## 2021-11-20 PROBLEM — E78.5 HYPERLIPIDEMIA: Status: ACTIVE | Noted: 2021-11-20

## 2021-11-20 PROBLEM — Z86.711 HISTORY OF PULMONARY EMBOLISM: Status: ACTIVE | Noted: 2021-11-20

## 2021-12-03 ENCOUNTER — APPOINTMENT (OUTPATIENT)
Dept: LAB | Facility: CLINIC | Age: 51
End: 2021-12-03
Payer: MEDICARE

## 2021-12-03 DIAGNOSIS — I50.22 CHRONIC SYSTOLIC CHF (CONGESTIVE HEART FAILURE), NYHA CLASS 3 (HCC): ICD-10-CM

## 2021-12-03 LAB
ANION GAP SERPL CALCULATED.3IONS-SCNC: 11 MMOL/L (ref 4–13)
BASOPHILS # BLD AUTO: 0.08 THOUSANDS/ΜL (ref 0–0.1)
BASOPHILS NFR BLD AUTO: 1 % (ref 0–1)
BUN SERPL-MCNC: 17 MG/DL (ref 5–25)
CALCIUM SERPL-MCNC: 9.1 MG/DL (ref 8.3–10.1)
CHLORIDE SERPL-SCNC: 100 MMOL/L (ref 100–108)
CO2 SERPL-SCNC: 28 MMOL/L (ref 21–32)
CREAT SERPL-MCNC: 1.1 MG/DL (ref 0.6–1.3)
EOSINOPHIL # BLD AUTO: 0.24 THOUSAND/ΜL (ref 0–0.61)
EOSINOPHIL NFR BLD AUTO: 3 % (ref 0–6)
ERYTHROCYTE [DISTWIDTH] IN BLOOD BY AUTOMATED COUNT: 13.2 % (ref 11.6–15.1)
GFR SERPL CREATININE-BSD FRML MDRD: 76 ML/MIN/1.73SQ M
GLUCOSE SERPL-MCNC: 106 MG/DL (ref 65–140)
HCT VFR BLD AUTO: 54.4 % (ref 36.5–49.3)
HGB BLD-MCNC: 18.1 G/DL (ref 12–17)
IMM GRANULOCYTES # BLD AUTO: 0.03 THOUSAND/UL (ref 0–0.2)
IMM GRANULOCYTES NFR BLD AUTO: 0 % (ref 0–2)
LYMPHOCYTES # BLD AUTO: 2.47 THOUSANDS/ΜL (ref 0.6–4.47)
LYMPHOCYTES NFR BLD AUTO: 27 % (ref 14–44)
MCH RBC QN AUTO: 32.4 PG (ref 26.8–34.3)
MCHC RBC AUTO-ENTMCNC: 33.3 G/DL (ref 31.4–37.4)
MCV RBC AUTO: 97 FL (ref 82–98)
MONOCYTES # BLD AUTO: 0.78 THOUSAND/ΜL (ref 0.17–1.22)
MONOCYTES NFR BLD AUTO: 9 % (ref 4–12)
NEUTROPHILS # BLD AUTO: 5.56 THOUSANDS/ΜL (ref 1.85–7.62)
NEUTS SEG NFR BLD AUTO: 60 % (ref 43–75)
NRBC BLD AUTO-RTO: 0 /100 WBCS
PLATELET # BLD AUTO: 347 THOUSANDS/UL (ref 149–390)
PMV BLD AUTO: 10.4 FL (ref 8.9–12.7)
POTASSIUM SERPL-SCNC: 3.8 MMOL/L (ref 3.5–5.3)
RBC # BLD AUTO: 5.59 MILLION/UL (ref 3.88–5.62)
SODIUM SERPL-SCNC: 139 MMOL/L (ref 136–145)
WBC # BLD AUTO: 9.16 THOUSAND/UL (ref 4.31–10.16)

## 2021-12-03 PROCEDURE — 36415 COLL VENOUS BLD VENIPUNCTURE: CPT

## 2021-12-03 PROCEDURE — 85025 COMPLETE CBC W/AUTO DIFF WBC: CPT

## 2021-12-03 PROCEDURE — 80048 BASIC METABOLIC PNL TOTAL CA: CPT

## 2021-12-14 DIAGNOSIS — J44.9 MODERATE COPD (CHRONIC OBSTRUCTIVE PULMONARY DISEASE) (HCC): ICD-10-CM

## 2021-12-14 RX ORDER — TIOTROPIUM BROMIDE INHALATION SPRAY 3.12 UG/1
2 SPRAY, METERED RESPIRATORY (INHALATION) DAILY
Qty: 4 G | Refills: 3 | Status: SHIPPED | OUTPATIENT
Start: 2021-12-14 | End: 2022-07-21 | Stop reason: SDUPTHER

## 2022-01-06 ENCOUNTER — OFFICE VISIT (OUTPATIENT)
Dept: PULMONOLOGY | Facility: CLINIC | Age: 52
End: 2022-01-06
Payer: MEDICARE

## 2022-01-06 VITALS
HEIGHT: 73 IN | DIASTOLIC BLOOD PRESSURE: 58 MMHG | SYSTOLIC BLOOD PRESSURE: 100 MMHG | TEMPERATURE: 98.7 F | RESPIRATION RATE: 18 BRPM | WEIGHT: 278.65 LBS | BODY MASS INDEX: 36.93 KG/M2 | OXYGEN SATURATION: 96 % | HEART RATE: 102 BPM

## 2022-01-06 DIAGNOSIS — Z72.0 TOBACCO USE: ICD-10-CM

## 2022-01-06 DIAGNOSIS — Z86.711 HISTORY OF PULMONARY EMBOLISM: ICD-10-CM

## 2022-01-06 DIAGNOSIS — J44.9 MODERATE COPD (CHRONIC OBSTRUCTIVE PULMONARY DISEASE) (HCC): Primary | ICD-10-CM

## 2022-01-06 DIAGNOSIS — E66.01 SEVERE OBESITY (BMI 35.0-39.9) WITH COMORBIDITY (HCC): ICD-10-CM

## 2022-01-06 PROBLEM — I50.9 CHF, CHRONIC (HCC): Status: RESOLVED | Noted: 2017-06-28 | Resolved: 2022-01-06

## 2022-01-06 PROBLEM — G62.9 NEUROPATHY: Status: ACTIVE | Noted: 2021-08-15

## 2022-01-06 PROBLEM — I26.92 ACUTE SADDLE PULMONARY EMBOLISM WITHOUT ACUTE COR PULMONALE (HCC): Status: RESOLVED | Noted: 2021-07-16 | Resolved: 2022-01-06

## 2022-01-06 PROBLEM — Z91.148 NONCOMPLIANCE WITH MEDICATION REGIMEN: Status: RESOLVED | Noted: 2021-07-16 | Resolved: 2022-01-06

## 2022-01-06 PROBLEM — Z91.14 NONCOMPLIANCE WITH MEDICATION REGIMEN: Status: RESOLVED | Noted: 2021-07-16 | Resolved: 2022-01-06

## 2022-01-06 PROCEDURE — 99214 OFFICE O/P EST MOD 30 MIN: CPT | Performed by: INTERNAL MEDICINE

## 2022-01-06 NOTE — PROGRESS NOTES
Progress note - Pulmonary Medicine   Manuel Tristan 46 y o  male MRN: [id number]       Impression & Plan: Moderate COPD (chronic obstructive pulmonary disease) (HCC)  · Continues to do well on Spiriva  · Rarely requires rescue albuterol  · Continue present therapy    Tobacco use  · Still smokes a pack a day of cigarettes  · He is not ready to quit    Pulmonary Embolism July 2021  · On Eliquis   · Currently dosing is 5 mg twice daily, treatment dose for pulmonary embolism  · Completes 6 months of anticoagulation this month and would favor continuation of prophylaxis dose at 2 5 mg twice daily given risk factors   · Although he had mild right ventricular dilation, but with no evidence of pulmonary hypertension  No need for echocardiography for reassessment of the right ventricle at this time   · Will defer further echocardiography to Cardiology  · Will await upcoming cardiac consultation to ensure that he does not require a higher dose of anticoagulation from a cardiac perspective    Recommended pulmonary follow-up in 6 months  ______________________________________________________________________    HPI:    Manuel Tristan presents today for follow-up of COPD and recent pulmonary embolism in July 2021  He has been anticoagulated on Eliquis and has tolerated this well  He reports no new symptoms of DVT or PE  He has maintained therapy on Eliquis  He does have a history of congestive heart failure and is followed by Cardiology for ischemic cardiomyopathy with reduced ejection fraction  His COPD has been controlled  Unfortunately, he does continue to smoke  He takes Spiriva daily and uses the rescue inhaler if needed but has very rare use for the rescue inhaler  He has not had any new or escalating symptoms  No chest pain or pleurisy  No increase in productive cough  He is a smoker  He smokes a pack a day of cigarettes  He uses this for management of mood and anxiety    He has no interest in attempting to quit at this time  He reports no other changes in weight or appetite  He has had age-appropriate cancer screening aside from colonoscopy which he has refused to do  He has had appropriate vaccinations  He is due for his COVID-19 booster shot however    We discussed this and he is agreeable to proceeding booster vaccination    Current Medications:    Current Outpatient Medications:     albuterol (PROVENTIL HFA,VENTOLIN HFA) 90 mcg/act inhaler, Inhale 2 puffs every 6 (six) hours as needed for wheezing or shortness of breath, Disp: 1 Inhaler, Rfl: 5    apixaban (ELIQUIS) 5 mg, Take 1 tablet (5 mg total) by mouth 2 (two) times a day, Disp: 180 tablet, Rfl: 1    aspirin 81 mg chewable tablet, Chew 1 tablet (81 mg total) daily, Disp: 90 tablet, Rfl: 3    clonazePAM (KlonoPIN) 1 mg tablet, Take 1 tablet (1 mg total) by mouth 2 (two) times a day, Disp: 60 tablet, Rfl: 1    furosemide (LASIX) 80 mg tablet, Take 1 tablet (80 mg total) by mouth 2 (two) times a day, Disp: 180 tablet, Rfl: 3    lamoTRIgine (LaMICtal) 100 mg tablet, Take 1 tablet (100 mg total) by mouth 2 (two) times a day, Disp: 60 tablet, Rfl: 5    levothyroxine 50 mcg tablet, take 1 tablet by mouth once daily, Disp: 90 tablet, Rfl: 0    metoprolol succinate (TOPROL-XL) 100 mg 24 hr tablet, Take 1 tablet (100 mg total) by mouth 2 (two) times a day, Disp: 180 tablet, Rfl: 3    potassium chloride (Klor-Con M20) 20 mEq tablet, Take 1 tablet (20 mEq total) by mouth daily, Disp: 90 tablet, Rfl: 3    QUEtiapine (SEROquel) 25 mg tablet, Take 1 tablet (25 mg total) by mouth daily at bedtime (Patient taking differently: Take 25 mg by mouth daily at bedtime Only when needed ), Disp: , Rfl:     sacubitril-valsartan (Entresto) 49-51 MG TABS, Take 1 tablet by mouth 2 (two) times a day, Disp: 180 tablet, Rfl: 3    tiotropium (Spiriva Respimat) 2 5 MCG/ACT AERS inhaler, Inhale 2 puffs daily, Disp: 4 g, Rfl: 3    nitroglycerin (NITROSTAT) 0 4 mg SL tablet, Place 1 tablet (0 4 mg total) under the tongue every 5 (five) minutes as needed for chest pain (Patient not taking: Reported on 8/4/2021), Disp: 10 tablet, Rfl: 0    Review of Systems:  Aside from what is mentioned in the HPI, the review of systems is otherwise negative    Past medical history, surgical history, and family history were reviewed and updated as appropriate    Social history updates:  Social History     Tobacco Use   Smoking Status Current Every Day Smoker    Packs/day: 3 00    Years: 45 00    Pack years: 135 00    Types: Cigarettes    Start date: 1976   Smokeless Tobacco Former User    Types: Chew    Quit date: 1984   Tobacco Comment    20 cigs daily as of  01/2022       PhysicalExamination:  Vitals:   /58   Pulse 102   Temp 98 7 °F (37 1 °C)   Resp 18   Ht 6' 1" (1 854 m)   Wt 126 kg (278 lb 10 4 oz)   SpO2 96%   BMI 36 76 kg/m²   Gen:  Comfortable on room air  No conversational dyspnea  HEENT:  Conjugate gaze  sclerae anicteric  Oropharynx moist  Neck: Trachea is midline  No JVD  No adenopathy  Chest:  Symmetric chest wall excursion  Slightly distant breath sounds  There are no wheezes or crackles auscultated today  Cardiac:  Regular  no murmur  Abdomen:  Obese, benign  Extremities: No edema  Neuro:  Normal speech and mentation    Diagnostic Data:  Labs:   I personally reviewed the most recent laboratory data pertinent to today's visit    Lab Results   Component Value Date    WBC 9 16 12/03/2021    HGB 18 1 (H) 12/03/2021    HCT 54 4 (H) 12/03/2021    MCV 97 12/03/2021     12/03/2021     Lab Results   Component Value Date    SODIUM 139 12/03/2021    K 3 8 12/03/2021    CO2 28 12/03/2021     12/03/2021    BUN 17 12/03/2021    CREATININE 1 10 12/03/2021    CALCIUM 9 1 12/03/2021       Imaging:  I personally reviewed the images on the [facility] system pertinent to today's visit  Pulmonary embolism CT scan from July was viewed on the [facility] system and does show extensive bilateral PE with saddle component  Findings are also consistent with COPD and possible component of bronchiectasis  There is peribronchial thickening    Other studies:  Echocardiogram at the time of the pulmonary embolism showed ejection fraction of 35%  He did have mild right ventricular dilation    There was no evidence to suggest pulmonary hypertension however    Pebbles Aquino MD

## 2022-01-06 NOTE — ASSESSMENT & PLAN NOTE
· On Eliquis   · Currently dosing is 5 mg twice daily, treatment dose for pulmonary embolism  · Completes 6 months of anticoagulation this month and would favor continuation of prophylaxis dose at 2 5 mg twice daily given risk factors   · Although he had mild right ventricular dilation, but with no evidence of pulmonary hypertension    No need for echocardiography for reassessment of the right ventricle at this time   · Will defer further echocardiography to Cardiology  · Will await upcoming cardiac consultation to ensure that he does not require a higher dose of anticoagulation from a cardiac perspective

## 2022-01-11 ENCOUNTER — TELEPHONE (OUTPATIENT)
Dept: PSYCHIATRY | Facility: CLINIC | Age: 52
End: 2022-01-11

## 2022-01-11 ENCOUNTER — OFFICE VISIT (OUTPATIENT)
Dept: INTERNAL MEDICINE CLINIC | Facility: CLINIC | Age: 52
End: 2022-01-11
Payer: MEDICARE

## 2022-01-11 VITALS
HEIGHT: 73 IN | HEART RATE: 77 BPM | OXYGEN SATURATION: 95 % | WEIGHT: 278 LBS | BODY MASS INDEX: 36.84 KG/M2 | SYSTOLIC BLOOD PRESSURE: 140 MMHG | TEMPERATURE: 98.3 F | DIASTOLIC BLOOD PRESSURE: 86 MMHG

## 2022-01-11 DIAGNOSIS — G47.33 OSA (OBSTRUCTIVE SLEEP APNEA): ICD-10-CM

## 2022-01-11 DIAGNOSIS — G62.9 NEUROPATHY: ICD-10-CM

## 2022-01-11 DIAGNOSIS — I73.9 PERIPHERAL ARTERIAL DISEASE (HCC): ICD-10-CM

## 2022-01-11 DIAGNOSIS — I10 PRIMARY HYPERTENSION: ICD-10-CM

## 2022-01-11 DIAGNOSIS — F32.A ANXIETY AND DEPRESSION: ICD-10-CM

## 2022-01-11 DIAGNOSIS — Z72.0 TOBACCO USE: ICD-10-CM

## 2022-01-11 DIAGNOSIS — R73.03 PRE-DIABETES: ICD-10-CM

## 2022-01-11 DIAGNOSIS — J44.9 MODERATE COPD (CHRONIC OBSTRUCTIVE PULMONARY DISEASE) (HCC): Primary | ICD-10-CM

## 2022-01-11 DIAGNOSIS — E03.9 HYPOTHYROIDISM, UNSPECIFIED TYPE: ICD-10-CM

## 2022-01-11 DIAGNOSIS — B35.1 ONYCHOMYCOSIS: ICD-10-CM

## 2022-01-11 DIAGNOSIS — F41.9 ANXIETY AND DEPRESSION: ICD-10-CM

## 2022-01-11 DIAGNOSIS — Z86.711 HISTORY OF PULMONARY EMBOLISM: ICD-10-CM

## 2022-01-11 DIAGNOSIS — I50.22 CHRONIC SYSTOLIC CHF (CONGESTIVE HEART FAILURE), NYHA CLASS 3 (HCC): ICD-10-CM

## 2022-01-11 PROCEDURE — 99204 OFFICE O/P NEW MOD 45 MIN: CPT | Performed by: INTERNAL MEDICINE

## 2022-01-11 RX ORDER — GABAPENTIN 100 MG/1
100 CAPSULE ORAL 3 TIMES DAILY
Qty: 30 CAPSULE | Refills: 0 | Status: SHIPPED | OUTPATIENT
Start: 2022-01-11 | End: 2022-01-31 | Stop reason: SDUPTHER

## 2022-01-11 NOTE — ASSESSMENT & PLAN NOTE
Start gabapentin low dose at HS  Suspect symptoms are related to his blood sugars vs  PAD     Schedule with vascular and podiatry

## 2022-01-11 NOTE — ASSESSMENT & PLAN NOTE
Wt Readings from Last 3 Encounters:   01/11/22 126 kg (278 lb)   01/06/22 126 kg (278 lb 10 4 oz)   11/17/21 125 kg (276 lb)   does not monitor weight at home  Encouraged compliance with medications and daily weights  On lasix, entresto, and metoprolol  ICD in place

## 2022-01-11 NOTE — ASSESSMENT & PLAN NOTE
On eliquis  Completes 6 months of therapy the end of the month  Per pulmonary, given risk factors can continue with prophylactic dose of 2 5 mg twice daily, will await cardiac evaluation as well

## 2022-01-11 NOTE — ASSESSMENT & PLAN NOTE
Stable  On lamictal and clonazepam  Takes seroquel rarely  Schedule with psychiatry  Referral provided     He would like to try to wean off lamictal

## 2022-01-11 NOTE — PROGRESS NOTES
Assessment/Plan:    Hypothyroidism  On levothyroxine  Encouraged compliance on an empty stomach  MARRY (obstructive sleep apnea)  Did not tolerate CPAP  Moderate COPD (chronic obstructive pulmonary disease) (HCC)  Stable  On spiriva   Albuterol PRN, rare use  Sees pulmonary     Peripheral arterial disease (HCC)  No open wounds on exam today  Encouraged him to schedule follow up with vascular  HTN (hypertension)  Slightly up in office today  Encouraged compliance with medications     Chronic systolic CHF (congestive heart failure), NYHA class 3 (HCC)  Wt Readings from Last 3 Encounters:   01/11/22 126 kg (278 lb)   01/06/22 126 kg (278 lb 10 4 oz)   11/17/21 125 kg (276 lb)   does not monitor weight at home  Encouraged compliance with medications and daily weights  On lasix, entresto, and metoprolol  ICD in place  Neuropathy  Start gabapentin low dose at HS  Suspect symptoms are related to his blood sugars vs  PAD  Schedule with vascular and podiatry     Tobacco use  Not interested in cessation  Pulmonary Embolism July 2021  On eliquis  Completes 6 months of therapy the end of the month  Per pulmonary, given risk factors can continue with prophylactic dose of 2 5 mg twice daily, will await cardiac evaluation as well  Pre-diabetes  Reduce sugars and carbohydrates  Anxiety and depression  Stable  On lamictal and clonazepam  Takes seroquel rarely  Schedule with psychiatry  Referral provided  He would like to try to wean off lamictal         Diagnoses and all orders for this visit:    Moderate COPD (chronic obstructive pulmonary disease) (HCC)    Chronic systolic CHF (congestive heart failure), NYHA class 3 (HCC)    Neuropathy  -     gabapentin (Neurontin) 100 mg capsule;  Take 1 capsule (100 mg total) by mouth 3 (three) times a day    Peripheral arterial disease (HCC)    Hypothyroidism, unspecified type    Anxiety and depression  -     Ambulatory Referral to Psychiatry; Future    MARRY (obstructive sleep apnea)    Primary hypertension    Tobacco use    Pulmonary Embolism July 2021    Pre-diabetes    Onychomycosis  -     Ambulatory Referral to Podiatry; Future          Subjective:      Patient ID: Cristy Claros is a 46 y o  male  Here today to establish care  Ayo Morris recently moved to [facility] from [facility]  He has copd  His breathing has been stable  He is on spiriva  He recently saw pulmonary  He smokes 1 ppd and is not interested in quitting at this time  He is on eliquis due to PE diagnosed summer 2021  He has CHF  He does not check his weight daily  He admits to missing doses of medications at times  He denies worsening leg edema or shortness of breath  He sees cardiology  He had an ICD placed in 2017  He has been on lamictal for depression/anxiety since 2017  He denies any issues  He has not seen psychiatry  He is wondering if he might be able to wean off of this in the future  He is most concerned today about his neuropathy in his feet  It started back in October after he fell down the steps  He has a black and blue area on his right great toe, around the toenail that is still there from the fall  He has numbness and pain in his feet  Symptoms are worse at night where he gets a more painful pins and needles sensation  The following portions of the patient's history were reviewed and updated as appropriate: allergies, current medications, past family history, past medical history, past social history, past surgical history and problem list     Review of Systems   Constitutional: Negative for activity change, appetite change, fatigue and unexpected weight change  Eyes: Negative for visual disturbance  Respiratory: Negative for cough, chest tightness, shortness of breath and wheezing  Cardiovascular: Negative for chest pain, palpitations and leg swelling     Gastrointestinal: Negative for abdominal pain, constipation and diarrhea  Genitourinary: Negative for difficulty urinating  Musculoskeletal: Negative for arthralgias  Skin: Negative for rash  Neurological: Positive for numbness  Negative for dizziness, weakness, light-headedness and headaches  Psychiatric/Behavioral: Negative for decreased concentration, dysphoric mood and sleep disturbance  The patient is not nervous/anxious  Objective:      /86   Pulse 77   Temp 98 3 °F (36 8 °C)   Ht 6' 1" (1 854 m)   Wt 126 kg (278 lb)   SpO2 95%   BMI 36 68 kg/m²          Physical Exam  Vitals reviewed  Constitutional:       Appearance: He is well-developed  HENT:      Head: Normocephalic and atraumatic  Eyes:      Conjunctiva/sclera: Conjunctivae normal       Pupils: Pupils are equal, round, and reactive to light  Neck:      Thyroid: No thyromegaly  Cardiovascular:      Rate and Rhythm: Normal rate and regular rhythm  Pulses:           Dorsalis pedis pulses are 1+ on the right side and 1+ on the left side  Heart sounds: Normal heart sounds  Pulmonary:      Effort: Pulmonary effort is normal       Breath sounds: Normal breath sounds  Abdominal:      General: Bowel sounds are normal       Palpations: Abdomen is soft  Musculoskeletal:         General: Normal range of motion  Cervical back: Neck supple  Right lower leg: Edema present  Left lower leg: Edema present  Right foot: Normal range of motion  Left foot: Normal range of motion  Comments: Moderate lower extremity edema    Feet:      Right foot:      Protective Sensation: 5 sites tested  2 sites sensed  Skin integrity: Callus, dry skin and fissure present  No ulcer or blister  Toenail Condition: Right toenails are abnormally thick and ingrown  Fungal disease present  Left foot:      Protective Sensation: 5 sites tested  2 sites sensed  Skin integrity: Callus, dry skin and fissure present  No ulcer or blister        Toenail Condition: Left toenails are abnormally thick  Fungal disease present  Skin:     General: Skin is warm and dry  Neurological:      Mental Status: He is alert and oriented to person, place, and time     Psychiatric:         Behavior: Behavior normal

## 2022-01-31 DIAGNOSIS — G62.9 NEUROPATHY: ICD-10-CM

## 2022-01-31 DIAGNOSIS — F41.8 DEPRESSION WITH ANXIETY: ICD-10-CM

## 2022-01-31 RX ORDER — CLONAZEPAM 1 MG/1
1 TABLET ORAL 2 TIMES DAILY
Qty: 60 TABLET | Refills: 1 | Status: SHIPPED | OUTPATIENT
Start: 2022-01-31 | End: 2022-05-10 | Stop reason: SDUPTHER

## 2022-01-31 RX ORDER — GABAPENTIN 300 MG/1
300 CAPSULE ORAL 2 TIMES DAILY
Qty: 60 CAPSULE | Refills: 1 | Status: SHIPPED | OUTPATIENT
Start: 2022-01-31 | End: 2022-05-02 | Stop reason: SDUPTHER

## 2022-01-31 NOTE — TELEPHONE ENCOUNTER
I increased the gabapentin to 300 mg twice daily (note 300 mg tablets not 100mg)   Sent in 30 days supply with refill

## 2022-01-31 NOTE — TELEPHONE ENCOUNTER
Patient wants to know if he can increase the Gabapentin currently 100 mg       Also can he get a fill for more than 10 days

## 2022-02-09 ENCOUNTER — OFFICE VISIT (OUTPATIENT)
Dept: PODIATRY | Facility: CLINIC | Age: 52
End: 2022-02-09
Payer: MEDICARE

## 2022-02-09 VITALS
BODY MASS INDEX: 36.45 KG/M2 | DIASTOLIC BLOOD PRESSURE: 86 MMHG | HEART RATE: 90 BPM | HEIGHT: 73 IN | WEIGHT: 275 LBS | SYSTOLIC BLOOD PRESSURE: 138 MMHG

## 2022-02-09 DIAGNOSIS — Z72.0 TOBACCO USE: ICD-10-CM

## 2022-02-09 DIAGNOSIS — B35.1 ONYCHOMYCOSIS: ICD-10-CM

## 2022-02-09 DIAGNOSIS — I73.9 PERIPHERAL ARTERIAL DISEASE (HCC): Primary | ICD-10-CM

## 2022-02-09 DIAGNOSIS — G62.9 NEUROPATHY: ICD-10-CM

## 2022-02-09 PROCEDURE — 99204 OFFICE O/P NEW MOD 45 MIN: CPT | Performed by: PODIATRIST

## 2022-02-09 NOTE — PROGRESS NOTES
Assessment/Plan:         Diagnoses and all orders for this visit:    Peripheral arterial disease ([address] )  -     VAS lower limb arterial duplex, complete bilateral; Future    Onychomycosis  -     Ambulatory Referral to Podiatry    Tobacco use  -     VAS lower limb arterial duplex, complete bilateral; Future    Neuropathy  -     VAS lower limb arterial duplex, complete bilateral; Future      Diagnosis and options discussed with patient  Patient agreeable to the plan as stated below    Reviewed arterial dopplers from 2018 which did show significant disease  Given the patient is smoking heavily I recommend we repeat this study  We discussed the relationship between cigarette smoking, atherosclerotic disease, and its effects on the lower extremity  I emphasized the importance of smoking cessation     Patient has severely diminished pulses, trophic changes to skin with history of peripheral arterial disease and heavy smoking  Recommend at-risk foot care  He is at high risk for infection and nonhealing wounds in his feet  Extensive chart review performed with the patient today  Patient is still smoking heavily despite his comorbid conditions cause directly by smoking  He has little interest in smoking cessation today and understands that he is putting himself at very high risk  Regarding his fungal toenails there is very little treatment that will be effective when you combine peripheral vascular disease and smoking  Most likely his fungal toenails will be a condition he must live with  Subjective:      Patient ID: Alexandra Morin is a 46 y o  male  Patient presents with foot pain and swelling  He had blood clots last summer in his legs and was put on elequis  HE was trimming his nail last week and cut himself and "bled like a champ " He is very worried about infection and his PCP recommended he see a podiatrist      PMH: PAD, COPD, CKD  He states he is prediabetic  He still smokes 1ppd         The following portions of the patient's history were reviewed and updated as appropriate:   He  has a past medical history of Anxiety and depression, CHF (congestive heart failure) ([place] [address] ), COPD (chronic obstructive pulmonary disease) ([place] [address] ), Dilated cardiomyopathy ([place] [address] ), Hypertension, and MARRY (obstructive sleep apnea)  He   Patient Active Problem List    Diagnosis Date Noted    Stage 2 chronic kidney disease 11/20/2021    Hyperlipidemia 11/20/2021    Elevated hemoglobin ([place] [address] ) 11/20/2021    Personal history of DVT (deep vein thrombosis) 11/20/2021    Pulmonary Embolism July 2021 11/20/2021    Neuropathy 08/15/2021    Severe obesity (BMI 35 0-39  9) with comorbidity ([place] [address] ) 08/02/2021    Pre-diabetes 08/02/2021    Hypothyroidism 07/26/2021    Moderate COPD [facility]chronic obstructive pulmonary disease) [facility])     Anxiety and depression     MARRY (obstructive sleep apnea)     Atherosclerosis of native arteries of right leg with ulceration of other part of foot ([place] [address] ) 03/22/2018    Peripheral arterial disease ([place] [address] ) 02/20/2018    LBBB (left bundle branch block) 08/09/2017    Chronic systolic CHF (congestive heart failure), NYHA class 3 ([place] [address] ) 06/30/2017    HTN (hypertension) 06/28/2017    Alcohol abuse 06/28/2017    Tobacco use 06/28/2017     He  has a past surgical history that includes Cardiac pacemaker placement (10/04/2017) and Bronchoscopy (03/10/2017)  His family history includes Diabetes in his mother; Emphysema in his maternal aunt and mother; No Known Problems in his father  He  reports that he has been smoking cigarettes  He started smoking about 46 years ago  He has a 135 00 pack-year smoking history  He quit smokeless tobacco use about 38 years ago  His smokeless tobacco use included chew  He reports current alcohol use  He reports that he does not use drugs    Current Outpatient Medications   Medication Sig Dispense Refill    albuterol (PROVENTIL HFA,VENTOLIN HFA) 90 mcg/act inhaler Inhale 2 puffs every 6 (six) hours as needed for wheezing or shortness of breath 1 Inhaler 5    apixaban (ELIQUIS) 5 mg Take 1 tablet (5 mg total) by mouth 2 (two) times a day 180 tablet 1    aspirin 81 mg chewable tablet Chew 1 tablet (81 mg total) daily 90 tablet 3    clonazePAM (KlonoPIN) 1 mg tablet Take 1 tablet (1 mg total) by mouth 2 (two) times a day 60 tablet 1    furosemide (LASIX) 80 mg tablet Take 1 tablet (80 mg total) by mouth 2 (two) times a day 180 tablet 3    gabapentin (NEURONTIN) 300 mg capsule Take 1 capsule (300 mg total) by mouth 2 (two) times a day 60 capsule 1    lamoTRIgine (LaMICtal) 100 mg tablet Take 1 tablet (100 mg total) by mouth 2 (two) times a day 60 tablet 5    levothyroxine 50 mcg tablet take 1 tablet by mouth once daily 90 tablet 0    metoprolol succinate (TOPROL-XL) 100 mg 24 hr tablet Take 1 tablet (100 mg total) by mouth 2 (two) times a day 180 tablet 3    nitroglycerin (NITROSTAT) 0 4 mg SL tablet Place 1 tablet (0 4 mg total) under the tongue every 5 (five) minutes as needed for chest pain 10 tablet 0    potassium chloride (Klor-Con M20) 20 mEq tablet Take 1 tablet (20 mEq total) by mouth daily 90 tablet 3    QUEtiapine (SEROquel) 25 mg tablet Take 1 tablet (25 mg total) by mouth daily at bedtime (Patient taking differently: Take 25 mg by mouth daily at bedtime Only when needed )      sacubitril-valsartan (Entresto) 49-51 MG TABS Take 1 tablet by mouth 2 (two) times a day 180 tablet 3    tiotropium (Spiriva Respimat) 2 5 MCG/ACT AERS inhaler Inhale 2 puffs daily 4 g 3     No current facility-administered medications for this visit       Current Outpatient Medications on File Prior to Visit   Medication Sig    albuterol (PROVENTIL HFA,VENTOLIN HFA) 90 mcg/act inhaler Inhale 2 puffs every 6 (six) hours as needed for wheezing or shortness of breath    apixaban (ELIQUIS) 5 mg Take 1 tablet (5 mg total) by mouth 2 (two) times a day    aspirin 81 mg chewable tablet Chew 1 tablet (81 mg total) daily    clonazePAM (KlonoPIN) 1 mg tablet Take 1 tablet (1 mg total) by mouth 2 (two) times a day    furosemide (LASIX) 80 mg tablet Take 1 tablet (80 mg total) by mouth 2 (two) times a day    gabapentin (NEURONTIN) 300 mg capsule Take 1 capsule (300 mg total) by mouth 2 (two) times a day    lamoTRIgine (LaMICtal) 100 mg tablet Take 1 tablet (100 mg total) by mouth 2 (two) times a day    levothyroxine 50 mcg tablet take 1 tablet by mouth once daily    metoprolol succinate (TOPROL-XL) 100 mg 24 hr tablet Take 1 tablet (100 mg total) by mouth 2 (two) times a day    nitroglycerin (NITROSTAT) 0 4 mg SL tablet Place 1 tablet (0 4 mg total) under the tongue every 5 (five) minutes as needed for chest pain    potassium chloride (Klor-Con M20) 20 mEq tablet Take 1 tablet (20 mEq total) by mouth daily    QUEtiapine (SEROquel) 25 mg tablet Take 1 tablet (25 mg total) by mouth daily at bedtime (Patient taking differently: Take 25 mg by mouth daily at bedtime Only when needed )    sacubitril-valsartan (Entresto) 49-51 MG TABS Take 1 tablet by mouth 2 (two) times a day    tiotropium (Spiriva Respimat) 2 5 MCG/ACT AERS inhaler Inhale 2 puffs daily     No current facility-administered medications on file prior to visit  He is allergic to chantix [varenicline]       Review of Systems   Constitutional: Negative  HENT: Negative  Respiratory: Positive for cough  Negative for chest tightness and shortness of breath  Cardiovascular: Negative for chest pain and leg swelling  Gastrointestinal: Negative for diarrhea, nausea and vomiting  Musculoskeletal: Negative for arthralgias, gait problem and joint swelling  Skin: Positive for color change  Negative for rash and wound  Neurological: Positive for weakness and numbness  Tremors:      Psychiatric/Behavioral: The patient is not nervous/anxious            Objective:      /86   Pulse 90   Ht 6' 1" (1 854 m) Comment: verbal  Wt 125 kg (275 lb)   BMI 36 28 kg/m²          Physical Exam  Constitutional:       Appearance: He is obese  He is not ill-appearing or diaphoretic  HENT:      Nose: No congestion or rhinorrhea  Cardiovascular:      Pulses:           Dorsalis pedis pulses are 0 on the right side and 1+ on the left side  Posterior tibial pulses are 0 on the right side and 1+ on the left side  Pulmonary:      Effort: Pulmonary effort is normal  No respiratory distress  Musculoskeletal:         General: No deformity  Right lower leg: Edema present  Left lower leg: Edema present  Right foot: Normal range of motion  No deformity, bunion or prominent metatarsal heads  Left foot: Normal range of motion  No deformity, bunion or prominent metatarsal heads  Feet:      Right foot:      Protective Sensation: 10 sites tested  0 sites sensed  Skin integrity: Dry skin and fissure present  No ulcer or blister  Toenail Condition: Right toenails are abnormally thick  Fungal disease present  Left foot:      Protective Sensation: 10 sites tested  0 sites sensed  Skin integrity: Dry skin and fissure present  No ulcer or blister  Toenail Condition: Left toenails are abnormally thick  Fungal disease present  Skin:     Capillary Refill: Capillary refill takes 2 to 3 seconds  Findings: No erythema, lesion or rash  Neurological:      Mental Status: He is alert and oriented to person, place, and time  Sensory: Sensory deficit present  Motor: No weakness        Gait: Gait normal    Psychiatric:         Mood and Affect: Mood normal

## 2022-03-07 ENCOUNTER — HOSPITAL ENCOUNTER (OUTPATIENT)
Dept: NON INVASIVE DIAGNOSTICS | Facility: CLINIC | Age: 52
Discharge: HOME | End: 2022-03-07
Payer: MEDICARE

## 2022-03-07 DIAGNOSIS — Z72.0 TOBACCO USE: ICD-10-CM

## 2022-03-07 DIAGNOSIS — I73.9 PERIPHERAL ARTERIAL DISEASE (HCC): ICD-10-CM

## 2022-03-07 DIAGNOSIS — G62.9 NEUROPATHY: ICD-10-CM

## 2022-03-07 PROCEDURE — 93923 UPR/LXTR ART STDY 3+ LVLS: CPT

## 2022-03-07 PROCEDURE — 93925 LOWER EXTREMITY STUDY: CPT

## 2022-03-07 NOTE — PROGRESS NOTES
Heart Failure Outpatient Progress Note - Juan Fontana 46 y o  male MRN: [id number]    @ Encounter: [id number]      Assessment/Plan:    Patient Active Problem List    Diagnosis Date Noted    Chronic systolic CHF (congestive heart failure), NYHA class 3 ([place] [address] ) 06/30/2017    Stage 2 chronic kidney disease 11/20/2021    Hyperlipidemia 11/20/2021    Elevated hemoglobin ([place] [address] ) 11/20/2021    Personal history of DVT (deep vein thrombosis) 11/20/2021    Pulmonary Embolism July 2021 11/20/2021    Neuropathy 08/15/2021    Severe obesity (BMI 35 0-39  9) with comorbidity ([place] [address] ) 08/02/2021    Pre-diabetes 08/02/2021    Hypothyroidism 07/26/2021    Moderate COPD (chronic obstructive pulmonary disease) (HCC)     Anxiety and depression     MARRY (obstructive sleep apnea)     Atherosclerosis of native arteries of right leg with ulceration of other part of foot ([place] [address] ) 03/22/2018    Peripheral arterial disease ([place] [address] ) 02/20/2018    LBBB (left bundle branch block) 08/09/2017    HTN (hypertension) 06/28/2017    Alcohol abuse 06/28/2017    Tobacco use 06/28/2017       1   Chronic Systolic CHF, Stage C, NYHA 2  Etiology: DNICM, possible ETOH- still drinking 4-5 beers a day  Recent hospital decompensation 10/1- up 15 lbs due to not taking lasix    Weight: baseline weight 274 lbs- this is up a little  281 lbs today in office (294 lbs on 2 9)  NT proBNP: 10/1/20: 2075  7/6/20: 734    Studies- personally reviewed by me  Echo 7/16/21: done at time of acute PE  LVEF: 35%  LVEDd: 6 3 cm  RV: normal size, mildly reduced- no sign RV strain    Echo 11/23/20:  LVEF: 30%  LVEDd: 7 2 cm  Mild MR    Echo 10/10/19:  LVEF: 25%, mild LVH  LVEDd: 7 4 cm  RV: normal  MV: moderate MR     Echo 1/8/18: EF: 25%,LVEDd: 6 3 cm  Echo 9/27/17: EF: 10%, LVEDd: 6 75 cm     LHC 6/29/17 negative for obstructive CAD, LVEDP 30 mmhg    Neurohormonal Blockade:  --Beta-Blocker: Toprol  mg BID  --ACEi, ARB or ARNi: entresto 49/51 mg BID  --Aldosterone Receptor Blocker:  --Diuretic: Lasix 80 mg BID k 20 meq daily     Sudden Cardiac Death Risk Reduction:  --ICD: 10/4/17- Jan 4 interrogation: 4 sec of VT/VR at 290 bpm- terminated on own  Interrogation 8/4/21:  96%, 1 NSVT, no therapies, optivol normal     Electrical Resynchronization:  Native  msec  --BiV placed in 10/4/17-   Positive response: EF: to 25-30% and LVEDd down to 6 3 from 6 8 with BiV pacer  Interrogation 6/3/20: 98%  17 VT - NSVT longest 14 beats, 1 AT/ AF which was PAT     Advanced therapies  Drinking and  Smoking     # s/p Acute saddle pulmonary embolism and LLE DVT  AC: Eliquis   CTA Chest 7/16/21: Extensive pulmonary emboli including saddle emboli and clot extending throughout the lungs  LE duplex 7/16/21: acute non occlusive DVT LLE  Echo 7/16/21: no sign RV strain  #  Etoh abuse history- about 4-5 beer a day  #  tobacco use- continued, did not tolerate Chantix- mood changed  # COPD  #  MARRY- cpap  # PVD- LEADs: R CFA 50-75% stenosis, LLE with no sign diz  Healed ulcers on right toes  Likely vasospasm from Buerger's dz  LEADS 3/7/22: Right 50-75% stenosis in common femoral artery  High grade stenosis occlusion in right dorsalis pedis artery  Left: no occlusive dz  # lipids   11/15/21: Tri 186, HDL 50,   7/6/20 , HDL 48  # depression- on wellbutrin and Klonopin     TODAY'S PLAN:  Continue eliquis- has been on 8 months- given on going smoking may be best to stay on  Discussed importance of sticking to HF regimen for long term outcomes, success  Continue GDMT for HFrEF  Discussed negative contribution of ETOH to heart failure (volume and myocardial toxicity)  Still smoking  Start Jardiance 10 mg daily, may need to cut back lasix but volume overloaded today  - Daily weights     HPI:   Nannette Jaramillo is a 46y o  year old male with a history of a cardiomyopathy diagnosed at [facility] in March 2017 presents from his PCP's office with chest pain   He presented to [facility] in March with progressive shortness of breath and a cough  He was found to have an elevated BNP and echo revealed an dialated cardiomyopathy with EF: 10% LVEDD: 6 6cm; with mild MR and mild TR  He was diuresed with IV lasix and started on metoprolol succinate  There was reportedly no ischemic workup done during that admission  He was fitted for a lifevest at the time of discharge  He was occasionally compliant with his lifevest but reportedly was compliant with his medications  He had a follow up echo after 3 months however he is unsure of the results but was being considered for a pacemaker  He was referred to EP at [place] however did not continue under their care  He has been diagnosed with MARRY and is on CPAP  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  smokes 1ppd now but as much as 3ppd for the past 40 years  drinks extensively up to 1 case of beer plus hard liquor in a day  He has cut back since March  The last time he drank heavily was about a month ago  He was in the hospital in June with chest pain and cardiac cath was negative for obstructive CAD but LVEDP was elevated at 30 mmHg  He was discharged on LIfeVest  Does not want to wear  Since discharge he started feeling really bad again in the last 3 weeks again with a band sensation of chest pain  His appetite is poor and lost 37 lbs  Zero energy  Down to less than 10 cigarettes a day  Currently lives by himself  Grown adult children  Has a girlfriend that lives around 45 minutes ago  Â 10/27: RHC in Aug CI was 1 9 and PA sat 67% and wedge 24  He underwent BiV ICD placed Oct 4 ok  Drinking about 8 beers a week             Changed to Madie Salguero, was down to 3-4 beers a day    He was off his meds for many months - his decision  Started feeling very bad, then just recently went back on his meds   Lisinopril instead of Entresto  He was also drinking heavily and 3 packs a day smoking           S/p admit for acute on chronic HF, 15 lb weight gain in 2 weeks, baseline weight 265 lbs up to 280 lbs, NT proBNP 2075  Non compliant with lasix as output  Diuresed and DC at 270 lbs  Last follow up weight was up to 281 lbs     Follow up echo in Nov 2020, EF: 30%  Admitted July 7/16 to 7/17 with with saddle pulmonary embolism and non occlusive DVT LLE  Discharged on Eliquis  See testing above  Interval History:  LEADS 3/7/22: Right 50-75% stenosis in common femoral artery  High grade stenosis occlusion in right dorsalis pedis artery  Left: no occlusive dz    Weight up,  Bilateral LE edema    Review of Systems   Constitutional: Negative for activity change, appetite change, fatigue and unexpected weight change  HENT: Negative for congestion and nosebleeds  Eyes: Negative  Respiratory: Negative for cough, chest tightness and shortness of breath  Cardiovascular: Positive for leg swelling  Negative for chest pain and palpitations  Gastrointestinal: Negative for abdominal distention  Endocrine: Negative  Genitourinary: Negative  Musculoskeletal: Negative  Skin: Negative  Neurological: Negative for dizziness, syncope and weakness  Hematological: Negative  Psychiatric/Behavioral: Negative  Past Medical History:   Diagnosis Date    Anxiety and depression     CHF (congestive heart failure) (HCC)     COPD (chronic obstructive pulmonary disease) (HCC)     Dilated cardiomyopathy (HCC)     Hypertension     MARRY (obstructive sleep apnea)          Allergies   Allergen Reactions    Chantix [Varenicline] Other (See Comments)     Depression           Current Outpatient Medications:     albuterol (PROVENTIL HFA,VENTOLIN HFA) 90 mcg/act inhaler, Inhale 2 puffs every 6 (six) hours as needed for wheezing or shortness of breath, Disp: 1 Inhaler, Rfl: 5    apixaban (ELIQUIS) 5 mg, Take 1 tablet (5 mg total) by mouth 2 (two) times a day, Disp: 180 tablet, Rfl: 1    aspirin 81 mg chewable tablet, Chew 1 tablet (81 mg total) daily, Disp: 90 tablet, Rfl: 3    clonazePAM (KlonoPIN) 1 mg tablet, Take 1 tablet (1 mg total) by mouth 2 (two) times a day, Disp: 60 tablet, Rfl: 1    furosemide (LASIX) 80 mg tablet, Take 1 tablet (80 mg total) by mouth 2 (two) times a day, Disp: 180 tablet, Rfl: 3    gabapentin (NEURONTIN) 300 mg capsule, Take 1 capsule (300 mg total) by mouth 2 (two) times a day, Disp: 60 capsule, Rfl: 1    lamoTRIgine (LaMICtal) 100 mg tablet, Take 1 tablet (100 mg total) by mouth 2 (two) times a day, Disp: 60 tablet, Rfl: 5    levothyroxine 50 mcg tablet, take 1 tablet by mouth once daily, Disp: 90 tablet, Rfl: 0    metoprolol succinate (TOPROL-XL) 100 mg 24 hr tablet, Take 1 tablet (100 mg total) by mouth 2 (two) times a day, Disp: 180 tablet, Rfl: 3    nitroglycerin (NITROSTAT) 0 4 mg SL tablet, Place 1 tablet (0 4 mg total) under the tongue every 5 (five) minutes as needed for chest pain, Disp: 10 tablet, Rfl: 0    potassium chloride (Klor-Con M20) 20 mEq tablet, Take 1 tablet (20 mEq total) by mouth daily, Disp: 90 tablet, Rfl: 3    QUEtiapine (SEROquel) 25 mg tablet, Take 1 tablet (25 mg total) by mouth daily at bedtime (Patient taking differently: Take 25 mg by mouth daily at bedtime Only when needed ), Disp: , Rfl:     sacubitril-valsartan (Entresto) 49-51 MG TABS, Take 1 tablet by mouth 2 (two) times a day, Disp: 180 tablet, Rfl: 3    tiotropium (Spiriva Respimat) 2 5 MCG/ACT AERS inhaler, Inhale 2 puffs daily, Disp: 4 g, Rfl: 3    Social History     Socioeconomic History    Marital status: Single     Spouse name: Not on file    Number of children: Not on file    Years of education: Not on file    Highest education level: Not on file   Occupational History    Not on file   Tobacco Use    Smoking status: Current Every Day Smoker     Packs/day: 3 00     Years: 45 00     Pack years: 135 00     Types: Cigarettes     Start date: 10    Smokeless tobacco: Former User     Types: Chew Quit date: 44    Tobacco comment: 20 cigs daily as of  01/2022   Vaping Use    Vaping Use: Never used   Substance and Sexual Activity    Alcohol use: Yes     Comment: Socially, several times per week    Drug use: No    Sexual activity: Not on file   Other Topics Concern    Not on file   Social History Narrative    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, underground utilities, some asbestos     Drinks a pot of coffee a day      Social Determinants of Health     Financial Resource Strain: Not on file   Food Insecurity: Not on file   Transportation Needs: Not on file   Physical Activity: Not on file   Stress: Not on file   Social Connections: Not on file   Intimate Partner Violence: Not on file   Housing Stability: Not on file       Family History   Problem Relation Age of Onset    Diabetes Mother     Emphysema Mother     No Known Problems Father     Emphysema Maternal Aunt     Stroke Neg Hx        Physical Exam:    Vitals:   Vitals:    03/08/22 1615   BP: 102/60   Pulse: 101   SpO2: 99%       Physical Exam  Constitutional:       Appearance: He is well-developed  HENT:      Head: Normocephalic and atraumatic  Eyes:      Pupils: Pupils are equal, round, and reactive to light  Neck:      Vascular: No JVD  Cardiovascular:      Rate and Rhythm: Normal rate and regular rhythm  Heart sounds: No murmur heard  Pulmonary:      Effort: Pulmonary effort is normal  No respiratory distress  Breath sounds: Normal breath sounds  Abdominal:      General: There is no distension  Palpations: Abdomen is soft  Tenderness: There is no abdominal tenderness  Musculoskeletal:         General: Normal range of motion  Cervical back: Normal range of motion  Skin:     General: Skin is warm and dry  Findings: No rash  Neurological:      Mental Status: He is alert and oriented to person, place, and time           Labs & Results:    Lab Results   Component Value Date SODIUM 139 12/03/2021    K 3 8 12/03/2021     12/03/2021    CO2 28 12/03/2021    BUN 17 12/03/2021    CREATININE 1 10 12/03/2021    GLUC 106 12/03/2021    CALCIUM 9 1 12/03/2021     Lab Results   Component Value Date    WBC 9 16 12/03/2021    HGB 18 1 (H) 12/03/2021    HCT 54 4 (H) 12/03/2021    MCV 97 12/03/2021     12/03/2021     Lab Results   Component Value Date    NTBNP 3,552 (H) 03/24/2021      Lab Results   Component Value Date    CHOLESTEROL 190 11/15/2021    CHOLESTEROL 216 (H) 07/06/2020    CHOLESTEROL 204 (H) 10/23/2019     Lab Results   Component Value Date    HDL 50 11/15/2021    HDL 48 07/06/2020    HDL 49 10/23/2019     Lab Results   Component Value Date    TRIG 186 (H) 11/15/2021    TRIG 214 (H) 07/06/2020    TRIG 206 (H) 10/23/2019     Lab Results   Component Value Date    NONHDLC 168 07/06/2020    [place] 155 10/23/2019       EKG personally reviewed by Trude Hashimoto,   Counseling / Coordination of Care  Time spent today 25 minutes  Greater than 50% of total time was spent with the patient and / or family counseling and / or coordination of care  We went over current diagnosis, most recent studies and any changes in treatment      [address] [facility] PULMONARY HYPERTENSION  MEDICAL DIRECTOR OF [facility]

## 2022-03-08 ENCOUNTER — OFFICE VISIT (OUTPATIENT)
Dept: CARDIOLOGY CLINIC | Facility: CLINIC | Age: 52
End: 2022-03-08
Payer: MEDICARE

## 2022-03-08 VITALS
SYSTOLIC BLOOD PRESSURE: 102 MMHG | OXYGEN SATURATION: 99 % | HEART RATE: 101 BPM | DIASTOLIC BLOOD PRESSURE: 60 MMHG | HEIGHT: 73 IN | WEIGHT: 274.2 LBS | BODY MASS INDEX: 36.34 KG/M2

## 2022-03-08 DIAGNOSIS — F10.10 ALCOHOL ABUSE: ICD-10-CM

## 2022-03-08 DIAGNOSIS — E66.01 SEVERE OBESITY (BMI 35.0-39.9) WITH COMORBIDITY (HCC): ICD-10-CM

## 2022-03-08 DIAGNOSIS — I50.22 CHRONIC SYSTOLIC CHF (CONGESTIVE HEART FAILURE), NYHA CLASS 3 (HCC): Primary | ICD-10-CM

## 2022-03-08 DIAGNOSIS — Z86.718 PERSONAL HISTORY OF DVT (DEEP VEIN THROMBOSIS): ICD-10-CM

## 2022-03-08 DIAGNOSIS — I47.2 NSVT (NONSUSTAINED VENTRICULAR TACHYCARDIA) (HCC): ICD-10-CM

## 2022-03-08 DIAGNOSIS — Z86.711 HISTORY OF PULMONARY EMBOLISM: ICD-10-CM

## 2022-03-08 PROBLEM — I47.29 NSVT (NONSUSTAINED VENTRICULAR TACHYCARDIA): Status: ACTIVE | Noted: 2022-03-08

## 2022-03-08 PROCEDURE — 93925 LOWER EXTREMITY STUDY: CPT | Performed by: SURGERY

## 2022-03-08 PROCEDURE — 99214 OFFICE O/P EST MOD 30 MIN: CPT | Performed by: INTERNAL MEDICINE

## 2022-03-08 PROCEDURE — 93922 UPR/L XTREMITY ART 2 LEVELS: CPT | Performed by: SURGERY

## 2022-03-18 ENCOUNTER — APPOINTMENT (OUTPATIENT)
Dept: LAB | Facility: CLINIC | Age: 52
End: 2022-03-18
Payer: MEDICARE

## 2022-03-18 DIAGNOSIS — I50.22 CHRONIC SYSTOLIC CHF (CONGESTIVE HEART FAILURE), NYHA CLASS 3 (HCC): ICD-10-CM

## 2022-03-18 LAB
ANION GAP SERPL CALCULATED.3IONS-SCNC: 11 MMOL/L (ref 4–13)
BUN SERPL-MCNC: 14 MG/DL (ref 5–25)
CALCIUM SERPL-MCNC: 8.8 MG/DL (ref 8.3–10.1)
CHLORIDE SERPL-SCNC: 100 MMOL/L (ref 100–108)
CO2 SERPL-SCNC: 28 MMOL/L (ref 21–32)
CREAT SERPL-MCNC: 1.34 MG/DL (ref 0.6–1.3)
GFR SERPL CREATININE-BSD FRML MDRD: 60 ML/MIN/1.73SQ M
GLUCOSE SERPL-MCNC: 123 MG/DL (ref 65–140)
POTASSIUM SERPL-SCNC: 3.9 MMOL/L (ref 3.5–5.3)
SODIUM SERPL-SCNC: 139 MMOL/L (ref 136–145)

## 2022-03-18 PROCEDURE — 36415 COLL VENOUS BLD VENIPUNCTURE: CPT

## 2022-03-18 PROCEDURE — 80048 BASIC METABOLIC PNL TOTAL CA: CPT

## 2022-04-02 DIAGNOSIS — E03.9 HYPOTHYROIDISM, UNSPECIFIED TYPE: ICD-10-CM

## 2022-04-04 RX ORDER — LEVOTHYROXINE SODIUM 0.05 MG/1
50 TABLET ORAL DAILY
Qty: 90 TABLET | Refills: 0 | Status: SHIPPED | OUTPATIENT
Start: 2022-04-04 | End: 2022-06-29

## 2022-04-12 ENCOUNTER — OFFICE VISIT (OUTPATIENT)
Dept: INTERNAL MEDICINE CLINIC | Facility: CLINIC | Age: 52
End: 2022-04-12
Payer: MEDICARE

## 2022-04-12 VITALS
DIASTOLIC BLOOD PRESSURE: 60 MMHG | OXYGEN SATURATION: 94 % | TEMPERATURE: 97 F | WEIGHT: 272 LBS | HEIGHT: 73 IN | BODY MASS INDEX: 36.05 KG/M2 | SYSTOLIC BLOOD PRESSURE: 96 MMHG | HEART RATE: 105 BPM

## 2022-04-12 DIAGNOSIS — E03.9 HYPOTHYROIDISM, UNSPECIFIED TYPE: ICD-10-CM

## 2022-04-12 DIAGNOSIS — I10 PRIMARY HYPERTENSION: ICD-10-CM

## 2022-04-12 DIAGNOSIS — I73.9 PERIPHERAL ARTERIAL DISEASE (HCC): ICD-10-CM

## 2022-04-12 DIAGNOSIS — G47.33 OSA (OBSTRUCTIVE SLEEP APNEA): ICD-10-CM

## 2022-04-12 DIAGNOSIS — G62.9 NEUROPATHY: ICD-10-CM

## 2022-04-12 DIAGNOSIS — R73.03 PRE-DIABETES: ICD-10-CM

## 2022-04-12 DIAGNOSIS — Z72.0 TOBACCO USE: ICD-10-CM

## 2022-04-12 DIAGNOSIS — F32.A ANXIETY AND DEPRESSION: ICD-10-CM

## 2022-04-12 DIAGNOSIS — I50.22 CHRONIC SYSTOLIC CHF (CONGESTIVE HEART FAILURE), NYHA CLASS 3 (HCC): Primary | ICD-10-CM

## 2022-04-12 DIAGNOSIS — F41.9 ANXIETY AND DEPRESSION: ICD-10-CM

## 2022-04-12 DIAGNOSIS — J44.9 MODERATE COPD (CHRONIC OBSTRUCTIVE PULMONARY DISEASE) (HCC): ICD-10-CM

## 2022-04-12 DIAGNOSIS — Z86.711 HISTORY OF PULMONARY EMBOLISM: ICD-10-CM

## 2022-04-12 PROCEDURE — 99214 OFFICE O/P EST MOD 30 MIN: CPT | Performed by: NURSE PRACTITIONER

## 2022-04-12 NOTE — ASSESSMENT & PLAN NOTE
On lamictal and clonazepam    Takes seroquel most nights     Scheduled with psychiatry as he would like to try to wean off lamictal

## 2022-04-12 NOTE — PROGRESS NOTES
Assessment/Plan:    Hypothyroidism  Adequately replaced  MARRY (obstructive sleep apnea)  Not tolerant of CPAP    Moderate COPD (chronic obstructive pulmonary disease) (HCC)  Stable  Uses spiriva daily and albuterol PRN  Sees pulmonary     Peripheral arterial disease ([facility] [address] )  Sees vascular  HTN (hypertension)  Stable  Continue metoprolol    Chronic systolic CHF (congestive heart failure), NYHA class 3 (HCC)  Wt Readings from Last 3 Encounters:   04/12/22 123 kg (272 lb)   03/08/22 124 kg (274 lb 3 2 oz)   02/09/22 125 kg (275 lb)       Stable  Encouraged daily weights at home  On lasix and entresto  Has ICD  Sees cardiology  Neuropathy  On gabapentin  Tobacco use  Not interested in cessation at this time    Pulmonary Embolism July 2021  Completed 6 months of eliquis 5 mg twice daily  Now on prophylactic dose of 2 5 mg twice daily  Pre-diabetes  Check A1C  On Jardiance    Anxiety and depression  On lamictal and clonazepam    Takes seroquel most nights  Scheduled with psychiatry as he would like to try to wean off lamictal         Diagnoses and all orders for this visit:    Chronic systolic CHF (congestive heart failure), NYHA class 3 ([facility] [address] )    Primary hypertension    Hypothyroidism, unspecified type    Moderate COPD (chronic obstructive pulmonary disease) (HCC)    MARRY (obstructive sleep apnea)    Peripheral arterial disease ([facility])    Neuropathy    Anxiety and depression    Pre-diabetes  -     Hemoglobin A1C; Future  -     Basic metabolic panel; Future    Pulmonary Embolism July 2021    Tobacco use          Subjective:      Patient ID: Mark Espinosa is a 46 y o  male  Here today for follow up  Souleymane Hooks is doing well  His breathing has been stable  His breathing is worse when he doesn't use his inhalers  He denies any chest pain  He recently started on jardiance  No associated side effects  He continues to drink a 12 pack of beer per week               The following portions of the patient's history were reviewed and updated as appropriate: allergies, current medications, past family history, past medical history, past social history, past surgical history and problem list     Review of Systems   Constitutional: Negative for activity change, appetite change, fatigue and unexpected weight change  Eyes: Negative for visual disturbance  Respiratory: Negative for cough, chest tightness, shortness of breath and wheezing  Cardiovascular: Negative for chest pain, palpitations and leg swelling  Gastrointestinal: Negative for abdominal pain, constipation and diarrhea  Genitourinary: Negative for difficulty urinating  Musculoskeletal: Negative for arthralgias  Skin: Negative for rash  Neurological: Negative for dizziness, weakness, light-headedness and headaches  Psychiatric/Behavioral: Negative for decreased concentration, dysphoric mood and sleep disturbance  The patient is not nervous/anxious  Objective:      BP 96/60   Pulse 105   Temp (!) 97 °F (36 1 °C)   Ht 6' 1" (1 854 m)   Wt 123 kg (272 lb)   SpO2 94%   BMI 35 89 kg/m²          Physical Exam  Vitals reviewed  Constitutional:       Appearance: He is well-developed  HENT:      Head: Normocephalic and atraumatic  Eyes:      Conjunctiva/sclera: Conjunctivae normal    Cardiovascular:      Rate and Rhythm: Normal rate and regular rhythm  Heart sounds: Normal heart sounds  Pulmonary:      Effort: Pulmonary effort is normal       Breath sounds: Normal breath sounds  Abdominal:      General: Bowel sounds are normal       Palpations: Abdomen is soft  Musculoskeletal:         General: Normal range of motion  Cervical back: Neck supple  Skin:     General: Skin is warm and dry  Neurological:      Mental Status: He is alert and oriented to person, place, and time     Psychiatric:         Behavior: Behavior normal

## 2022-04-15 DIAGNOSIS — I26.92 ACUTE SADDLE PULMONARY EMBOLISM WITHOUT ACUTE COR PULMONALE (HCC): ICD-10-CM

## 2022-04-20 ENCOUNTER — OFFICE VISIT (OUTPATIENT)
Dept: PODIATRY | Facility: CLINIC | Age: 52
End: 2022-04-20
Payer: MEDICARE

## 2022-04-20 VITALS
BODY MASS INDEX: 36.71 KG/M2 | SYSTOLIC BLOOD PRESSURE: 96 MMHG | HEIGHT: 73 IN | DIASTOLIC BLOOD PRESSURE: 70 MMHG | WEIGHT: 277 LBS | HEART RATE: 81 BPM

## 2022-04-20 DIAGNOSIS — I73.9 PERIPHERAL ARTERIAL DISEASE (HCC): Primary | ICD-10-CM

## 2022-04-20 DIAGNOSIS — B35.1 ONYCHOMYCOSIS: ICD-10-CM

## 2022-04-20 DIAGNOSIS — G62.9 NEUROPATHY: ICD-10-CM

## 2022-04-20 PROCEDURE — 11721 DEBRIDE NAIL 6 OR MORE: CPT | Performed by: PODIATRIST

## 2022-04-20 NOTE — PROGRESS NOTES
Mark Espinosa  1970  AT RISK FOOT CARE    1  Peripheral arterial disease ([facility] [address] )     2  Onychomycosis     3  Neuropathy         Patient presents for at-risk foot care  Patient has no acute concerns today  Patient has significant lower extremity risk due to diminished pulses in the feet and trophic skin changes to the lower extremity including thick toenail, atrophic skin, and decreased hair growth  On exam patient has thickened, hypertrophic, discolored, brittle toenails with subungual debris and tenderness x10   Callus: none  Patient has diminished pedal pulses and decreased perfusion to the lower extremities  Patient has significant trophic changes to the skin including thick toenails, decreased pedal hair and atrophic skin  Today's treatment includes:  Debridement of toenails  Using nail nipper, wisam, and curette, nails were sharply debrided, reduced in thickness and length  Devitalized nail tissue and fungal debris excised and removed  Patient tolerated well  Discussed proper shoe gear, daily inspections of feet, and general foot health with patient  Patient has Q8  findings and is recommended for at risk foot care every 9-10 weeks      Patients most recent complete clinical foot exam was on: 2/9/22

## 2022-05-02 DIAGNOSIS — G62.9 NEUROPATHY: ICD-10-CM

## 2022-05-02 DIAGNOSIS — I50.22 CHRONIC SYSTOLIC CHF (CONGESTIVE HEART FAILURE), NYHA CLASS 3 (HCC): ICD-10-CM

## 2022-05-02 RX ORDER — GABAPENTIN 300 MG/1
300 CAPSULE ORAL 2 TIMES DAILY
Qty: 180 CAPSULE | Refills: 1 | Status: SHIPPED | OUTPATIENT
Start: 2022-05-02

## 2022-05-10 DIAGNOSIS — F41.9 ANXIETY AND DEPRESSION: ICD-10-CM

## 2022-05-10 DIAGNOSIS — F41.8 DEPRESSION WITH ANXIETY: ICD-10-CM

## 2022-05-10 DIAGNOSIS — F32.A ANXIETY AND DEPRESSION: ICD-10-CM

## 2022-05-10 RX ORDER — LAMOTRIGINE 100 MG/1
100 TABLET ORAL 2 TIMES DAILY
Qty: 60 TABLET | Refills: 2 | Status: SHIPPED | OUTPATIENT
Start: 2022-05-10

## 2022-05-10 RX ORDER — CLONAZEPAM 1 MG/1
1 TABLET ORAL 2 TIMES DAILY
Qty: 60 TABLET | Refills: 0 | Status: SHIPPED | OUTPATIENT
Start: 2022-05-10 | End: 2022-06-28 | Stop reason: SDUPTHER

## 2022-05-23 ENCOUNTER — REMOTE DEVICE CLINIC VISIT (OUTPATIENT)
Dept: CARDIOLOGY CLINIC | Facility: CLINIC | Age: 52
End: 2022-05-23
Payer: MEDICARE

## 2022-05-23 DIAGNOSIS — Z95.810 PRESENCE OF AUTOMATIC CARDIOVERTER/DEFIBRILLATOR (AICD): Primary | ICD-10-CM

## 2022-05-23 PROCEDURE — 93296 REM INTERROG EVL PM/IDS: CPT | Performed by: INTERNAL MEDICINE

## 2022-05-23 PROCEDURE — 93295 DEV INTERROG REMOTE 1/2/MLT: CPT | Performed by: INTERNAL MEDICINE

## 2022-05-23 NOTE — PROGRESS NOTES
Results for orders placed or performed in visit on 05/23/22   Cardiac EP device report    Narrative    [employer] BIV ICD  CARELINK TRANSMISSION: BATTERY VOLTAGE NEARING MADHURI (3 MOS)  WILL SCHEDULE MONTHLY BATTERY CHECKS  AP: 3 6%  : 93% + VSRP: 5 6%  ALL AVAILABLE LEAD PARAMETERS WITHIN NORMAL LIMITS  1 AT/AF EPISODE W/ EGM SHOWING AF, DURATION 11 HRS 44 MINS  1,546 V-SENSE EPISODES (5 9 MIN/D) W/ AVAIL MARKERS SHOWING PAT, MAX DURATION 2 MINS  AF BURDEN: 0 2%  PT TAKES ELIQUIS, METOPROLOL SUCC, ASA 81MG  EF: 35% (ECHO 7/16/21)  OPTI-VOL WITHIN NORMAL LIMITS  APPROPRIATELY FUNCTIONING BI-V ICD    [address]

## 2022-06-08 DIAGNOSIS — I42.0 DILATED CARDIOMYOPATHY (HCC): ICD-10-CM

## 2022-06-08 DIAGNOSIS — I50.42 CHRONIC COMBINED SYSTOLIC AND DIASTOLIC CONGESTIVE HEART FAILURE (HCC): ICD-10-CM

## 2022-06-08 RX ORDER — METOPROLOL SUCCINATE 100 MG/1
100 TABLET, EXTENDED RELEASE ORAL 2 TIMES DAILY
Qty: 180 TABLET | Refills: 0 | Status: SHIPPED | OUTPATIENT
Start: 2022-06-08

## 2022-06-22 ENCOUNTER — TELEPHONE (OUTPATIENT)
Dept: PSYCHIATRY | Facility: CLINIC | Age: 52
End: 2022-06-22

## 2022-06-22 ENCOUNTER — OFFICE VISIT (OUTPATIENT)
Dept: PSYCHIATRY | Facility: CLINIC | Age: 52
End: 2022-06-22

## 2022-06-22 DIAGNOSIS — R45.4 IRRITABILITY AND ANGER: Primary | ICD-10-CM

## 2022-06-22 DIAGNOSIS — F41.9 ANXIETY AND DEPRESSION: ICD-10-CM

## 2022-06-22 DIAGNOSIS — F32.A ANXIETY AND DEPRESSION: ICD-10-CM

## 2022-06-22 NOTE — PSYCH
Primo Coleman presented to intake appointment and was notably irritable and tense upon welcoming him to the office  He stated that he was "only here for one thing, my meds"  I informed Primo Coleman that in order to prescribe any psychotropic medication, a full psychiatric assessment must be completed  Upon hearing this, Primo Coleman stood up from his chair, verbally degraded this writer using vulgar language, and terminated the session within 2 minutes  As such, no medications will be dispensed and future appointments will be canceled given his failure to prioritize his mental health treatment       This note was not shared with the patient due to reasonable likelihood of causing patient harm

## 2022-06-22 NOTE — TELEPHONE ENCOUNTER
Jim Garcia presented to intake appointment and was notably irritable and tense upon welcoming him to the office  He stated that he was "only here for one thing, my meds"  I informed Jim Garcia that in order to prescribe any psychotropic medication, a full psychiatric assessment must be completed  Upon hearing this, Jim Garcia stood up from his chair, verbally degraded this writer using vulgar language, and terminated the session within 2 minutes  As such, no medications will be dispensed and future appointments will be canceled given his failure to prioritize his mental health treatment

## 2022-06-22 NOTE — TELEPHONE ENCOUNTER
Patient told writer at check out that he wanted the follow appt of 7- at 10:30 am canceled as he stated he was not coming back  Patient stated he was not happy with the Provider or the new patient appt he was here for  Patient stated he was only in with Provider for a few minutes and he did not want to continue with the appt  Writer canceled the follow up appt out of the appt desk  Patient does not have any future appts scheduled nor does he wish to schedule any

## 2022-06-23 ENCOUNTER — REMOTE DEVICE CLINIC VISIT (OUTPATIENT)
Dept: CARDIOLOGY CLINIC | Facility: CLINIC | Age: 52
End: 2022-06-23

## 2022-06-23 DIAGNOSIS — Z95.810 PRESENCE OF AUTOMATIC CARDIOVERTER/DEFIBRILLATOR (AICD): Primary | ICD-10-CM

## 2022-06-23 PROCEDURE — RECHECK: Performed by: INTERNAL MEDICINE

## 2022-06-28 DIAGNOSIS — F41.8 DEPRESSION WITH ANXIETY: ICD-10-CM

## 2022-06-28 RX ORDER — CLONAZEPAM 1 MG/1
1 TABLET ORAL 2 TIMES DAILY
Qty: 60 TABLET | Refills: 0 | Status: SHIPPED | OUTPATIENT
Start: 2022-06-28

## 2022-06-28 NOTE — TELEPHONE ENCOUNTER
PDMP does not show previous clonazepam prescriptions  Please call [facility] and confirm it was last picked up

## 2022-06-29 DIAGNOSIS — E03.9 HYPOTHYROIDISM, UNSPECIFIED TYPE: ICD-10-CM

## 2022-06-29 RX ORDER — LEVOTHYROXINE SODIUM 0.05 MG/1
TABLET ORAL
Qty: 90 TABLET | Refills: 0 | Status: SHIPPED | OUTPATIENT
Start: 2022-06-29

## 2022-07-06 ENCOUNTER — OFFICE VISIT (OUTPATIENT)
Dept: PODIATRY | Facility: CLINIC | Age: 52
End: 2022-07-06
Payer: MEDICARE

## 2022-07-06 VITALS
BODY MASS INDEX: 36.71 KG/M2 | SYSTOLIC BLOOD PRESSURE: 149 MMHG | HEART RATE: 106 BPM | HEIGHT: 73 IN | WEIGHT: 277 LBS | DIASTOLIC BLOOD PRESSURE: 52 MMHG

## 2022-07-06 DIAGNOSIS — B35.1 ONYCHOMYCOSIS: ICD-10-CM

## 2022-07-06 DIAGNOSIS — Z72.0 TOBACCO USE: ICD-10-CM

## 2022-07-06 DIAGNOSIS — G62.9 NEUROPATHY: ICD-10-CM

## 2022-07-06 DIAGNOSIS — I73.9 PERIPHERAL ARTERIAL DISEASE (HCC): Primary | ICD-10-CM

## 2022-07-06 PROCEDURE — 11721 DEBRIDE NAIL 6 OR MORE: CPT | Performed by: PODIATRIST

## 2022-07-06 NOTE — PROGRESS NOTES
Eugene Foster  1970  AT RISK FOOT CARE    1  Peripheral arterial disease ([facility] [address] )     2  Onychomycosis     3  Neuropathy     4  Tobacco use         Patient presents for at-risk foot care  Patient has no acute concerns today  Patient has significant lower extremity risk due to diminished pulses in the feet and trophic skin changes to the lower extremity including thick toenail, atrophic skin, and decreased hair growth  On exam patient has thickened, hypertrophic, discolored, brittle toenails with subungual debris and tenderness x10   Callus: none  Patient has diminished pedal pulses and decreased perfusion to the lower extremities  Patient has significant trophic changes to the skin including thick toenails, decreased pedal hair and atrophic skin  Today's treatment includes:  Debridement of toenails  Using nail nipper, wisam, and curette, nails were sharply debrided, reduced in thickness and length  Devitalized nail tissue and fungal debris excised and removed  Patient tolerated well  Discussed proper shoe gear, daily inspections of feet, and general foot health with patient  Patient has Q8  findings and is recommended for at risk foot care every 9-10 weeks      Patients most recent complete clinical foot exam was on: 2/9/2022

## 2022-07-11 ENCOUNTER — TELEPHONE (OUTPATIENT)
Dept: CARDIOLOGY CLINIC | Facility: CLINIC | Age: 52
End: 2022-07-11

## 2022-07-11 ENCOUNTER — HOSPITAL ENCOUNTER (OUTPATIENT)
Dept: CT IMAGING | Facility: HOSPITAL | Age: 52
Discharge: HOME | End: 2022-07-11
Attending: INTERNAL MEDICINE
Payer: MEDICARE

## 2022-07-11 DIAGNOSIS — I50.22 CHRONIC SYSTOLIC CHF (CONGESTIVE HEART FAILURE), NYHA CLASS 2 (HCC): Primary | ICD-10-CM

## 2022-07-11 DIAGNOSIS — J44.9 MODERATE COPD (CHRONIC OBSTRUCTIVE PULMONARY DISEASE) (HCC): ICD-10-CM

## 2022-07-11 PROCEDURE — 71250 CT THORAX DX C-: CPT

## 2022-07-11 PROCEDURE — G1004 CDSM NDSC: HCPCS

## 2022-07-11 NOTE — TELEPHONE ENCOUNTER
Patient has follow up with you on 8/18  He said he is supposed to have testing done before the office visit  I do not see any orders  Does he need an echo? Is it the ICD Analysis ordered on 3/12/21? Please advise

## 2022-07-12 ENCOUNTER — OFFICE VISIT (OUTPATIENT)
Dept: INTERNAL MEDICINE CLINIC | Facility: CLINIC | Age: 52
End: 2022-07-12
Payer: MEDICARE

## 2022-07-12 VITALS
HEIGHT: 73 IN | WEIGHT: 272 LBS | BODY MASS INDEX: 36.05 KG/M2 | SYSTOLIC BLOOD PRESSURE: 126 MMHG | DIASTOLIC BLOOD PRESSURE: 86 MMHG | OXYGEN SATURATION: 94 % | HEART RATE: 108 BPM | TEMPERATURE: 97.6 F

## 2022-07-12 DIAGNOSIS — I50.22 CHRONIC SYSTOLIC CHF (CONGESTIVE HEART FAILURE), NYHA CLASS 3 (HCC): Primary | ICD-10-CM

## 2022-07-12 DIAGNOSIS — Z00.00 MEDICARE ANNUAL WELLNESS VISIT, SUBSEQUENT: ICD-10-CM

## 2022-07-12 DIAGNOSIS — F32.A ANXIETY AND DEPRESSION: ICD-10-CM

## 2022-07-12 DIAGNOSIS — E03.9 HYPOTHYROIDISM, UNSPECIFIED TYPE: ICD-10-CM

## 2022-07-12 DIAGNOSIS — F41.9 ANXIETY AND DEPRESSION: ICD-10-CM

## 2022-07-12 DIAGNOSIS — G62.9 NEUROPATHY: ICD-10-CM

## 2022-07-12 DIAGNOSIS — F10.10 ALCOHOL ABUSE: ICD-10-CM

## 2022-07-12 DIAGNOSIS — Z86.711 HISTORY OF PULMONARY EMBOLISM: ICD-10-CM

## 2022-07-12 DIAGNOSIS — Z72.0 TOBACCO USE: ICD-10-CM

## 2022-07-12 DIAGNOSIS — E78.5 HYPERLIPIDEMIA, UNSPECIFIED HYPERLIPIDEMIA TYPE: ICD-10-CM

## 2022-07-12 DIAGNOSIS — I47.29 NSVT (NONSUSTAINED VENTRICULAR TACHYCARDIA): ICD-10-CM

## 2022-07-12 DIAGNOSIS — I10 PRIMARY HYPERTENSION: ICD-10-CM

## 2022-07-12 DIAGNOSIS — R73.03 PRE-DIABETES: ICD-10-CM

## 2022-07-12 DIAGNOSIS — J44.9 MODERATE COPD (CHRONIC OBSTRUCTIVE PULMONARY DISEASE) (HCC): ICD-10-CM

## 2022-07-12 PROCEDURE — G0438 PPPS, INITIAL VISIT: HCPCS | Performed by: NURSE PRACTITIONER

## 2022-07-12 PROCEDURE — 99214 OFFICE O/P EST MOD 30 MIN: CPT | Performed by: NURSE PRACTITIONER

## 2022-07-12 NOTE — ASSESSMENT & PLAN NOTE
Discussed weaning schedule for lamictal as patient would like to wean off and declines seeing psychiatry  Recommend taking lamictal 100 mg daily for 2 weeks then 50 mg daily for 2 weeks then stop  Call right away for any withdrawal symptoms or increased celeste/depression/anxiety  On clonazepam twice daily  Takes seroquel at HS

## 2022-07-12 NOTE — ASSESSMENT & PLAN NOTE
Original BP high, recheck acceptable  Advised to monitor blood pressure at home and call if elevated    For now continue metoprolol at current dose

## 2022-07-12 NOTE — PROGRESS NOTES
Assessment and Plan:     Problem List Items Addressed This Visit        Endocrine    Hypothyroidism     On levothyroxine  Respiratory    Moderate COPD (chronic obstructive pulmonary disease) ([facility])     Breathing has been stable  On spiriva  Uses albuterol PRN  Sees pulmonary               Cardiovascular and Mediastinum    HTN (hypertension)     Original BP high, recheck acceptable  Advised to monitor blood pressure at home and call if elevated  For now continue metoprolol at current dose           Relevant Orders    Comprehensive metabolic panel    Chronic systolic CHF (congestive heart failure), NYHA class 3 ([facility]) - Primary     Wt Readings from Last 3 Encounters:   07/12/22 123 kg (272 lb)   07/06/22 126 kg (277 lb)   04/20/22 126 kg (277 lb)       Weight stable per office reading  Encouraged daily weights at home  Has ICD  On lasix and entresto  Scheduled with cardiology this month  Echo Thursday              NSVT (nonsustained ventricular tachycardia) ([facility])     No recent symptoms  On metoprolol               Nervous and Auditory    Neuropathy     Stable  Continue gabapentin               Other    Alcohol abuse     Drinks 1-2 beer per week  Encouraged cessation  Tobacco use     Down from 3 ppd to 1 ppd  CT chest pending  Not interested in further cessation  Anxiety and depression     Discussed weaning schedule for lamictal as patient would like to wean off and declines seeing psychiatry  Recommend taking lamictal 100 mg daily for 2 weeks then 50 mg daily for 2 weeks then stop  Call right away for any withdrawal symptoms or increased celeste/depression/anxiety  On clonazepam twice daily  Takes seroquel at HS  Pre-diabetes     A1C 6 2%  On Jardiance  Reduce sugars and carbohydrates              Relevant Orders    Hemoglobin A1C    Hyperlipidemia     Check lipids           Relevant Orders    Lipid Panel with Direct LDL reflex    Pulmonary Embolism July 2021 Finished eliquis 5 mg twice daily, now on 2 5 mg twice daily for prophylaxis due to elevated risk of thrombosis  Other Visit Diagnoses     Medicare annual wellness visit, subsequent            BMI Counseling: Body mass index is 35 89 kg/m²  The BMI is above normal  Nutrition recommendations include decreasing portion sizes, encouraging healthy choices of fruits and vegetables, limiting drinks that contain sugar and moderation in carbohydrate intake  Exercise recommendations include exercising 3-5 times per week  Rationale for BMI follow-up plan is due to patient being overweight or obese  Preventive health issues were discussed with patient, and age appropriate screening tests were ordered as noted in patient's After Visit Summary  Personalized health advice and appropriate referrals for health education or preventive services given if needed, as noted in patient's After Visit Summary  History of Present Illness:     Patient presents for a Medicare Wellness Visit    Marialuisa Dobbins is here today for follow up  He is doing ok  He saw psychiatry a few weeks ago  He became angry during the appointment due to being asked to discuss his medical history  The visit was ended abruptly  He does not want to see psychiatry again  He would like to be weaned off lamictal      He does not weight himself daily  He denies chest pain or shortness of breath  He cut out processed foods daily  Eating lean proteins  His legs continue to be swollen however unchanged over the last 6 months  Patient Care Team:  Abelino Abreu as PCP - General (Internal Medicine)  Kristal Sutton MD as PCP - [address] [place] [address] (RTE)  Alyne Christen, DO Glenford Brunner, DO Rolla Barb, MD Antoinette Rubens, PA-C (Physician Assistant)     Review of Systems:     Review of Systems   Constitutional: Negative for activity change, appetite change, fatigue and unexpected weight change     Eyes: Negative for visual disturbance  Respiratory: Negative for cough, chest tightness, shortness of breath and wheezing  Cardiovascular: Positive for leg swelling  Negative for chest pain and palpitations  Gastrointestinal: Negative for abdominal pain, blood in stool, constipation and diarrhea  Genitourinary: Negative for difficulty urinating  Musculoskeletal: Negative for arthralgias  Skin: Negative for rash  Neurological: Negative for dizziness, weakness, light-headedness and headaches  Psychiatric/Behavioral: Negative for decreased concentration, dysphoric mood, sleep disturbance and suicidal ideas  The patient is not nervous/anxious  Problem List:     Patient Active Problem List   Diagnosis    HTN (hypertension)    Alcohol abuse    Tobacco use    Peripheral arterial disease (HCC)    Atherosclerosis of native arteries of right leg with ulceration of other part of foot ([place] [address] )    LBBB (left bundle branch block)    Chronic systolic CHF (congestive heart failure), NYHA class 3 [facility]    Moderate COPD (chronic obstructive pulmonary disease) [facility]    Anxiety and depression    MARRY (obstructive sleep apnea)    Hypothyroidism    Severe obesity (BMI 35 0-39  9) with comorbidity ([place] [address] )    Pre-diabetes    Stage 2 chronic kidney disease    Hyperlipidemia    Elevated hemoglobin ([facility])    Personal history of DVT (deep vein thrombosis)    Pulmonary Embolism July 2021    Neuropathy    NSVT (nonsustained ventricular tachycardia) ([facility])      Past Medical and Surgical History:     Past Medical History:   Diagnosis Date    Anxiety and depression     CHF (congestive heart failure) ([place] [address] )     COPD (chronic obstructive pulmonary disease) [facility]     Dilated cardiomyopathy ([place] [address] )     Hypertension     MARRY (obstructive sleep apnea)      Past Surgical History:   Procedure Laterality Date    BRONCHOSCOPY  03/10/2017    CARDIAC PACEMAKER PLACEMENT  10/04/2017      Family History:     Family History   Problem Relation Age of Onset    Diabetes Mother     Emphysema Mother     No Known Problems Father     Emphysema Maternal Aunt     Stroke Neg Hx       Social History:     Social History     Socioeconomic History    Marital status: Single     Spouse name: None    Number of children: None    Years of education: None    Highest education level: None   Occupational History    None   Tobacco Use    Smoking status: Current Every Day Smoker     Packs/day: 1 00     Years: 45 00     Pack years: 45 00     Types: Cigarettes     Start date: 1976    Smokeless tobacco: Former User     Types: Chew     Quit date: 1984    Tobacco comment: 20 cigs daily as of  01/2022   Vaping Use    Vaping Use: Never used   Substance and Sexual Activity    Alcohol use: Yes     Comment: Socially, several times per week    Drug use: No    Sexual activity: None   Other Topics Concern    None   Social History Narrative    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, underground utilities, some asbestos     Drinks a pot of coffee a day      Social Determinants of Health     Financial Resource Strain: Not on file   Food Insecurity: Not on file   Transportation Needs: Not on file   Physical Activity: Not on file   Stress: Not on file   Social Connections: Not on file   Intimate Partner Violence: Not on file   Housing Stability: Not on file      Medications and Allergies:     Current Outpatient Medications   Medication Sig Dispense Refill    albuterol (PROVENTIL HFA,VENTOLIN HFA) 90 mcg/act inhaler Inhale 2 puffs every 6 (six) hours as needed for wheezing or shortness of breath 1 Inhaler 5    apixaban (ELIQUIS) 2 5 mg Take 1 tablet (2 5 mg total) by mouth 2 (two) times a day 180 tablet 0    aspirin 81 mg chewable tablet Chew 1 tablet (81 mg total) daily 90 tablet 3    clonazePAM (KlonoPIN) 1 mg tablet Take 1 tablet (1 mg total) by mouth 2 (two) times a day 60 tablet 0    Empagliflozin (Jardiance) 10 MG TABS Take 1 tablet (10 mg total) by mouth every morning 90 tablet 1    furosemide (LASIX) 80 mg tablet Take 1 tablet (80 mg total) by mouth 2 (two) times a day 180 tablet 3    gabapentin (NEURONTIN) 300 mg capsule Take 1 capsule (300 mg total) by mouth 2 (two) times a day 180 capsule 1    lamoTRIgine (LaMICtal) 100 mg tablet Take 1 tablet (100 mg total) by mouth 2 (two) times a day 60 tablet 2    levothyroxine 50 mcg tablet take 1 tablet by mouth once daily 90 tablet 0    metoprolol succinate (TOPROL-XL) 100 mg 24 hr tablet Take 1 tablet (100 mg total) by mouth 2 (two) times a day 180 tablet 0    nitroglycerin (NITROSTAT) 0 4 mg SL tablet Place 1 tablet (0 4 mg total) under the tongue every 5 (five) minutes as needed for chest pain 10 tablet 0    potassium chloride (Klor-Con M20) 20 mEq tablet Take 1 tablet (20 mEq total) by mouth daily 90 tablet 3    QUEtiapine (SEROquel) 25 mg tablet Take 1 tablet (25 mg total) by mouth daily at bedtime 30 tablet 0    sacubitril-valsartan (Entresto) 49-51 MG TABS Take 1 tablet by mouth 2 (two) times a day 180 tablet 3    tiotropium (Spiriva Respimat) 2 5 MCG/ACT AERS inhaler Inhale 2 puffs daily 4 g 3     No current facility-administered medications for this visit       Allergies   Allergen Reactions    Chantix [Varenicline] Other (See Comments)     Depression      Immunizations:     Immunization History   Administered Date(s) Administered    COVID-19 MODERNA VACC 0 5 ML IM 04/21/2021, 05/20/2021    Influenza, recombinant, quadrivalent,injectable, preservative free 10/01/2020, 11/17/2021    Pneumococcal Polysaccharide PPV23 10/27/2020    Tuberculin Skin Test-PPD Intradermal 03/18/2013      Health Maintenance:         Topic Date Due    Colorectal Cancer Screening  Never done    Lung Cancer Screening  07/16/2022    HIV Screening  Completed    Hepatitis C Screening  Completed         Topic Date Due    COVID-19 Vaccine (3 - Booster for Moderna series) 10/20/2021    Pneumococcal Vaccine: Pediatrics (0 to 5 Years) and At-Risk Patients (6 to 64 Years) (2 - PCV) 10/27/2021    Influenza Vaccine (1) 09/01/2022      Medicare Screening Tests and Risk Assessments:     Delaney Mallory is here for his Subsequent Wellness visit  Last Medicare Wellness visit information reviewed, patient interviewed and updates made to the record today  Health Risk Assessment:   Patient rates overall health as good  Patient feels that their physical health rating is same  Patient is satisfied with their life  Eyesight was rated as same  Hearing was rated as same  Patient feels that their emotional and mental health rating is same  Patients states they are never, rarely angry  Patient states they are never, rarely unusually tired/fatigued  Pain experienced in the last 7 days has been none  Patient states that he has experienced no weight loss or gain in last 6 months  Fall Risk Screening: In the past year, patient has experienced: no history of falling in past year      Home Safety:  Patient does not have trouble with stairs inside or outside of their home  Patient has working smoke alarms and has working carbon monoxide detector  Home safety hazards include: none  Nutrition:   Current diet is Regular  Medications:   Patient is currently taking over-the-counter supplements  OTC medications include: see medication list  Patient is able to manage medications  Activities of Daily Living (ADLs)/Instrumental Activities of Daily Living (IADLs):   Walk and transfer into and out of bed and chair?: Yes  Dress and groom yourself?: Yes    Bathe or shower yourself?: Yes    Feed yourself?  Yes  Do your laundry/housekeeping?: Yes  Manage your money, pay your bills and track your expenses?: Yes  Make your own meals?: Yes    Do your own shopping?: Yes    Previous Hospitalizations:   Any hospitalizations or ED visits within the last 12 months?: No      Advance Care Planning:   Living will: No    Durable POA for healthcare: No      PREVENTIVE SCREENINGS Cardiovascular Screening:    General: Screening Not Indicated and History Lipid Disorder      Diabetes Screening:     General: Screening Current      Colorectal Cancer Screening:     General: Patient Declines      Prostate Cancer Screening:    General: Screening Current      Osteoporosis Screening:    General: Screening Not Indicated      Abdominal Aortic Aneurysm (AAA) Screening:    Risk factors include: tobacco use        General: Screening Current      Lung Cancer Screening:     General: Screening Current      Hepatitis C Screening:    General: Screening Current    Screening, Brief Intervention, and Referral to Treatment (SBIRT)    Screening  Typical number of drinks in a day: 0  Typical number of drinks in a week: 0  Interpretation: Low risk drinking behavior  Single Item Drug Screening:  How often have you used an illegal drug (including marijuana) or a prescription medication for non-medical reasons in the past year? never    Single Item Drug Screen Score: 0  Interpretation: Negative screen for possible drug use disorder    No exam data present     Physical Exam:     /86   Pulse (!) 108   Temp 97 6 °F (36 4 °C)   Ht 6' 1" (1 854 m)   Wt 123 kg (272 lb)   SpO2 94%   BMI 35 89 kg/m²     Physical Exam  Vitals reviewed  Constitutional:       Appearance: Normal appearance  HENT:      Head: Normocephalic and atraumatic  Eyes:      Conjunctiva/sclera: Conjunctivae normal    Cardiovascular:      Rate and Rhythm: Normal rate and regular rhythm  Heart sounds: Normal heart sounds  Pulmonary:      Effort: Pulmonary effort is normal       Breath sounds: Normal breath sounds  Abdominal:      General: Bowel sounds are normal       Palpations: Abdomen is soft  Musculoskeletal:      Cervical back: Neck supple  Right lower leg: Edema present  Left lower leg: Edema present  Comments: Moderate pitting edema  Skin:     General: Skin is warm and dry     Neurological:      Mental Status: He is alert and oriented to person, place, and time     Psychiatric:         Mood and Affect: Mood normal          Behavior: Behavior normal           SHERRY Cotton

## 2022-07-12 NOTE — ASSESSMENT & PLAN NOTE
Finished eliquis 5 mg twice daily, now on 2 5 mg twice daily for prophylaxis due to elevated risk of thrombosis

## 2022-07-12 NOTE — ASSESSMENT & PLAN NOTE
Wt Readings from Last 3 Encounters:   07/12/22 123 kg (272 lb)   07/06/22 126 kg (277 lb)   04/20/22 126 kg (277 lb)       Weight stable per office reading    Encouraged daily weights at home  Has ICD  On lasix and entresto  Scheduled with cardiology this month  Echo Thursday

## 2022-07-13 DIAGNOSIS — I26.92 ACUTE SADDLE PULMONARY EMBOLISM WITHOUT ACUTE COR PULMONALE (HCC): ICD-10-CM

## 2022-07-13 RX ORDER — APIXABAN 2.5 MG/1
TABLET, FILM COATED ORAL
Qty: 180 TABLET | Refills: 0 | Status: SHIPPED | OUTPATIENT
Start: 2022-07-13 | End: 2022-09-03 | Stop reason: SDUPTHER

## 2022-07-14 ENCOUNTER — HOSPITAL ENCOUNTER (OUTPATIENT)
Dept: NON INVASIVE DIAGNOSTICS | Facility: CLINIC | Age: 52
Discharge: HOME | End: 2022-07-14
Payer: MEDICARE

## 2022-07-14 VITALS
DIASTOLIC BLOOD PRESSURE: 86 MMHG | BODY MASS INDEX: 36.71 KG/M2 | WEIGHT: 277 LBS | SYSTOLIC BLOOD PRESSURE: 126 MMHG | HEART RATE: 100 BPM | HEIGHT: 73 IN

## 2022-07-14 DIAGNOSIS — I50.22 CHRONIC SYSTOLIC CHF (CONGESTIVE HEART FAILURE), NYHA CLASS 2 (HCC): ICD-10-CM

## 2022-07-14 LAB
AORTIC ROOT: 4.2 CM
APICAL FOUR CHAMBER EJECTION FRACTION: 46 %
ASCENDING AORTA: 3.4 CM
FRACTIONAL SHORTENING: 20 % (ref 28–44)
INTERVENTRICULAR SEPTUM IN DIASTOLE (PARASTERNAL SHORT AXIS VIEW): 1.1 CM
INTERVENTRICULAR SEPTUM: 1.1 CM (ref 0.6–1.1)
LAAS-AP2: 15.5 CM2
LAAS-AP4: 17.4 CM2
LEFT ATRIUM AREA SYSTOLE SINGLE PLANE A4C: 18.1 CM2
LEFT ATRIUM SIZE: 3.9 CM
LEFT INTERNAL DIMENSION IN SYSTOLE: 4.1 CM (ref 2.1–4)
LEFT VENTRICLE DIASTOLIC VOLUME (MOD BIPLANE): 178 ML
LEFT VENTRICLE SYSTOLIC VOLUME (MOD BIPLANE): 92 ML
LEFT VENTRICULAR INTERNAL DIMENSION IN DIASTOLE: 5.1 CM (ref 3.5–6)
LEFT VENTRICULAR POSTERIOR WALL IN END DIASTOLE: 1.1 CM
LEFT VENTRICULAR STROKE VOLUME: 48 ML
LV EF: 48 %
LVSV (TEICH): 48 ML
RIGHT ATRIUM AREA SYSTOLE A4C: 14.1 CM2
RIGHT VENTRICLE ID DIMENSION: 3.4 CM
SL CV LEFT ATRIUM LENGTH A2C: 4.6 CM
SL CV LV EF: 45
SL CV PED ECHO LEFT VENTRICLE DIASTOLIC VOLUME (MOD BIPLANE) 2D: 122 ML
SL CV PED ECHO LEFT VENTRICLE SYSTOLIC VOLUME (MOD BIPLANE) 2D: 74 ML

## 2022-07-14 PROCEDURE — 93306 TTE W/DOPPLER COMPLETE: CPT

## 2022-07-14 PROCEDURE — 93306 TTE W/DOPPLER COMPLETE: CPT | Performed by: INTERNAL MEDICINE

## 2022-07-21 ENCOUNTER — OFFICE VISIT (OUTPATIENT)
Dept: PULMONOLOGY | Facility: CLINIC | Age: 52
End: 2022-07-21
Payer: MEDICARE

## 2022-07-21 VITALS
HEIGHT: 73 IN | OXYGEN SATURATION: 98 % | DIASTOLIC BLOOD PRESSURE: 80 MMHG | SYSTOLIC BLOOD PRESSURE: 125 MMHG | HEART RATE: 112 BPM | BODY MASS INDEX: 36.05 KG/M2 | WEIGHT: 272 LBS | RESPIRATION RATE: 22 BRPM | TEMPERATURE: 99.2 F

## 2022-07-21 DIAGNOSIS — Z72.0 TOBACCO USE: Primary | ICD-10-CM

## 2022-07-21 DIAGNOSIS — J44.9 MODERATE COPD (CHRONIC OBSTRUCTIVE PULMONARY DISEASE) (HCC): ICD-10-CM

## 2022-07-21 DIAGNOSIS — I50.22 CHRONIC SYSTOLIC CHF (CONGESTIVE HEART FAILURE), NYHA CLASS 3 (HCC): ICD-10-CM

## 2022-07-21 DIAGNOSIS — G47.33 OSA (OBSTRUCTIVE SLEEP APNEA): ICD-10-CM

## 2022-07-21 PROCEDURE — 99214 OFFICE O/P EST MOD 30 MIN: CPT | Performed by: INTERNAL MEDICINE

## 2022-07-21 RX ORDER — TIOTROPIUM BROMIDE INHALATION SPRAY 3.12 UG/1
2 SPRAY, METERED RESPIRATORY (INHALATION) DAILY
Qty: 4 G | Refills: 6 | Status: SHIPPED | OUTPATIENT
Start: 2022-07-21

## 2022-07-21 NOTE — PROGRESS NOTES
Progress note - Pulmonary Medicine   Doris Skinner 46 y o  male MRN: [id number]       Impression & Plan: Moderate COPD (chronic obstructive pulmonary disease) (HCC)  · Doing well on Spiriva   · Rare need for albuterol   · Continue present therapy      MARRY (obstructive sleep apnea)  · Intolerant to CPAP and does not use any treatment    Tobacco use  · Has been cutting down on smoking but is at 1 pack daily  At peak was 3 packs per day   · He is not ready to quit entirely   · Does continue to meet lung cancer screening criteria and has pulmonary nodules  · Findings on recent CT scan are amenable to continued lung cancer screening surveillance in 1 year, July 2023    Chronic systolic CHF (congestive heart failure), NYHA class 3 ([facility])  Wt Readings from Last 3 Encounters:   07/21/22 123 kg (272 lb)   07/14/22 126 kg (277 lb)   07/12/22 123 kg (272 lb)     · Recent echocardiogram did demonstrate improvement in ejection fraction to 45% from 35%  · Continue cardiovascular follow-up        Follow-up in 6 months  ______________________________________________________________________    HPI:    Doris Skinner presents today for follow-up of moderate COPD and tobacco abuse with lung nodules  His recent screening CT scan did show stability  He does have a right basilar scar  He does continue to smoke  He is smoking a pack a day of cigarettes  He uses Spiriva daily does not have a lot of daily COPD symptoms  He gets short breath with certain activities but no worsening cough or phlegm  No wheezing  He rarely requires rescue albuterol use  He denies chest pain  He does get leg swelling  He has congestive heart failure but recently had an echo that showed improvement in his ejection fraction  He reports no other signs or symptoms of decompensation  No fever or chills  No sick contacts  No substantial weight or appetite change  He remains overweight      Current Medications:    Current Outpatient Medications:     albuterol (PROVENTIL HFA,VENTOLIN HFA) 90 mcg/act inhaler, Inhale 2 puffs every 6 (six) hours as needed for wheezing or shortness of breath, Disp: 1 Inhaler, Rfl: 5    aspirin 81 mg chewable tablet, Chew 1 tablet (81 mg total) daily, Disp: 90 tablet, Rfl: 3    clonazePAM (KlonoPIN) 1 mg tablet, Take 1 tablet (1 mg total) by mouth 2 (two) times a day, Disp: 60 tablet, Rfl: 0    Eliquis 2 5 MG, take 1 tablet by mouth twice a day, Disp: 180 tablet, Rfl: 0    Empagliflozin (Jardiance) 10 MG TABS, Take 1 tablet (10 mg total) by mouth every morning, Disp: 90 tablet, Rfl: 1    furosemide (LASIX) 80 mg tablet, Take 1 tablet (80 mg total) by mouth 2 (two) times a day, Disp: 180 tablet, Rfl: 3    gabapentin (NEURONTIN) 300 mg capsule, Take 1 capsule (300 mg total) by mouth 2 (two) times a day, Disp: 180 capsule, Rfl: 1    lamoTRIgine (LaMICtal) 100 mg tablet, Take 1 tablet (100 mg total) by mouth 2 (two) times a day, Disp: 60 tablet, Rfl: 2    levothyroxine 50 mcg tablet, take 1 tablet by mouth once daily, Disp: 90 tablet, Rfl: 0    metoprolol succinate (TOPROL-XL) 100 mg 24 hr tablet, Take 1 tablet (100 mg total) by mouth 2 (two) times a day, Disp: 180 tablet, Rfl: 0    nitroglycerin (NITROSTAT) 0 4 mg SL tablet, Place 1 tablet (0 4 mg total) under the tongue every 5 (five) minutes as needed for chest pain, Disp: 10 tablet, Rfl: 0    potassium chloride (Klor-Con M20) 20 mEq tablet, Take 1 tablet (20 mEq total) by mouth daily, Disp: 90 tablet, Rfl: 3    QUEtiapine (SEROquel) 25 mg tablet, Take 1 tablet (25 mg total) by mouth daily at bedtime, Disp: 30 tablet, Rfl: 0    sacubitril-valsartan (Entresto) 49-51 MG TABS, Take 1 tablet by mouth 2 (two) times a day, Disp: 180 tablet, Rfl: 3    tiotropium (Spiriva Respimat) 2 5 MCG/ACT AERS inhaler, Inhale 2 puffs daily, Disp: 4 g, Rfl: 6    Review of Systems:  Aside from what is mentioned in the HPI, the review of systems is otherwise negative    Past medical history, surgical history, and family history were reviewed and updated as appropriate    Social history updates:  Social History     Tobacco Use   Smoking Status Current Every Day Smoker    Packs/day: 1 00    Years: 45 00    Pack years: 45 00    Types: Cigarettes    Start date: 1976   Smokeless Tobacco Former User    Types: Chew    Quit date: 1984   Tobacco Comment    20 cigs daily as of  07/21/2022       PhysicalExamination:  Vitals:   /80 (BP Location: Left arm, Patient Position: Sitting, Cuff Size: Large)   Pulse (!) 112   Temp 99 2 °F (37 3 °C)   Resp 22   Ht 6' 1" (1 854 m)   Wt 123 kg (272 lb)   SpO2 98%   BMI 35 89 kg/m²   Gen:  Comfortable on room air  No conversational dyspnea  HEENT:  Conjugate gaze  sclerae anicteric  Oropharynx moist  Neck: Trachea is midline  No JVD  No adenopathy  Chest:  Distant but symmetric breath sounds  No wheeze or crackles  Cardiac:  Regular  no murmur  Abdomen:  benign  Extremities:  Mild leg edema with some stasis changes  Neuro:  Normal speech and mentation    Diagnostic Data:  Labs:   I personally reviewed the most recent laboratory data pertinent to today's visit    Lab Results   Component Value Date    WBC 9 16 12/03/2021    HGB 18 1 (H) 12/03/2021    HCT 54 4 (H) 12/03/2021    MCV 97 12/03/2021     12/03/2021     Lab Results   Component Value Date    SODIUM 139 03/18/2022    K 3 9 03/18/2022    CO2 28 03/18/2022     03/18/2022    BUN 14 03/18/2022    CREATININE 1 34 (H) 03/18/2022    CALCIUM 8 8 03/18/2022       Imaging:  I personally reviewed the images on the [facility] system pertinent to today's visit  CT scan of the chest from July 11th does show stable pulmonary nodules and a more confluent scar-like opacity in the right base    Soumya Diaz MD

## 2022-07-21 NOTE — ASSESSMENT & PLAN NOTE
Wt Readings from Last 3 Encounters:   07/21/22 123 kg (272 lb)   07/14/22 126 kg (277 lb)   07/12/22 123 kg (272 lb)     · Recent echocardiogram did demonstrate improvement in ejection fraction to 45% from 35%  · Continue cardiovascular follow-up

## 2022-07-21 NOTE — ASSESSMENT & PLAN NOTE
· Has been cutting down on smoking but is at 1 pack daily  At peak was 3 packs per day   · He is not ready to quit entirely   · Does continue to meet lung cancer screening criteria and has pulmonary nodules      · Findings on recent CT scan are amenable to continued lung cancer screening surveillance in 1 year, July 2023

## 2022-07-27 ENCOUNTER — REMOTE DEVICE CLINIC VISIT (OUTPATIENT)
Dept: CARDIOLOGY CLINIC | Facility: CLINIC | Age: 52
End: 2022-07-27

## 2022-07-27 DIAGNOSIS — Z95.810 AICD (AUTOMATIC CARDIOVERTER/DEFIBRILLATOR) PRESENT: Primary | ICD-10-CM

## 2022-07-27 PROCEDURE — RECHECK

## 2022-07-27 NOTE — PROGRESS NOTES
Results for orders placed or performed in visit on 07/27/22   Cardiac EP device report    Narrative    [employer] BIV ICD  N/B; CARELINK TRANSMISSION - BATTERY STATUS: BATTERY VOLTAGE NEARING MADHURI (2 MOS/2 74V-RRT 2 73V)  WILL CONTINUE MONTHLY BATTERY CHECKS  PLEASE NOTE PRESENTING EGM - AR/VS @  BPM  AP 1 3% BVP 98 1% ( 96 4% + VSR PACE 1 7%  CHRONICALLY ELEVATED LV THRESHOLD  ALL OTHER AVAILABLE LEAD PARAMETERS WITHIN NORMAL LIMITS  1 AT/AF EPISODE WITH AFL ON EGM, DURATION 54 SECS  AF BURDEN <0 1%  467  V-SENSE EPISODES W/ AVAIL MARKERS SHOWING PAT, ST W/ -143 BPM, EPISODE IN PROGRESS  NIGHT HEART RATES >85 BPM X7 DAYS  EF 45% (7/14/22 ECHO)  PT TAKES ASA 81, ELIQUIS, METOPROLOL SUCC  TASK TO DR DUNHAM FOR PRESENTING HR  OPTI-VOL WITHIN NORMAL LIMITS  NORMAL DEVICE FUNCTION    ES

## 2022-08-04 ENCOUNTER — TELEPHONE (OUTPATIENT)
Dept: INTERNAL MEDICINE CLINIC | Facility: CLINIC | Age: 52
End: 2022-08-04

## 2022-08-16 ENCOUNTER — APPOINTMENT (OUTPATIENT)
Dept: LAB | Facility: CLINIC | Age: 52
End: 2022-08-16
Payer: MEDICARE

## 2022-08-16 DIAGNOSIS — R73.03 PRE-DIABETES: ICD-10-CM

## 2022-08-16 DIAGNOSIS — I10 PRIMARY HYPERTENSION: ICD-10-CM

## 2022-08-16 DIAGNOSIS — E78.5 HYPERLIPIDEMIA, UNSPECIFIED HYPERLIPIDEMIA TYPE: ICD-10-CM

## 2022-08-16 LAB
ALBUMIN SERPL BCP-MCNC: 3.6 G/DL (ref 3.5–5)
ALP SERPL-CCNC: 58 U/L (ref 34–104)
ALT SERPL W P-5'-P-CCNC: 11 U/L (ref 7–52)
ANION GAP SERPL CALCULATED.3IONS-SCNC: 7 MMOL/L (ref 4–13)
AST SERPL W P-5'-P-CCNC: 13 U/L (ref 13–39)
BILIRUB SERPL-MCNC: 0.72 MG/DL (ref 0.2–1)
BUN SERPL-MCNC: 12 MG/DL (ref 5–25)
CALCIUM SERPL-MCNC: 8.9 MG/DL (ref 8.4–10.2)
CHLORIDE SERPL-SCNC: 98 MMOL/L (ref 96–108)
CHOLEST SERPL-MCNC: 165 MG/DL
CO2 SERPL-SCNC: 30 MMOL/L (ref 21–32)
CREAT SERPL-MCNC: 1.06 MG/DL (ref 0.6–1.3)
EST. AVERAGE GLUCOSE BLD GHB EST-MCNC: 120 MG/DL
GFR SERPL CREATININE-BSD FRML MDRD: 79 ML/MIN/1.73SQ M
GLUCOSE P FAST SERPL-MCNC: 111 MG/DL (ref 65–99)
HBA1C MFR BLD: 5.8 %
HDLC SERPL-MCNC: 39 MG/DL
LDLC SERPL CALC-MCNC: 104 MG/DL (ref 0–100)
POTASSIUM SERPL-SCNC: 4 MMOL/L (ref 3.5–5.3)
PROT SERPL-MCNC: 6.5 G/DL (ref 6.4–8.4)
SODIUM SERPL-SCNC: 135 MMOL/L (ref 135–147)
TRIGL SERPL-MCNC: 112 MG/DL

## 2022-08-16 PROCEDURE — 83036 HEMOGLOBIN GLYCOSYLATED A1C: CPT

## 2022-08-16 PROCEDURE — 36415 COLL VENOUS BLD VENIPUNCTURE: CPT

## 2022-08-16 PROCEDURE — 80061 LIPID PANEL: CPT

## 2022-08-16 PROCEDURE — 80053 COMPREHEN METABOLIC PANEL: CPT

## 2022-08-18 ENCOUNTER — IN-CLINIC DEVICE VISIT (OUTPATIENT)
Dept: CARDIOLOGY CLINIC | Facility: CLINIC | Age: 52
End: 2022-08-18
Payer: MEDICARE

## 2022-08-18 ENCOUNTER — OFFICE VISIT (OUTPATIENT)
Dept: CARDIOLOGY CLINIC | Facility: CLINIC | Age: 52
End: 2022-08-18
Payer: MEDICARE

## 2022-08-18 VITALS
BODY MASS INDEX: 35.92 KG/M2 | HEIGHT: 73 IN | HEART RATE: 86 BPM | DIASTOLIC BLOOD PRESSURE: 54 MMHG | OXYGEN SATURATION: 96 % | WEIGHT: 271 LBS | SYSTOLIC BLOOD PRESSURE: 96 MMHG

## 2022-08-18 DIAGNOSIS — Z95.810 AICD (AUTOMATIC CARDIOVERTER/DEFIBRILLATOR) PRESENT: Primary | ICD-10-CM

## 2022-08-18 DIAGNOSIS — I47.29 NSVT (NONSUSTAINED VENTRICULAR TACHYCARDIA): ICD-10-CM

## 2022-08-18 DIAGNOSIS — I44.7 LBBB (LEFT BUNDLE BRANCH BLOCK): ICD-10-CM

## 2022-08-18 DIAGNOSIS — Z86.711 HISTORY OF PULMONARY EMBOLISM: ICD-10-CM

## 2022-08-18 DIAGNOSIS — I50.22 CHRONIC SYSTOLIC CHF (CONGESTIVE HEART FAILURE), NYHA CLASS 3 (HCC): Primary | ICD-10-CM

## 2022-08-18 DIAGNOSIS — E78.5 HYPERLIPIDEMIA: ICD-10-CM

## 2022-08-18 DIAGNOSIS — G47.33 OSA (OBSTRUCTIVE SLEEP APNEA): ICD-10-CM

## 2022-08-18 PROCEDURE — 99214 OFFICE O/P EST MOD 30 MIN: CPT | Performed by: INTERNAL MEDICINE

## 2022-08-18 PROCEDURE — 93284 PRGRMG EVAL IMPLANTABLE DFB: CPT | Performed by: INTERNAL MEDICINE

## 2022-08-18 RX ORDER — TORSEMIDE 20 MG/1
40 TABLET ORAL 2 TIMES DAILY
Qty: 60 TABLET | Refills: 3 | Status: SHIPPED | OUTPATIENT
Start: 2022-08-18 | End: 2022-08-23 | Stop reason: SDUPTHER

## 2022-08-18 NOTE — PROGRESS NOTES
Results for orders placed or performed in visit on 08/18/22   Cardiac EP device report    Narrative    [employer] BIV ICD  DEVICE INTERROGATED IN THE [place] OFFICE  BATTERY REACHED RRT ON 8/15/22  BVP-99% (TOTAL -94%+VSR PACE-4 7%)  CHRONICALLY HIGH LV THRESHOLD  ALL OTHER LEAD PARAMETERS WITHIN NORMAL LIMITS  NO NEW SIGNIFICANT HIGH RATE EPISODES  7 NEW VENT SENSING EPISODES ON SAME DAY  OPTI-VOL FLUID THRESHOLD CROSSED ON 8/14/22 & ONGOING  PT ON FUROSEMIDE  PT C/0 INTERMITTENT PEDAL EDEMA  LOW BATTERY VOLTAGE ALERTS PROGRAMMED OFF  PT TO SEE DR DUNHAM TODAY  NORMAL DEVICE FUNCTION W/ BATTERY RRT   GV

## 2022-08-22 DIAGNOSIS — Z95.9 CARDIAC DEVICE IN SITU: ICD-10-CM

## 2022-08-22 DIAGNOSIS — I44.7 LBBB (LEFT BUNDLE BRANCH BLOCK): Primary | ICD-10-CM

## 2022-08-23 DIAGNOSIS — I50.22 CHRONIC SYSTOLIC CHF (CONGESTIVE HEART FAILURE), NYHA CLASS 3 (HCC): ICD-10-CM

## 2022-08-29 DIAGNOSIS — F41.8 DEPRESSION WITH ANXIETY: ICD-10-CM

## 2022-08-30 RX ORDER — CLONAZEPAM 1 MG/1
1 TABLET ORAL 2 TIMES DAILY
Qty: 60 TABLET | Refills: 0 | Status: SHIPPED | OUTPATIENT
Start: 2022-08-30

## 2022-09-02 DIAGNOSIS — I50.22 CHRONIC SYSTOLIC CHF (CONGESTIVE HEART FAILURE), NYHA CLASS 3 (HCC): ICD-10-CM

## 2022-09-03 DIAGNOSIS — I26.92 ACUTE SADDLE PULMONARY EMBOLISM WITHOUT ACUTE COR PULMONALE (HCC): ICD-10-CM

## 2022-09-03 DIAGNOSIS — I50.22 CHRONIC SYSTOLIC CHF (CONGESTIVE HEART FAILURE), NYHA CLASS 3 (HCC): ICD-10-CM

## 2022-09-07 ENCOUNTER — HOSPITAL ENCOUNTER (OUTPATIENT)
Dept: RADIOLOGY | Facility: HOSPITAL | Age: 52
Discharge: HOME | End: 2022-09-07
Attending: INTERNAL MEDICINE | Admitting: INTERNAL MEDICINE
Payer: MEDICARE

## 2022-09-07 DIAGNOSIS — I44.7 LBBB (LEFT BUNDLE BRANCH BLOCK): ICD-10-CM

## 2022-09-07 DIAGNOSIS — Z95.9 CARDIAC DEVICE IN SITU: ICD-10-CM

## 2022-09-07 PROCEDURE — 75820 VEIN X-RAY ARM/LEG: CPT

## 2022-09-07 PROCEDURE — NC001 PR NO CHARGE: Performed by: INTERNAL MEDICINE

## 2022-09-07 PROCEDURE — 36005 INJECTION EXT VENOGRAPHY: CPT

## 2022-09-07 RX ADMIN — IOHEXOL 30 ML: 300 INJECTION, SOLUTION INTRAVENOUS at 14:52

## 2022-09-08 DIAGNOSIS — F41.9 ANXIETY AND DEPRESSION: ICD-10-CM

## 2022-09-08 DIAGNOSIS — F32.A ANXIETY AND DEPRESSION: ICD-10-CM

## 2022-09-08 RX ORDER — LAMOTRIGINE 100 MG/1
TABLET ORAL
Qty: 60 TABLET | Refills: 2 | Status: SHIPPED | OUTPATIENT
Start: 2022-09-08

## 2022-09-14 ENCOUNTER — OFFICE VISIT (OUTPATIENT)
Dept: PODIATRY | Facility: CLINIC | Age: 52
End: 2022-09-14
Payer: MEDICARE

## 2022-09-14 VITALS
BODY MASS INDEX: 35.92 KG/M2 | DIASTOLIC BLOOD PRESSURE: 54 MMHG | HEIGHT: 73 IN | WEIGHT: 271 LBS | HEART RATE: 84 BPM | SYSTOLIC BLOOD PRESSURE: 96 MMHG

## 2022-09-14 DIAGNOSIS — B35.1 ONYCHOMYCOSIS: ICD-10-CM

## 2022-09-14 DIAGNOSIS — G62.9 NEUROPATHY: ICD-10-CM

## 2022-09-14 DIAGNOSIS — I73.9 PERIPHERAL ARTERIAL DISEASE (HCC): Primary | ICD-10-CM

## 2022-09-14 PROCEDURE — 11721 DEBRIDE NAIL 6 OR MORE: CPT | Performed by: PODIATRIST

## 2022-09-14 NOTE — PROGRESS NOTES
Ena Domínguez  1970  AT RISK FOOT CARE    1  Peripheral arterial disease ([facility] [address] )     2  Onychomycosis     3  Neuropathy         Patient presents for at-risk foot care  Patient has no acute concerns today  Patient has significant lower extremity risk due to diminished pulses in the feet and trophic skin changes to the lower extremity including thick toenail, atrophic skin, and decreased hair growth  On exam patient has thickened, hypertrophic, discolored, brittle toenails with subungual debris and tenderness x10   Callus: none today  Patient has diminished pedal pulses and decreased perfusion to the lower extremities  Patient has significant trophic changes to the skin including thick toenails, decreased pedal hair and atrophic skin  Today's treatment includes:  Debridement of toenails  Using nail nipper, wisam, and curette, nails were sharply debrided, reduced in thickness and length  Devitalized nail tissue and fungal debris excised and removed  Patient tolerated well  Discussed proper shoe gear, daily inspections of feet, and general foot health with patient  Patient has Q8  findings and is recommended for at risk foot care every 9-10 weeks      Patients most recent complete clinical foot exam was on: 2/9/2022

## 2022-09-26 ENCOUNTER — OFFICE VISIT (OUTPATIENT)
Dept: CARDIOLOGY CLINIC | Facility: CLINIC | Age: 52
End: 2022-09-26
Payer: MEDICARE

## 2022-09-26 VITALS
BODY MASS INDEX: 35.7 KG/M2 | HEIGHT: 73 IN | SYSTOLIC BLOOD PRESSURE: 116 MMHG | HEART RATE: 94 BPM | WEIGHT: 269.4 LBS | DIASTOLIC BLOOD PRESSURE: 80 MMHG

## 2022-09-26 DIAGNOSIS — E03.9 HYPOTHYROIDISM, UNSPECIFIED TYPE: ICD-10-CM

## 2022-09-26 DIAGNOSIS — I47.29 NSVT (NONSUSTAINED VENTRICULAR TACHYCARDIA): Primary | ICD-10-CM

## 2022-09-26 PROCEDURE — 99215 OFFICE O/P EST HI 40 MIN: CPT | Performed by: INTERNAL MEDICINE

## 2022-09-26 PROCEDURE — 93000 ELECTROCARDIOGRAM COMPLETE: CPT | Performed by: INTERNAL MEDICINE

## 2022-09-26 RX ORDER — LEVOTHYROXINE SODIUM 0.05 MG/1
TABLET ORAL
Qty: 90 TABLET | Refills: 0 | Status: SHIPPED | OUTPATIENT
Start: 2022-09-26

## 2022-10-20 ENCOUNTER — OFFICE VISIT (OUTPATIENT)
Dept: CARDIAC SURGERY | Facility: CLINIC | Age: 52
End: 2022-10-20
Payer: MEDICARE

## 2022-10-20 VITALS
TEMPERATURE: 97 F | HEIGHT: 73 IN | WEIGHT: 236 LBS | DIASTOLIC BLOOD PRESSURE: 85 MMHG | HEART RATE: 103 BPM | OXYGEN SATURATION: 100 % | SYSTOLIC BLOOD PRESSURE: 139 MMHG | BODY MASS INDEX: 31.28 KG/M2

## 2022-10-20 DIAGNOSIS — I42.0 CARDIOMYOPATHY, DILATED (HCC): Chronic | ICD-10-CM

## 2022-10-20 DIAGNOSIS — T82.110A FAILURE OF PACEMAKER LEAD, INITIAL ENCOUNTER: Primary | ICD-10-CM

## 2022-10-20 DIAGNOSIS — Z45.010 PACEMAKER BATTERY DEPLETION: ICD-10-CM

## 2022-10-20 DIAGNOSIS — I42.0 DILATED CARDIOMYOPATHY (HCC): ICD-10-CM

## 2022-10-20 PROCEDURE — 99204 OFFICE O/P NEW MOD 45 MIN: CPT | Performed by: NURSE PRACTITIONER

## 2022-10-20 RX ORDER — CEFAZOLIN SODIUM 2 G/50ML
2000 SOLUTION INTRAVENOUS ONCE
OUTPATIENT
Start: 2022-10-20 | End: 2022-10-20

## 2022-10-20 NOTE — PROGRESS NOTES
Consultation - Cardiothoracic Surgery   Vanessa Patel 45 y o  male MRN: [id number]    Physician Requesting Consult: Dr Torsten Padilla    Reason for Consult / Principal Problem: Pacemaker battery depletion; LV lead high threshold    History of Present Illness: Vanessa Patel is a 45y o  year old male referred for surgical consultation for device and lead extraction to facilitate device and lead upgrade  Patient's PMHx is notable for NICMP s/p BiV ICD (7/6261), chronic systolic CHF,  NSVT,  COPD, (+) active tobacco abuse, MARRY (no CPAP), HTN, HLD, CKD2, h/o saddle PE/ LE DVT 7/2021 (Eliquis), PAD (50-75% R CFA stenosis), venous insufficiency w/ chronic LE edema, pre-Diabetes (A1c 5 8%), hypothyroidism, obesity (BMI 31 14) and h/o alcohol abuse  Patient first diagnosed with cardiomyopathy when he presented to [facility] in 2017 with progressive SOB and cough  Echo demonstrated dilated cardiomyopathy with an EF 10-15%  Ischemic w/u (-), BiV ICD implanted 3/2017  Patient has longstanding h/o tobacco abuse (started smoking at age 4, and at one point 3 ppd) currently 1 ppd and daily/heavy ETOH abuse, cutting down and currently admits to a 6-pack of beer weekly  Most recent echo demonstrates an EF 45%  He is on Eliquis for saddle PE July 2021  He has chronic LE edema  He has chronic SOB  He does not sleep well  He Blayne chest pain, ICD shocks, palpitations or syncope       Past Medical History:  Past Medical History:   Diagnosis Date   • Anxiety and depression    • CHF (congestive heart failure) ([address] )    • COPD (chronic obstructive pulmonary disease) ([facility])    • Dilated cardiomyopathy (HCC)    • Hypertension    • MARRY (obstructive sleep apnea)          Past Surgical History:   Past Surgical History:   Procedure Laterality Date   • BRONCHOSCOPY  03/10/2017   • CARDIAC PACEMAKER PLACEMENT  10/04/2017   • IR UPPER EXTREMITY VENOGRAM- DIAGNOSTIC  9/7/2022         Family History:  Family History   Problem Relation Age of Onset • Diabetes Mother    • Emphysema Mother    • No Known Problems Father    • Emphysema Maternal Aunt    • Stroke Neg Hx          Social History:    Social History     Substance and Sexual Activity   Alcohol Use Yes    Comment: Socially, several times per week     Social History     Substance and Sexual Activity   Drug Use No     Social History     Tobacco Use   Smoking Status Current Every Day Smoker   • Packs/day: 1 00   • Years: 45 00   • Pack years: 45 00   • Types: Cigarettes   • Start date: 1976   Smokeless Tobacco Former User   • Types: Chew   • Quit date: 1984   Tobacco Comment    20 cigs daily as of  07/21/2022       Home Medications:   Prior to Admission medications    Medication Sig Start Date End Date Taking?  Authorizing Provider   albuterol (PROVENTIL HFA,VENTOLIN HFA) 90 mcg/act inhaler Inhale 2 puffs every 6 (six) hours as needed for wheezing or shortness of breath 9/30/20  Yes Félix Oakley DO   apixaban (Eliquis) 2 5 mg Take 1 tablet (2 5 mg total) by mouth 2 (two) times a day 9/6/22  Yes SHERRY Hewitt   aspirin 81 mg chewable tablet Chew 1 tablet (81 mg total) daily 3/19/18  Yes Oj Almazan DO   clonazePAM (KlonoPIN) 1 mg tablet Take 1 tablet (1 mg total) by mouth 2 (two) times a day 8/30/22  Yes SHERRY Hewitt   Empagliflozin (Jardiance) 10 MG TABS Take 1 tablet (10 mg total) by mouth every morning 9/6/22  Yes SHERRY Hewitt   gabapentin (NEURONTIN) 300 mg capsule Take 1 capsule (300 mg total) by mouth 2 (two) times a day 5/2/22  Yes SHERRY Hewitt   lamoTRIgine (LaMICtal) 100 mg tablet take 1 tablet by mouth twice a day 9/8/22  Yes SHERRY Hewitt   levothyroxine 50 mcg tablet take 1 tablet by mouth once daily 9/26/22  Yes SHERRY Hewitt   metoprolol succinate (TOPROL-XL) 100 mg 24 hr tablet Take 1 tablet (100 mg total) by mouth 2 (two) times a day 6/8/22  Yes SHERRY Hewitt   nitroglycerin (NITROSTAT) 0 4 mg SL tablet Place 1 tablet (0 4 mg total) under the tongue every 5 (five) minutes as needed for chest pain 9/30/20  Yes Lewis Coleman DO   potassium chloride (Klor-Con M20) 20 mEq tablet Take 1 tablet (20 mEq total) by mouth daily 9/23/21  Yes Lewis Coleman DO   sacubitril-valsartan Armaanjacqueline Diego) 49-51 MG TABS Take 1 tablet by mouth 2 (two) times a day 6/29/22  Yes Cecilia Delatorre DO   tiotropium (Spiriva Respimat) 2 5 MCG/ACT AERS inhaler Inhale 2 puffs daily 7/21/22  Yes Santo Cutler MD   Torsemide 40 MG TABS Take 40 mg by mouth 2 (two) times a day 9/2/22  Yes Cecilia Delatorre DO   QUEtiapine (SEROquel) 25 mg tablet Take 1 tablet (25 mg total) by mouth daily at bedtime  Patient not taking: No sig reported 6/1/22   SHERRY Hong       Allergies:   Allergies   Allergen Reactions   • Chantix [Varenicline] Other (See Comments)     Depression       Review of Systems:  Review of Systems - History obtained from chart review and the patient  General ROS: positive for  - fatigue and sleep distrubances  negative for - chills or fever  Psychological ROS: negative  Ophthalmic ROS: positive for - uses glasses  ENT ROS: positive for - poor dentition- lower teeth, upper dentures  Allergy and Immunology ROS: negative  Hematological and Lymphatic ROS: negative  Endocrine ROS: negative  Breast ROS: negative  Respiratory ROS: positive for - shortness of breath  negative for - cough, hemoptysis, orthopnea or pleuritic pain  Cardiovascular ROS: positive for - dyspnea on exertion, edema and shortness of breath  negative for - chest pain, irregular heartbeat, loss of consciousness, orthopnea, palpitations or paroxysmal nocturnal dyspnea  Gastrointestinal ROS: no abdominal pain, change in bowel habits, or black or bloody stools  Genito-Urinary ROS: no dysuria, trouble voiding, or hematuria  Musculoskeletal ROS: negative  Neurological ROS: no TIA or stroke symptoms  Dermatological ROS: negative    Vital Signs:     Vitals:    10/20/22 1347 10/20/22 1355   BP: 135/80 139/85   BP Location: Left arm Right arm   Patient Position: Sitting Sitting   Cuff Size: Large Large   Pulse: 103    Temp: (!) 97 °F (36 1 °C)    TempSrc: Tympanic    SpO2: 100%    Weight: 107 kg (236 lb)    Height: 6' 1" (1 854 m)        Physical Exam:    General: Alert, oriented, obese, no acute distress  HEENT/NECK:  PERRLA  No jugular venous distention  Cardiac:Regular rate and rhythm, no murmurs rubs or gallops  PPM site left upper anterior chest   Carotid arteries: 2+ pulses, no bruits  Pulmonary:  Breath sounds clear bilaterally  Abdomen:  Non-tender, Non-distended  Positive bowel sounds  Upper extremities: 2+ radial pulses; brisk capillary refill  Lower extremities: Extremities warm/dry  PT/DP pulses unable to palpate pulses due to significant edema bilaterally  3-4+ edema B/L, L> R  Neuro: Alert and oriented X 3  Sensation is grossly intact  No focal deficits  Musculoskeletal: MAEE, stable gait  Skin: Warm/Dry, without rashes or lesions  Lab Results:   Lab Results   Component Value Date    SODIUM 135 08/16/2022    K 4 0 08/16/2022    CL 98 08/16/2022    CO2 30 08/16/2022    AGAP 7 08/16/2022    BUN 12 08/16/2022    CREATININE 1 06 08/16/2022    GLUC 123 03/18/2022    GLUF 111 (H) 08/16/2022    CALCIUM 8 9 08/16/2022    AST 13 08/16/2022    ALT 11 08/16/2022    ALKPHOS 58 08/16/2022    TP 6 5 08/16/2022    TBILI 0 72 08/16/2022    EGFR 79 08/16/2022     Lab Results   Component Value Date    HGBA1C 5 8 (H) 08/16/2022     Lab Results   Component Value Date    TROPONINI <0 03 07/15/2021       Imaging Studies:     Cardiac Catheterization: 2017    No CAD    Echocardiogram: 7/14/22    •  Left Ventricle: Left ventricular cavity size is normal  Wall thickness is normal  The left ventricular ejection fraction is 45%  Systolic function is mildly reduced  There is mild global hypokinesis  •  IVS: There is abnormal septal motion consistent with left bundle branch block    • Mitral Valve: There is mild regurgitation      Findings    Left Ventricle Left ventricular cavity size is normal  Wall thickness is normal  The left ventricular ejection fraction is 45%  Systolic function is mildly reduced  There is mild global hypokinesis  Unable to assess diastolic function due to E/A fusion  Interventricular Septum There is abnormal septal motion consistent with left bundle branch block  Right Ventricle Right ventricular cavity size is normal  Systolic function is normal  Wall thickness is normal  A pacer wire is present  Left Atrium The atrium is normal in size  Right Atrium The atrium is normal in size  Aortic Valve The aortic valve is trileaflet  The leaflets are not thickened  The leaflets are not calcified  The leaflets exhibit normal mobility  There is no evidence of regurgitation  The aortic valve has no significant stenosis  Mitral Valve The mitral valve has normal structure and function  There is mild regurgitation  There is no evidence of stenosis  Tricuspid Valve Tricuspid valve structure is normal  There is no evidence of regurgitation  There is no evidence of stenosis  Pulmonic Valve Pulmonic valve structure is normal  There is no evidence of regurgitation  There is no evidence of stenosis  Ascending Aorta The aortic root is normal in size  IVC/SVC The inferior vena cava is normal in size  Pericardium There is no pericardial effusion  The pericardium is normal in appearance         Left Ventricle Measurements    Function/Volumes   A4C EF 46 %         LV Diastolic Volume (BP) 947 mL         LV Systolic Volume (BP) 92 mL         EF 48 %         Dimensions   LVIDd 5 1 cm         LVIDS 4 1 cm         IVSd 1 1 cm         LVPWd 1 1 cm         FS 20 %               Right Ventricle Measurements    Dimensions   RVID d 3 4 cm               Left Atrium Measurements    Dimensions   LA size 3 9 cm         LA length (A2C) 4 6 cm         URIEL A4C 18 1 cm2               Right Atrium Measurements    Dimensions   RAA A4C 14 1 cm2               Aorta Measurements    Aortic Dimensions   Ao root 4 2 cm         Asc Ao 3 4 cm               CT Scan: 7/11/22     Some of the previously described pulmonary nodules now appear to be associated with areas of linear scarring or subsegmental atelectasis  A component of this may be related to patient's prior pulmonary emboli      Additional visualized pulmonary nodules appear stable from prior  Patient should continue with low-dose screening CT      Emphysema and mild diffuse bronchial wall thickening  I have personally reviewed pertinent films in PACS     PCP and Cardiology notes reviewed      Assessment:  Patient Active Problem List    Diagnosis Date Noted   • Dilated cardiomyopathy ([facility] )    • Pacemaker battery depletion    • Pacemaker lead failure    • NSVT (nonsustained ventricular tachycardia) 03/08/2022   • Stage 2 chronic kidney disease 11/20/2021   • Hyperlipidemia 11/20/2021   • Elevated hemoglobin ([place] [address] ) 11/20/2021   • Personal history of DVT (deep vein thrombosis) 11/20/2021   • Pulmonary Embolism July 2021 11/20/2021   • Neuropathy 08/15/2021   • Severe obesity (BMI 35 0-39  9) with comorbidity ([place] [address] ) 08/02/2021   • Pre-diabetes 08/02/2021   • Hypothyroidism 07/26/2021   • Moderate COPD (chronic obstructive pulmonary disease) (HCC)    • MARRY (obstructive sleep apnea)    • Atherosclerosis of native arteries of right leg with ulceration of other part of foot ([place] [address] ) 03/22/2018   • Peripheral arterial disease ([place] [address] ) 02/20/2018   • Cardiomyopathy, dilated ([place] [address] ) 08/10/2017   • LBBB (left bundle branch block) 08/09/2017   • Chronic systolic CHF (congestive heart failure), NYHA class 3 ([place] [address] ) 06/30/2017   • HTN (hypertension) 06/28/2017   • Alcohol abuse 06/28/2017   • Tobacco use 06/28/2017         Impression/Plan:    NICMP s/p BiV ICD,  battery depletion  LV lead high threshold        Risks and benefits of lead extraction were discussed in detail today with the patient  He understands and wishes to proceed with further workup and ultimately surgical intervention  We have ordered routine preoperative laboratory studies and ECG  Patient is scheduled for surgery 11/4/22 with VALENTINA Ta  Pre op instruction provided to patient  He will hold Eliquis 2 days before surgery  Sophiadaquan Ami was comfortable with our recommendations, and their questions were answered to their satisfaction  Thank you for allowing us to participate in the care of this patient       Patient declining GI referral for colonoscopy    SIGNATURE: Junior Patrick  DATE: October 20, 2022  TIME: 2:14 PM

## 2022-10-20 NOTE — LETTER
October 20, 2022     Issa Henderson MD  [facility] [address] [address]    Patient: Ana Gillis   YOB: 1970   Date of Visit: 10/20/2022       Dear Dr Carolyn Babinski: Thank you for referring Gatito Brambila to me for evaluation  Below are my notes for this consultation  If you have questions, please do not hesitate to call me  I look forward to following your patient along with you  Sincerely,        Nargis Phillips MD        CC: SHERRY Paris Pacer, [address]  10/20/2022  2:50 PM  Attested  Consultation - Cardiothoracic Surgery   Ana Gillis 45 y o  male MRN: [id number]    Physician Requesting Consult: Dr Carolyn Babinski    Reason for Consult / Principal Problem: Pacemaker battery depletion; LV lead high threshold    History of Present Illness: Ana Gillis is a 45y o  year old male referred for surgical consultation for device and lead extraction to facilitate device and lead upgrade  Patient's PMHx is notable for NICMP s/p BiV ICD (3/6382), chronic systolic CHF,  NSVT,  COPD, (+) active tobacco abuse, MARRY (no CPAP), HTN, HLD, CKD2, h/o saddle PE/ LE DVT 7/2021 (Eliquis), PAD (50-75% R CFA stenosis), venous insufficiency w/ chronic LE edema, pre-Diabetes (A1c 5 8%), hypothyroidism, obesity (BMI 31 14) and h/o alcohol abuse  Patient first diagnosed with cardiomyopathy when he presented to [facility] in 2017 with progressive SOB and cough  Echo demonstrated dilated cardiomyopathy with an EF 10-15%  Ischemic w/u (-), BiV ICD implanted 3/2017  Patient has longstanding h/o tobacco abuse (started smoking at age 4, and at one point 3 ppd) currently 1 ppd and daily/heavy ETOH abuse, cutting down and currently admits to a 6-pack of beer weekly  Most recent echo demonstrates an EF 45%  He is on Eliquis for saddle PE July 2021  He has chronic LE edema  He has chronic SOB  He does not sleep well  He Blayne chest pain, ICD shocks, palpitations or syncope       Past Medical History:  Past Medical History:   Diagnosis Date   • Anxiety and depression    • CHF (congestive heart failure) ([facility])    • COPD (chronic obstructive pulmonary disease) ([facility])    • Dilated cardiomyopathy (HCC)    • Hypertension    • MARRY (obstructive sleep apnea)          Past Surgical History:   Past Surgical History:   Procedure Laterality Date   • BRONCHOSCOPY  03/10/2017   • CARDIAC PACEMAKER PLACEMENT  10/04/2017   • IR UPPER EXTREMITY VENOGRAM- DIAGNOSTIC  9/7/2022         Family History:  Family History   Problem Relation Age of Onset   • Diabetes Mother    • Emphysema Mother    • No Known Problems Father    • Emphysema Maternal Aunt    • Stroke Neg Hx          Social History:    Social History     Substance and Sexual Activity   Alcohol Use Yes    Comment: Socially, several times per week     Social History     Substance and Sexual Activity   Drug Use No     Social History     Tobacco Use   Smoking Status Current Every Day Smoker   • Packs/day: 1 00   • Years: 45 00   • Pack years: 45 00   • Types: Cigarettes   • Start date: 1976   Smokeless Tobacco Former User   • Types: Chew   • Quit date: 1984   Tobacco Comment    20 cigs daily as of  07/21/2022       Home Medications:   Prior to Admission medications    Medication Sig Start Date End Date Taking?  Authorizing Provider   albuterol (PROVENTIL HFA,VENTOLIN HFA) 90 mcg/act inhaler Inhale 2 puffs every 6 (six) hours as needed for wheezing or shortness of breath 9/30/20  Yes Rylie West,    apixaban (Eliquis) 2 5 mg Take 1 tablet (2 5 mg total) by mouth 2 (two) times a day 9/6/22  Yes SHERRY Mortensen   aspirin 81 mg chewable tablet Chew 1 tablet (81 mg total) daily 3/19/18  Yes Narendra Walker DO   clonazePAM (KlonoPIN) 1 mg tablet Take 1 tablet (1 mg total) by mouth 2 (two) times a day 8/30/22  Yes SHERRY Mortensen   Empagliflozin (Jardiance) 10 MG TABS Take 1 tablet (10 mg total) by mouth every morning 9/6/22  Yes SHERRY Velasquez gabapentin (NEURONTIN) 300 mg capsule Take 1 capsule (300 mg total) by mouth 2 (two) times a day 5/2/22  Yes SHERRY Raza   lamoTRIgine (LaMICtal) 100 mg tablet take 1 tablet by mouth twice a day 9/8/22  Yes SHERRY Raza   levothyroxine 50 mcg tablet take 1 tablet by mouth once daily 9/26/22  Yes SHERRY Raza   metoprolol succinate (TOPROL-XL) 100 mg 24 hr tablet Take 1 tablet (100 mg total) by mouth 2 (two) times a day 6/8/22  Yes SHERRY Raza   nitroglycerin (NITROSTAT) 0 4 mg SL tablet Place 1 tablet (0 4 mg total) under the tongue every 5 (five) minutes as needed for chest pain 9/30/20  Yes Mary Alice Peres DO   potassium chloride (Klor-Con M20) 20 mEq tablet Take 1 tablet (20 mEq total) by mouth daily 9/23/21  Yes Mary Alice Peres DO   sacubitril-valsartan [facility]) 49-51 MG TABS Take 1 tablet by mouth 2 (two) times a day 6/29/22  Yes Sonia Henson DO   tiotropium (Spiriva Respimat) 2 5 MCG/ACT AERS inhaler Inhale 2 puffs daily 7/21/22  Yes Malina Andrews MD   Torsemide 40 MG TABS Take 40 mg by mouth 2 (two) times a day 9/2/22  Yes Sonia Henson DO   QUEtiapine (SEROquel) 25 mg tablet Take 1 tablet (25 mg total) by mouth daily at bedtime  Patient not taking: No sig reported 6/1/22   SHERRY Felder       Allergies:   Allergies   Allergen Reactions   • Chantix [Varenicline] Other (See Comments)     Depression       Review of Systems:  Review of Systems - History obtained from chart review and the patient  General ROS: positive for  - fatigue and sleep distrubances  negative for - chills or fever  Psychological ROS: negative  Ophthalmic ROS: positive for - uses glasses  ENT ROS: positive for - poor dentition- lower teeth, upper dentures  Allergy and Immunology ROS: negative  Hematological and Lymphatic ROS: negative  Endocrine ROS: negative  Breast ROS: negative  Respiratory ROS: positive for - shortness of breath  negative for - cough, hemoptysis, orthopnea or pleuritic pain  Cardiovascular ROS: positive for - dyspnea on exertion, edema and shortness of breath  negative for - chest pain, irregular heartbeat, loss of consciousness, orthopnea, palpitations or paroxysmal nocturnal dyspnea  Gastrointestinal ROS: no abdominal pain, change in bowel habits, or black or bloody stools  Genito-Urinary ROS: no dysuria, trouble voiding, or hematuria  Musculoskeletal ROS: negative  Neurological ROS: no TIA or stroke symptoms  Dermatological ROS: negative    Vital Signs:     Vitals:    10/20/22 1347 10/20/22 1355   BP: 135/80 139/85   BP Location: Left arm Right arm   Patient Position: Sitting Sitting   Cuff Size: Large Large   Pulse: 103    Temp: (!) 97 °F (36 1 °C)    TempSrc: Tympanic    SpO2: 100%    Weight: 107 kg (236 lb)    Height: 6' 1" (1 854 m)        Physical Exam:    General: Alert, oriented, obese, no acute distress  HEENT/NECK:  PERRLA  No jugular venous distention  Cardiac:Regular rate and rhythm, no murmurs rubs or gallops  PPM site left upper anterior chest   Carotid arteries: 2+ pulses, no bruits  Pulmonary:  Breath sounds clear bilaterally  Abdomen:  Non-tender, Non-distended  Positive bowel sounds  Upper extremities: 2+ radial pulses; brisk capillary refill  Lower extremities: Extremities warm/dry  PT/DP pulses unable to palpate pulses due to significant edema bilaterally  3-4+ edema B/L, L> R  Neuro: Alert and oriented X 3  Sensation is grossly intact  No focal deficits  Musculoskeletal: MAEE, stable gait  Skin: Warm/Dry, without rashes or lesions      Lab Results:   Lab Results   Component Value Date    SODIUM 135 08/16/2022    K 4 0 08/16/2022    CL 98 08/16/2022    CO2 30 08/16/2022    AGAP 7 08/16/2022    BUN 12 08/16/2022    CREATININE 1 06 08/16/2022    GLUC 123 03/18/2022    GLUF 111 (H) 08/16/2022    CALCIUM 8 9 08/16/2022    AST 13 08/16/2022    ALT 11 08/16/2022    ALKPHOS 58 08/16/2022    TP 6 5 08/16/2022    TBILI 0 72 08/16/2022 EGFR 79 08/16/2022     Lab Results   Component Value Date    HGBA1C 5 8 (H) 08/16/2022     Lab Results   Component Value Date    TROPONINI <0 03 07/15/2021       Imaging Studies:     Cardiac Catheterization: 2017    No CAD    Echocardiogram: 7/14/22    •  Left Ventricle: Left ventricular cavity size is normal  Wall thickness is normal  The left ventricular ejection fraction is 45%  Systolic function is mildly reduced  There is mild global hypokinesis  •  IVS: There is abnormal septal motion consistent with left bundle branch block  •  Mitral Valve: There is mild regurgitation      Findings    Left Ventricle Left ventricular cavity size is normal  Wall thickness is normal  The left ventricular ejection fraction is 45%  Systolic function is mildly reduced  There is mild global hypokinesis  Unable to assess diastolic function due to E/A fusion  Interventricular Septum There is abnormal septal motion consistent with left bundle branch block  Right Ventricle Right ventricular cavity size is normal  Systolic function is normal  Wall thickness is normal  A pacer wire is present  Left Atrium The atrium is normal in size  Right Atrium The atrium is normal in size  Aortic Valve The aortic valve is trileaflet  The leaflets are not thickened  The leaflets are not calcified  The leaflets exhibit normal mobility  There is no evidence of regurgitation  The aortic valve has no significant stenosis  Mitral Valve The mitral valve has normal structure and function  There is mild regurgitation  There is no evidence of stenosis  Tricuspid Valve Tricuspid valve structure is normal  There is no evidence of regurgitation  There is no evidence of stenosis  Pulmonic Valve Pulmonic valve structure is normal  There is no evidence of regurgitation  There is no evidence of stenosis  Ascending Aorta The aortic root is normal in size  IVC/SVC The inferior vena cava is normal in size     Pericardium There is no pericardial effusion  The pericardium is normal in appearance  Left Ventricle Measurements    Function/Volumes   A4C EF 46 %         LV Diastolic Volume (BP) 289 mL         LV Systolic Volume (BP) 92 mL         EF 48 %         Dimensions   LVIDd 5 1 cm         LVIDS 4 1 cm         IVSd 1 1 cm         LVPWd 1 1 cm         FS 20 %               Right Ventricle Measurements    Dimensions   RVID d 3 4 cm               Left Atrium Measurements    Dimensions   LA size 3 9 cm         LA length (A2C) 4 6 cm         URIEL A4C 18 1 cm2               Right Atrium Measurements    Dimensions   RAA A4C 14 1 cm2               Aorta Measurements    Aortic Dimensions   Ao root 4 2 cm         Asc Ao 3 4 cm               CT Scan: 7/11/22     Some of the previously described pulmonary nodules now appear to be associated with areas of linear scarring or subsegmental atelectasis  A component of this may be related to patient's prior pulmonary emboli      Additional visualized pulmonary nodules appear stable from prior  Patient should continue with low-dose screening CT      Emphysema and mild diffuse bronchial wall thickening  I have personally reviewed pertinent films in PACS     PCP and Cardiology notes reviewed      Assessment:  Patient Active Problem List    Diagnosis Date Noted   • Dilated cardiomyopathy ([facility] )    • Pacemaker battery depletion    • Pacemaker lead failure    • NSVT (nonsustained ventricular tachycardia) 03/08/2022   • Stage 2 chronic kidney disease 11/20/2021   • Hyperlipidemia 11/20/2021   • Elevated hemoglobin ([facility] ) 11/20/2021   • Personal history of DVT (deep vein thrombosis) 11/20/2021   • Pulmonary Embolism July 2021 11/20/2021   • Neuropathy 08/15/2021   • Severe obesity (BMI 35 0-39  9) with comorbidity ([facility] ) 08/02/2021   • Pre-diabetes 08/02/2021   • Hypothyroidism 07/26/2021   • Moderate COPD (chronic obstructive pulmonary disease) (HCC)    • MARRY (obstructive sleep apnea)    • Atherosclerosis of native arteries of right leg with ulceration of other part of foot ([facility] ) 03/22/2018   • Peripheral arterial disease ([facility] ) 02/20/2018   • Cardiomyopathy, dilated ([facility] ) 08/10/2017   • LBBB (left bundle branch block) 08/09/2017   • Chronic systolic CHF (congestive heart failure), NYHA class 3 ([facility] ) 06/30/2017   • HTN (hypertension) 06/28/2017   • Alcohol abuse 06/28/2017   • Tobacco use 06/28/2017         Impression/Plan:    NICMP s/p BiV ICD,  battery depletion  LV lead high threshold        Risks and benefits of lead extraction were discussed in detail today with the patient  He understands and wishes to proceed with further workup and ultimately surgical intervention  We have ordered routine preoperative laboratory studies and ECG  Patient is scheduled for surgery 11/4/22 with VALENTINA Parekh  Pre op instruction provided to patient  He will hold Eliquis 2 days before surgery  Ana Lee was comfortable with our recommendations, and their questions were answered to their satisfaction  Thank you for allowing us to participate in the care of this patient  Patient declining GI referral for colonoscopy    SIGNATURE: Marquise Alva  DATE: October 20, 2022  TIME: 2:14 PM  Attestation signed by Kodi Escobar MD at 10/20/2022  3:27 PM:  Patient seen and evaluated with Sandrine Wolf PA-C  I agree with the above assessment and plan with the following additions  The patient is a 22-year-old man with history of cardiomyopathy status post Bi V ICD implant in October 2017, he has developed RV ICD lead malfunction and is in need of an extraction and lead exchange  He has been referred for lead extraction  I explained the findings to the patient and indication for the procedure, benefits, risks and possible complications  He understands and agrees to proceed with surgery  He will return for an elective procedure  This note was completed in part utilizing m-modal fluency direct voice recognition software     Grammatical errors, random word insertion, spelling mistakes, and incomplete sentences may be an occasional consequence of the system secondary to software limitations, ambient noise and hardware issues  At the time of dictation, efforts were made to edit, clarify and /or correct errors  Please read the chart carefully and recognize, using context, where substitutions have occurred  If you have any questions or concerns about the context, text or information contained within the body of this dictation, please contact myself, the provider, for further clarification

## 2022-10-20 NOTE — PATIENT INSTRUCTIONS
1  You will receive a phone call from the hospital between 2:00 PM and 8:00 PM the day prior to surgery to confirm arrival time and location  For surgery on Mondays, you will receive a call on Friday  2  Do not drink or eat anything after midnight the night before surgery  That includes no water, candy, gum, lozenges, Lifesavers, etc  We recommend you not eat any salty or fatty snack foods, consume alcohol or smoke the night before surgery  3  Continue taking aspirin but only 81 mg daily  4  If you take Coumadin and/or Plavix, discontinue it 5 days before surgery  5  If you are diabetic, do not take any of your diabetic pills the morning of surgery  If you take Lantus insulin, you may take it at your regularly scheduled time the day before surgery  Do not take any other insulins the morning of surgery  6  The 2 nights before surgery, take a shower each night using the special antiseptic soap or soap sponges you received from the office or hospital  Marisol Songster your hair with regular shampoo and rinse completely before using the antiseptic sponges  Use the sponge to wash from your neck down, with special attention to the armpits and groin area  Do not use any other soap or cleanser on your skin  Do not use lotions, powder, deodorant or perfume of any kind on your skin after you shower  Use clean bed linens and wear clean pajamas after your shower  7  You will be prescribed Mupirocin nasal ointment  Apply to both nostrils twice a day for 5 days prior to surgery  8  Do not take a shower the morning of surgery; you'll be given a special" bath" at the hospital   9  Notify the CT surgery office if you develop a cold, sore throat, cough, fever or other health issues before your surgery    10  Other medication changes included the following: Stop Eliquis 2 days before surgery

## 2022-10-20 NOTE — H&P (VIEW-ONLY)
Pre-Op History & Physical - Cardiothoracic Surgery   Marbella Aguirre 45 y o  male MRN: [id number]    Physician Requesting Consult: Dr Teresa Dillon    Reason for Consult / Principal Problem: Pacemaker battery depletion; LV lead high threshold    History of Present Illness: Marbella Aguirre is a 45y o  year old male referred for surgical consultation for device and lead extraction to facilitate device and lead upgrade  Patient's PMHx is notable for NICMP s/p BiV ICD (6/5074), chronic systolic CHF,  NSVT,  COPD, (+) active tobacco abuse, MARRY (no CPAP), HTN, HLD, CKD2, h/o saddle PE/ LE DVT 7/2021 (Eliquis), PAD (50-75% R CFA stenosis), venous insufficiency w/ chronic LE edema, pre-Diabetes (A1c 5 8%), hypothyroidism, obesity (BMI 31 14) and h/o alcohol abuse  Patient first diagnosed with cardiomyopathy when he presented to [facility] in 2017 with progressive SOB and cough  Echo demonstrated dilated cardiomyopathy with an EF 10-15%  Ischemic w/u (-), BiV ICD implanted 3/2017  Patient has longstanding h/o tobacco abuse (started smoking at age 4, and at one point 3 ppd) currently 1 ppd and daily/heavy ETOH abuse, cutting down and currently admits to a 6-pack of beer weekly  Most recent echo demonstrates an EF 45%  He is on Eliquis for saddle PE July 2021  He has chronic LE edema  He has chronic SOB  He does not sleep well  He Blayne chest pain, ICD shocks, palpitations or syncope       Past Medical History:  Past Medical History:   Diagnosis Date   • Anxiety and depression    • CHF (congestive heart failure) ([address] )    • COPD (chronic obstructive pulmonary disease) ([facility])    • Dilated cardiomyopathy (HCC)    • Hypertension    • MARRY (obstructive sleep apnea)          Past Surgical History:   Past Surgical History:   Procedure Laterality Date   • BRONCHOSCOPY  03/10/2017   • CARDIAC PACEMAKER PLACEMENT  10/04/2017   • IR UPPER EXTREMITY VENOGRAM- DIAGNOSTIC  9/7/2022         Family History:  Family History   Problem Relation Age of Onset   • Diabetes Mother    • Emphysema Mother    • No Known Problems Father    • Emphysema Maternal Aunt    • Stroke Neg Hx          Social History:    Social History     Substance and Sexual Activity   Alcohol Use Yes    Comment: Socially, several times per week     Social History     Substance and Sexual Activity   Drug Use No     Social History     Tobacco Use   Smoking Status Current Every Day Smoker   • Packs/day: 1 00   • Years: 45 00   • Pack years: 45 00   • Types: Cigarettes   • Start date: 1976   Smokeless Tobacco Former User   • Types: Chew   • Quit date: 1984   Tobacco Comment    20 cigs daily as of  07/21/2022       Home Medications:   Prior to Admission medications    Medication Sig Start Date End Date Taking?  Authorizing Provider   albuterol (PROVENTIL HFA,VENTOLIN HFA) 90 mcg/act inhaler Inhale 2 puffs every 6 (six) hours as needed for wheezing or shortness of breath 9/30/20  Yes Magdalene Gregory,    apixaban (Eliquis) 2 5 mg Take 1 tablet (2 5 mg total) by mouth 2 (two) times a day 9/6/22  Yes SHERRY Zendejas   aspirin 81 mg chewable tablet Chew 1 tablet (81 mg total) daily 3/19/18  Yes Franny Alejandra,    clonazePAM (KlonoPIN) 1 mg tablet Take 1 tablet (1 mg total) by mouth 2 (two) times a day 8/30/22  Yes SHERRY Zendejas   Empagliflozin (Jardiance) 10 MG TABS Take 1 tablet (10 mg total) by mouth every morning 9/6/22  Yes SHERRY Zendejas   gabapentin (NEURONTIN) 300 mg capsule Take 1 capsule (300 mg total) by mouth 2 (two) times a day 5/2/22  Yes SHERRY Zendejas   lamoTRIgine (LaMICtal) 100 mg tablet take 1 tablet by mouth twice a day 9/8/22  Yes SHERRY Zendejas   levothyroxine 50 mcg tablet take 1 tablet by mouth once daily 9/26/22  Yes SHERRY Zendejas   metoprolol succinate (TOPROL-XL) 100 mg 24 hr tablet Take 1 tablet (100 mg total) by mouth 2 (two) times a day 6/8/22  Yes SHERRY Zendejas   nitroglycerin (NITROSTAT) 0 4 mg SL tablet Place 1 tablet (0 4 mg total) under the tongue every 5 (five) minutes as needed for chest pain 9/30/20  Yes Dayo Cabrera DO   potassium chloride (Klor-Con M20) 20 mEq tablet Take 1 tablet (20 mEq total) by mouth daily 9/23/21  Yes Dayo Cabrera DO   sacubitril-valsartan Alma Lauren) 49-51 MG TABS Take 1 tablet by mouth 2 (two) times a day 6/29/22  Yes Hoang Cosme DO   tiotropium (Spiriva Respimat) 2 5 MCG/ACT AERS inhaler Inhale 2 puffs daily 7/21/22  Yes Stephanie Benedict MD   Torsemide 40 MG TABS Take 40 mg by mouth 2 (two) times a day 9/2/22  Yes Hoang Cosme DO   QUEtiapine (SEROquel) 25 mg tablet Take 1 tablet (25 mg total) by mouth daily at bedtime  Patient not taking: No sig reported 6/1/22   SHERRY Olguin       Allergies:   Allergies   Allergen Reactions   • Chantix [Varenicline] Other (See Comments)     Depression       Review of Systems:  Review of Systems - History obtained from chart review and the patient  General ROS: positive for  - fatigue and sleep distrubances  negative for - chills or fever  Psychological ROS: negative  Ophthalmic ROS: positive for - uses glasses  ENT ROS: positive for - poor dentition- lower teeth, upper dentures  Allergy and Immunology ROS: negative  Hematological and Lymphatic ROS: negative  Endocrine ROS: negative  Breast ROS: negative  Respiratory ROS: positive for - shortness of breath  negative for - cough, hemoptysis, orthopnea or pleuritic pain  Cardiovascular ROS: positive for - dyspnea on exertion, edema and shortness of breath  negative for - chest pain, irregular heartbeat, loss of consciousness, orthopnea, palpitations or paroxysmal nocturnal dyspnea  Gastrointestinal ROS: no abdominal pain, change in bowel habits, or black or bloody stools  Genito-Urinary ROS: no dysuria, trouble voiding, or hematuria  Musculoskeletal ROS: negative  Neurological ROS: no TIA or stroke symptoms  Dermatological ROS: negative    Vital Signs:     Vitals: 10/20/22 1347 10/20/22 1355   BP: 135/80 139/85   BP Location: Left arm Right arm   Patient Position: Sitting Sitting   Cuff Size: Large Large   Pulse: 103    Temp: (!) 97 °F (36 1 °C)    TempSrc: Tympanic    SpO2: 100%    Weight: 107 kg (236 lb)    Height: 6' 1" (1 854 m)        Physical Exam:    General: Alert, oriented, obese, no acute distress  HEENT/NECK:  PERRLA  No jugular venous distention  Cardiac:Regular rate and rhythm, no murmurs rubs or gallops  PPM site left upper anterior chest   Carotid arteries: 2+ pulses, no bruits  Pulmonary:  Breath sounds clear bilaterally  Abdomen:  Non-tender, Non-distended  Positive bowel sounds  Upper extremities: 2+ radial pulses; brisk capillary refill  Lower extremities: Extremities warm/dry  PT/DP pulses unable to palpate pulses due to significant edema bilaterally  3-4+ edema B/L, L> R  Neuro: Alert and oriented X 3  Sensation is grossly intact  No focal deficits  Musculoskeletal: MAEE, stable gait  Skin: Warm/Dry, without rashes or lesions  Lab Results:   Lab Results   Component Value Date    SODIUM 135 08/16/2022    K 4 0 08/16/2022    CL 98 08/16/2022    CO2 30 08/16/2022    AGAP 7 08/16/2022    BUN 12 08/16/2022    CREATININE 1 06 08/16/2022    GLUC 123 03/18/2022    GLUF 111 (H) 08/16/2022    CALCIUM 8 9 08/16/2022    AST 13 08/16/2022    ALT 11 08/16/2022    ALKPHOS 58 08/16/2022    TP 6 5 08/16/2022    TBILI 0 72 08/16/2022    EGFR 79 08/16/2022     Lab Results   Component Value Date    HGBA1C 5 8 (H) 08/16/2022     Lab Results   Component Value Date    TROPONINI <0 03 07/15/2021       Imaging Studies:     Cardiac Catheterization: 2017    No CAD    Echocardiogram: 7/14/22    •  Left Ventricle: Left ventricular cavity size is normal  Wall thickness is normal  The left ventricular ejection fraction is 45%  Systolic function is mildly reduced  There is mild global hypokinesis    •  IVS: There is abnormal septal motion consistent with left bundle branch block   •  Mitral Valve: There is mild regurgitation      Findings    Left Ventricle Left ventricular cavity size is normal  Wall thickness is normal  The left ventricular ejection fraction is 45%  Systolic function is mildly reduced  There is mild global hypokinesis  Unable to assess diastolic function due to E/A fusion  Interventricular Septum There is abnormal septal motion consistent with left bundle branch block  Right Ventricle Right ventricular cavity size is normal  Systolic function is normal  Wall thickness is normal  A pacer wire is present  Left Atrium The atrium is normal in size  Right Atrium The atrium is normal in size  Aortic Valve The aortic valve is trileaflet  The leaflets are not thickened  The leaflets are not calcified  The leaflets exhibit normal mobility  There is no evidence of regurgitation  The aortic valve has no significant stenosis  Mitral Valve The mitral valve has normal structure and function  There is mild regurgitation  There is no evidence of stenosis  Tricuspid Valve Tricuspid valve structure is normal  There is no evidence of regurgitation  There is no evidence of stenosis  Pulmonic Valve Pulmonic valve structure is normal  There is no evidence of regurgitation  There is no evidence of stenosis  Ascending Aorta The aortic root is normal in size  IVC/SVC The inferior vena cava is normal in size  Pericardium There is no pericardial effusion  The pericardium is normal in appearance         Left Ventricle Measurements    Function/Volumes   A4C EF 46 %         LV Diastolic Volume (BP) 110 mL         LV Systolic Volume (BP) 92 mL         EF 48 %         Dimensions   LVIDd 5 1 cm         LVIDS 4 1 cm         IVSd 1 1 cm         LVPWd 1 1 cm         FS 20 %               Right Ventricle Measurements    Dimensions   RVID d 3 4 cm               Left Atrium Measurements    Dimensions   LA size 3 9 cm         LA length (A2C) 4 6 cm         URIEL A4C 18 1 cm2 Right Atrium Measurements    Dimensions   RAA A4C 14 1 cm2               Aorta Measurements    Aortic Dimensions   Ao root 4 2 cm         Asc Ao 3 4 cm               CT Scan: 7/11/22     Some of the previously described pulmonary nodules now appear to be associated with areas of linear scarring or subsegmental atelectasis  A component of this may be related to patient's prior pulmonary emboli      Additional visualized pulmonary nodules appear stable from prior  Patient should continue with low-dose screening CT      Emphysema and mild diffuse bronchial wall thickening  I have personally reviewed pertinent films in PACS     PCP and Cardiology notes reviewed      Assessment:  Patient Active Problem List    Diagnosis Date Noted   • Dilated cardiomyopathy ([place] [address] )    • Pacemaker battery depletion    • Pacemaker lead failure    • NSVT (nonsustained ventricular tachycardia) 03/08/2022   • Stage 2 chronic kidney disease 11/20/2021   • Hyperlipidemia 11/20/2021   • Elevated hemoglobin ([place] [address] ) 11/20/2021   • Personal history of DVT (deep vein thrombosis) 11/20/2021   • Pulmonary Embolism July 2021 11/20/2021   • Neuropathy 08/15/2021   • Severe obesity (BMI 35 0-39  9) with comorbidity ([place] [address] ) 08/02/2021   • Pre-diabetes 08/02/2021   • Hypothyroidism 07/26/2021   • Moderate COPD (chronic obstructive pulmonary disease) (HCC)    • MARRY (obstructive sleep apnea)    • Atherosclerosis of native arteries of right leg with ulceration of other part of foot ([place] [address] ) 03/22/2018   • Peripheral arterial disease ([place] [address] ) 02/20/2018   • Cardiomyopathy, dilated ([place] [address] ) 08/10/2017   • LBBB (left bundle branch block) 08/09/2017   • Chronic systolic CHF (congestive heart failure), NYHA class 3 ([place] [address] ) 06/30/2017   • HTN (hypertension) 06/28/2017   • Alcohol abuse 06/28/2017   • Tobacco use 06/28/2017         Impression/Plan:    NICMP s/p BiV ICD,  battery depletion  LV lead high threshold        Risks and benefits of lead extraction were discussed in detail today with the patient  He understands and wishes to proceed with further workup and ultimately surgical intervention  We have ordered routine preoperative laboratory studies and ECG  Patient is scheduled for surgery 11/4/22 with VALENTINA Garcia  Pre op instruction provided to patient  He will hold Eliquis 2 days before surgery  Allydoug Langston was comfortable with our recommendations, and their questions were answered to their satisfaction  Thank you for allowing us to participate in the care of this patient       Patient declining GI referral for colonoscopy    SIGNATURE: Katia Slot  DATE: October 20, 2022  TIME: 2:14 PM

## 2022-10-20 NOTE — H&P
Pre-Op History & Physical - Cardiothoracic Surgery   Santiago Ac 45 y o  male MRN: [id number]    Physician Requesting Consult: Dr Mahnaz Mar    Reason for Consult / Principal Problem: Pacemaker battery depletion; LV lead high threshold    History of Present Illness: Santiago Ac is a 45y o  year old male referred for surgical consultation for device and lead extraction to facilitate device and lead upgrade  Patient's PMHx is notable for NICMP s/p BiV ICD (3/0708), chronic systolic CHF,  NSVT,  COPD, (+) active tobacco abuse, MARRY (no CPAP), HTN, HLD, CKD2, h/o saddle PE/ LE DVT 7/2021 (Eliquis), PAD (50-75% R CFA stenosis), venous insufficiency w/ chronic LE edema, pre-Diabetes (A1c 5 8%), hypothyroidism, obesity (BMI 31 14) and h/o alcohol abuse  Patient first diagnosed with cardiomyopathy when he presented to [facility] in 2017 with progressive SOB and cough  Echo demonstrated dilated cardiomyopathy with an EF 10-15%  Ischemic w/u (-), BiV ICD implanted 3/2017  Patient has longstanding h/o tobacco abuse (started smoking at age 4, and at one point 3 ppd) currently 1 ppd and daily/heavy ETOH abuse, cutting down and currently admits to a 6-pack of beer weekly  Most recent echo demonstrates an EF 45%  He is on Eliquis for saddle PE July 2021  He has chronic LE edema  He has chronic SOB  He does not sleep well  He Blayne chest pain, ICD shocks, palpitations or syncope       Past Medical History:  Past Medical History:   Diagnosis Date   • Anxiety and depression    • CHF (congestive heart failure) ([facility] [address] )    • COPD (chronic obstructive pulmonary disease) ([facility])    • Dilated cardiomyopathy (HCC)    • Hypertension    • MARRY (obstructive sleep apnea)          Past Surgical History:   Past Surgical History:   Procedure Laterality Date   • BRONCHOSCOPY  03/10/2017   • CARDIAC PACEMAKER PLACEMENT  10/04/2017   • IR UPPER EXTREMITY VENOGRAM- DIAGNOSTIC  9/7/2022         Family History:  Family History   Problem Relation Age of Onset   • Diabetes Mother    • Emphysema Mother    • No Known Problems Father    • Emphysema Maternal Aunt    • Stroke Neg Hx          Social History:    Social History     Substance and Sexual Activity   Alcohol Use Yes    Comment: Socially, several times per week     Social History     Substance and Sexual Activity   Drug Use No     Social History     Tobacco Use   Smoking Status Current Every Day Smoker   • Packs/day: 1 00   • Years: 45 00   • Pack years: 45 00   • Types: Cigarettes   • Start date: 1976   Smokeless Tobacco Former User   • Types: Chew   • Quit date: 1984   Tobacco Comment    20 cigs daily as of  07/21/2022       Home Medications:   Prior to Admission medications    Medication Sig Start Date End Date Taking?  Authorizing Provider   albuterol (PROVENTIL HFA,VENTOLIN HFA) 90 mcg/act inhaler Inhale 2 puffs every 6 (six) hours as needed for wheezing or shortness of breath 9/30/20  Yes Marcin Craven, DO   apixaban (Eliquis) 2 5 mg Take 1 tablet (2 5 mg total) by mouth 2 (two) times a day 9/6/22  Yes SHERRY Andres   aspirin 81 mg chewable tablet Chew 1 tablet (81 mg total) daily 3/19/18  Yes Kayce Cunningham,    clonazePAM (KlonoPIN) 1 mg tablet Take 1 tablet (1 mg total) by mouth 2 (two) times a day 8/30/22  Yes SHERRY Andres   Empagliflozin (Jardiance) 10 MG TABS Take 1 tablet (10 mg total) by mouth every morning 9/6/22  Yes SHERRY Andres   gabapentin (NEURONTIN) 300 mg capsule Take 1 capsule (300 mg total) by mouth 2 (two) times a day 5/2/22  Yes SHERRY Andres   lamoTRIgine (LaMICtal) 100 mg tablet take 1 tablet by mouth twice a day 9/8/22  Yes SHERRY Andres   levothyroxine 50 mcg tablet take 1 tablet by mouth once daily 9/26/22  Yes SHERRY Andres   metoprolol succinate (TOPROL-XL) 100 mg 24 hr tablet Take 1 tablet (100 mg total) by mouth 2 (two) times a day 6/8/22  Yes SHERRY Andres   nitroglycerin (NITROSTAT) 0 4 mg SL tablet Place 1 tablet (0 4 mg total) under the tongue every 5 (five) minutes as needed for chest pain 9/30/20  Yes Rylie West DO   potassium chloride (Klor-Con M20) 20 mEq tablet Take 1 tablet (20 mEq total) by mouth daily 9/23/21  Yes Rylie West DO   sacubitril-valsartan Freedom Moodus) 49-51 MG TABS Take 1 tablet by mouth 2 (two) times a day 6/29/22  Yes Narendra Walker DO   tiotropium (Spiriva Respimat) 2 5 MCG/ACT AERS inhaler Inhale 2 puffs daily 7/21/22  Yes Brunilda Hare MD   Torsemide 40 MG TABS Take 40 mg by mouth 2 (two) times a day 9/2/22  Yes Narendra Walker DO   QUEtiapine (SEROquel) 25 mg tablet Take 1 tablet (25 mg total) by mouth daily at bedtime  Patient not taking: No sig reported 6/1/22   SHERRY Velasquez       Allergies:   Allergies   Allergen Reactions   • Chantix [Varenicline] Other (See Comments)     Depression       Review of Systems:  Review of Systems - History obtained from chart review and the patient  General ROS: positive for  - fatigue and sleep distrubances  negative for - chills or fever  Psychological ROS: negative  Ophthalmic ROS: positive for - uses glasses  ENT ROS: positive for - poor dentition- lower teeth, upper dentures  Allergy and Immunology ROS: negative  Hematological and Lymphatic ROS: negative  Endocrine ROS: negative  Breast ROS: negative  Respiratory ROS: positive for - shortness of breath  negative for - cough, hemoptysis, orthopnea or pleuritic pain  Cardiovascular ROS: positive for - dyspnea on exertion, edema and shortness of breath  negative for - chest pain, irregular heartbeat, loss of consciousness, orthopnea, palpitations or paroxysmal nocturnal dyspnea  Gastrointestinal ROS: no abdominal pain, change in bowel habits, or black or bloody stools  Genito-Urinary ROS: no dysuria, trouble voiding, or hematuria  Musculoskeletal ROS: negative  Neurological ROS: no TIA or stroke symptoms  Dermatological ROS: negative    Vital Signs:     Vitals: 10/20/22 1347 10/20/22 1355   BP: 135/80 139/85   BP Location: Left arm Right arm   Patient Position: Sitting Sitting   Cuff Size: Large Large   Pulse: 103    Temp: (!) 97 °F (36 1 °C)    TempSrc: Tympanic    SpO2: 100%    Weight: 107 kg (236 lb)    Height: 6' 1" (1 854 m)        Physical Exam:    General: Alert, oriented, obese, no acute distress  HEENT/NECK:  PERRLA  No jugular venous distention  Cardiac:Regular rate and rhythm, no murmurs rubs or gallops  PPM site left upper anterior chest   Carotid arteries: 2+ pulses, no bruits  Pulmonary:  Breath sounds clear bilaterally  Abdomen:  Non-tender, Non-distended  Positive bowel sounds  Upper extremities: 2+ radial pulses; brisk capillary refill  Lower extremities: Extremities warm/dry  PT/DP pulses unable to palpate pulses due to significant edema bilaterally  3-4+ edema B/L, L> R  Neuro: Alert and oriented X 3  Sensation is grossly intact  No focal deficits  Musculoskeletal: MAEE, stable gait  Skin: Warm/Dry, without rashes or lesions  Lab Results:   Lab Results   Component Value Date    SODIUM 135 08/16/2022    K 4 0 08/16/2022    CL 98 08/16/2022    CO2 30 08/16/2022    AGAP 7 08/16/2022    BUN 12 08/16/2022    CREATININE 1 06 08/16/2022    GLUC 123 03/18/2022    GLUF 111 (H) 08/16/2022    CALCIUM 8 9 08/16/2022    AST 13 08/16/2022    ALT 11 08/16/2022    ALKPHOS 58 08/16/2022    TP 6 5 08/16/2022    TBILI 0 72 08/16/2022    EGFR 79 08/16/2022     Lab Results   Component Value Date    HGBA1C 5 8 (H) 08/16/2022     Lab Results   Component Value Date    TROPONINI <0 03 07/15/2021       Imaging Studies:     Cardiac Catheterization: 2017    No CAD    Echocardiogram: 7/14/22    •  Left Ventricle: Left ventricular cavity size is normal  Wall thickness is normal  The left ventricular ejection fraction is 45%  Systolic function is mildly reduced  There is mild global hypokinesis    •  IVS: There is abnormal septal motion consistent with left bundle branch block   •  Mitral Valve: There is mild regurgitation      Findings    Left Ventricle Left ventricular cavity size is normal  Wall thickness is normal  The left ventricular ejection fraction is 45%  Systolic function is mildly reduced  There is mild global hypokinesis  Unable to assess diastolic function due to E/A fusion  Interventricular Septum There is abnormal septal motion consistent with left bundle branch block  Right Ventricle Right ventricular cavity size is normal  Systolic function is normal  Wall thickness is normal  A pacer wire is present  Left Atrium The atrium is normal in size  Right Atrium The atrium is normal in size  Aortic Valve The aortic valve is trileaflet  The leaflets are not thickened  The leaflets are not calcified  The leaflets exhibit normal mobility  There is no evidence of regurgitation  The aortic valve has no significant stenosis  Mitral Valve The mitral valve has normal structure and function  There is mild regurgitation  There is no evidence of stenosis  Tricuspid Valve Tricuspid valve structure is normal  There is no evidence of regurgitation  There is no evidence of stenosis  Pulmonic Valve Pulmonic valve structure is normal  There is no evidence of regurgitation  There is no evidence of stenosis  Ascending Aorta The aortic root is normal in size  IVC/SVC The inferior vena cava is normal in size  Pericardium There is no pericardial effusion  The pericardium is normal in appearance         Left Ventricle Measurements    Function/Volumes   A4C EF 46 %         LV Diastolic Volume (BP) 675 mL         LV Systolic Volume (BP) 92 mL         EF 48 %         Dimensions   LVIDd 5 1 cm         LVIDS 4 1 cm         IVSd 1 1 cm         LVPWd 1 1 cm         FS 20 %               Right Ventricle Measurements    Dimensions   RVID d 3 4 cm               Left Atrium Measurements    Dimensions   LA size 3 9 cm         LA length (A2C) 4 6 cm         URIEL A4C 18 1 cm2 Right Atrium Measurements    Dimensions   RAA A4C 14 1 cm2               Aorta Measurements    Aortic Dimensions   Ao root 4 2 cm         Asc Ao 3 4 cm               CT Scan: 7/11/22     Some of the previously described pulmonary nodules now appear to be associated with areas of linear scarring or subsegmental atelectasis  A component of this may be related to patient's prior pulmonary emboli      Additional visualized pulmonary nodules appear stable from prior  Patient should continue with low-dose screening CT      Emphysema and mild diffuse bronchial wall thickening  I have personally reviewed pertinent films in PACS     PCP and Cardiology notes reviewed      Assessment:  Patient Active Problem List    Diagnosis Date Noted   • Dilated cardiomyopathy ([place] [address] )    • Pacemaker battery depletion    • Pacemaker lead failure    • NSVT (nonsustained ventricular tachycardia) 03/08/2022   • Stage 2 chronic kidney disease 11/20/2021   • Hyperlipidemia 11/20/2021   • Elevated hemoglobin ([place] [address] ) 11/20/2021   • Personal history of DVT (deep vein thrombosis) 11/20/2021   • Pulmonary Embolism July 2021 11/20/2021   • Neuropathy 08/15/2021   • Severe obesity (BMI 35 0-39  9) with comorbidity ([place] [address] ) 08/02/2021   • Pre-diabetes 08/02/2021   • Hypothyroidism 07/26/2021   • Moderate COPD (chronic obstructive pulmonary disease) (HCC)    • MARRY (obstructive sleep apnea)    • Atherosclerosis of native arteries of right leg with ulceration of other part of foot ([place] [address] ) 03/22/2018   • Peripheral arterial disease ([place] [address] ) 02/20/2018   • Cardiomyopathy, dilated ([place] [address] ) 08/10/2017   • LBBB (left bundle branch block) 08/09/2017   • Chronic systolic CHF (congestive heart failure), NYHA class 3 ([place] [address] ) 06/30/2017   • HTN (hypertension) 06/28/2017   • Alcohol abuse 06/28/2017   • Tobacco use 06/28/2017         Impression/Plan:    NICMP s/p BiV ICD,  battery depletion  LV lead high threshold        Risks and benefits of lead extraction were discussed in detail today with the patient  He understands and wishes to proceed with further workup and ultimately surgical intervention  We have ordered routine preoperative laboratory studies and ECG  Patient is scheduled for surgery 11/4/22 with VALENTINA Martino  Pre op instruction provided to patient  He will hold Eliquis 2 days before surgery  Brinda Christina was comfortable with our recommendations, and their questions were answered to their satisfaction  Thank you for allowing us to participate in the care of this patient       Patient declining GI referral for colonoscopy    SIGNATURE: Martín Lucio  DATE: October 20, 2022  TIME: 2:14 PM

## 2022-10-28 ENCOUNTER — OFFICE VISIT (OUTPATIENT)
Dept: LAB | Facility: CLINIC | Age: 52
End: 2022-10-28
Payer: MEDICARE

## 2022-10-28 ENCOUNTER — LAB REQUISITION (OUTPATIENT)
Dept: LAB | Facility: HOSPITAL | Age: 52
End: 2022-10-28
Payer: MEDICARE

## 2022-10-28 ENCOUNTER — APPOINTMENT (OUTPATIENT)
Dept: LAB | Facility: CLINIC | Age: 52
End: 2022-10-28
Payer: MEDICARE

## 2022-10-28 DIAGNOSIS — I42.0 CARDIOMYOPATHY, DILATED (HCC): Chronic | ICD-10-CM

## 2022-10-28 DIAGNOSIS — T82.110A FAILURE OF PACEMAKER LEAD, INITIAL ENCOUNTER: ICD-10-CM

## 2022-10-28 DIAGNOSIS — Z45.010 PACEMAKER BATTERY DEPLETION: ICD-10-CM

## 2022-10-28 DIAGNOSIS — I42.0 DILATED CARDIOMYOPATHY (HCC): ICD-10-CM

## 2022-10-28 LAB
ABO GROUP BLD: NORMAL
ANION GAP SERPL CALCULATED.3IONS-SCNC: 9 MMOL/L (ref 4–13)
BLD GP AB SCN SERPL QL: NEGATIVE
BUN SERPL-MCNC: 9 MG/DL (ref 5–25)
CALCIUM SERPL-MCNC: 9.4 MG/DL (ref 8.4–10.2)
CHLORIDE SERPL-SCNC: 104 MMOL/L (ref 96–108)
CO2 SERPL-SCNC: 24 MMOL/L (ref 21–32)
CREAT SERPL-MCNC: 0.82 MG/DL (ref 0.6–1.3)
ERYTHROCYTE [DISTWIDTH] IN BLOOD BY AUTOMATED COUNT: 14.9 % (ref 11.6–15.1)
GFR SERPL CREATININE-BSD FRML MDRD: 100 ML/MIN/1.73SQ M
GLUCOSE SERPL-MCNC: 101 MG/DL (ref 65–140)
HCT VFR BLD AUTO: 51.8 % (ref 36.5–49.3)
HGB BLD-MCNC: 17.3 G/DL (ref 12–17)
INR PPP: 0.99 (ref 0.84–1.19)
MCH RBC QN AUTO: 31 PG (ref 26.8–34.3)
MCHC RBC AUTO-ENTMCNC: 33.4 G/DL (ref 31.4–37.4)
MCV RBC AUTO: 93 FL (ref 82–98)
PLATELET # BLD AUTO: 279 THOUSANDS/UL (ref 149–390)
PMV BLD AUTO: 9.7 FL (ref 8.9–12.7)
POTASSIUM SERPL-SCNC: 4.2 MMOL/L (ref 3.5–5.3)
PROTHROMBIN TIME: 13.3 SECONDS (ref 11.6–14.5)
RBC # BLD AUTO: 5.58 MILLION/UL (ref 3.88–5.62)
RH BLD: POSITIVE
SODIUM SERPL-SCNC: 137 MMOL/L (ref 135–147)
SPECIMEN EXPIRATION DATE: NORMAL
WBC # BLD AUTO: 10.71 THOUSAND/UL (ref 4.31–10.16)

## 2022-10-28 PROCEDURE — 85027 COMPLETE CBC AUTOMATED: CPT

## 2022-10-28 PROCEDURE — 36415 COLL VENOUS BLD VENIPUNCTURE: CPT

## 2022-10-28 PROCEDURE — 86900 BLOOD TYPING SEROLOGIC ABO: CPT | Performed by: THORACIC SURGERY (CARDIOTHORACIC VASCULAR SURGERY)

## 2022-10-28 PROCEDURE — 86850 RBC ANTIBODY SCREEN: CPT | Performed by: THORACIC SURGERY (CARDIOTHORACIC VASCULAR SURGERY)

## 2022-10-28 PROCEDURE — 80048 BASIC METABOLIC PNL TOTAL CA: CPT

## 2022-10-28 PROCEDURE — 85610 PROTHROMBIN TIME: CPT

## 2022-10-28 PROCEDURE — 93005 ELECTROCARDIOGRAM TRACING: CPT

## 2022-10-28 PROCEDURE — 86901 BLOOD TYPING SEROLOGIC RH(D): CPT | Performed by: THORACIC SURGERY (CARDIOTHORACIC VASCULAR SURGERY)

## 2022-10-29 LAB
ATRIAL RATE: 95 BPM
P AXIS: 73 DEGREES
PR INTERVAL: 148 MS
QRS AXIS: -51 DEGREES
QRSD INTERVAL: 180 MS
QT INTERVAL: 470 MS
QTC INTERVAL: 590 MS
T WAVE AXIS: 96 DEGREES
VENTRICULAR RATE: 95 BPM

## 2022-10-31 ENCOUNTER — ANESTHESIA EVENT (OUTPATIENT)
Dept: PERIOP | Facility: HOSPITAL | Age: 52
End: 2022-10-31

## 2022-11-02 DIAGNOSIS — G62.9 NEUROPATHY: ICD-10-CM

## 2022-11-02 RX ORDER — GABAPENTIN 300 MG/1
CAPSULE ORAL
Qty: 180 CAPSULE | Refills: 1 | Status: SHIPPED | OUTPATIENT
Start: 2022-11-02

## 2022-11-04 ENCOUNTER — APPOINTMENT (OUTPATIENT)
Dept: NON INVASIVE DIAGNOSTICS | Facility: HOSPITAL | Age: 52
End: 2022-11-04
Attending: THORACIC SURGERY (CARDIOTHORACIC VASCULAR SURGERY)

## 2022-11-04 ENCOUNTER — HOSPITAL ENCOUNTER (OUTPATIENT)
Facility: HOSPITAL | Age: 52
Setting detail: OUTPATIENT SURGERY
Discharge: HOME | End: 2022-11-05
Attending: THORACIC SURGERY (CARDIOTHORACIC VASCULAR SURGERY) | Admitting: THORACIC SURGERY (CARDIOTHORACIC VASCULAR SURGERY)

## 2022-11-04 ENCOUNTER — ANESTHESIA (OUTPATIENT)
Dept: PERIOP | Facility: HOSPITAL | Age: 52
End: 2022-11-04

## 2022-11-04 ENCOUNTER — APPOINTMENT (OUTPATIENT)
Dept: RADIOLOGY | Facility: HOSPITAL | Age: 52
End: 2022-11-04

## 2022-11-04 DIAGNOSIS — T82.110D FAILURE OF PACEMAKER LEAD, SUBSEQUENT ENCOUNTER: Primary | ICD-10-CM

## 2022-11-04 DIAGNOSIS — I48.91 A-FIB (HCC): ICD-10-CM

## 2022-11-04 DIAGNOSIS — I42.0 CARDIOMYOPATHY, DILATED (HCC): ICD-10-CM

## 2022-11-04 DIAGNOSIS — T82.110A FAILURE OF PACEMAKER LEAD, INITIAL ENCOUNTER: ICD-10-CM

## 2022-11-04 DIAGNOSIS — Z45.010 PACEMAKER BATTERY DEPLETION: ICD-10-CM

## 2022-11-04 DIAGNOSIS — I42.0 DILATED CARDIOMYOPATHY (HCC): ICD-10-CM

## 2022-11-04 LAB
ABO GROUP BLD: NORMAL
RH BLD: POSITIVE

## 2022-11-04 DEVICE — ENVELOPE CMRM6133 ABSORB LRG MR
Type: IMPLANTABLE DEVICE | Site: CHEST | Status: FUNCTIONAL
Brand: TYRX™

## 2022-11-04 DEVICE — CRTD DTMA1D4 CLARIA MRI US DF-4
Type: IMPLANTABLE DEVICE | Site: HEART | Status: FUNCTIONAL
Brand: CLARIA MRI™ CRT-D SURESCAN™

## 2022-11-04 DEVICE — LEAD 459888 MRI S-TIP US
Type: IMPLANTABLE DEVICE | Site: HEART | Status: FUNCTIONAL
Brand: ATTAIN PERFORMA™ S MRI SURESCAN™

## 2022-11-04 DEVICE — LEAD 383069 MRI US
Type: IMPLANTABLE DEVICE | Site: HEART | Status: FUNCTIONAL
Brand: SELECTSECURE™ MRI SURESCAN™

## 2022-11-04 RX ORDER — ACETAMINOPHEN 325 MG/1
650 TABLET ORAL EVERY 4 HOURS PRN
Status: DISCONTINUED | OUTPATIENT
Start: 2022-11-04 | End: 2022-11-05 | Stop reason: HOSPADM

## 2022-11-04 RX ORDER — DEXMEDETOMIDINE HYDROCHLORIDE 100 UG/ML
INJECTION, SOLUTION INTRAVENOUS AS NEEDED
Status: DISCONTINUED | OUTPATIENT
Start: 2022-11-04 | End: 2022-11-04

## 2022-11-04 RX ORDER — CEFAZOLIN SODIUM 2 G/50ML
2000 SOLUTION INTRAVENOUS ONCE
Status: COMPLETED | OUTPATIENT
Start: 2022-11-04 | End: 2022-11-04

## 2022-11-04 RX ORDER — SODIUM CHLORIDE, SODIUM LACTATE, POTASSIUM CHLORIDE, CALCIUM CHLORIDE 600; 310; 30; 20 MG/100ML; MG/100ML; MG/100ML; MG/100ML
50 INJECTION, SOLUTION INTRAVENOUS CONTINUOUS
Status: DISCONTINUED | OUTPATIENT
Start: 2022-11-04 | End: 2022-11-05 | Stop reason: HOSPADM

## 2022-11-04 RX ORDER — ONDANSETRON 2 MG/ML
INJECTION INTRAMUSCULAR; INTRAVENOUS AS NEEDED
Status: DISCONTINUED | OUTPATIENT
Start: 2022-11-04 | End: 2022-11-04

## 2022-11-04 RX ORDER — ESMOLOL HYDROCHLORIDE 10 MG/ML
INJECTION INTRAVENOUS AS NEEDED
Status: DISCONTINUED | OUTPATIENT
Start: 2022-11-04 | End: 2022-11-04

## 2022-11-04 RX ORDER — ROCURONIUM BROMIDE 10 MG/ML
INJECTION, SOLUTION INTRAVENOUS AS NEEDED
Status: DISCONTINUED | OUTPATIENT
Start: 2022-11-04 | End: 2022-11-04

## 2022-11-04 RX ORDER — TORSEMIDE 20 MG/1
40 TABLET ORAL 2 TIMES DAILY
Status: DISCONTINUED | OUTPATIENT
Start: 2022-11-04 | End: 2022-11-05 | Stop reason: HOSPADM

## 2022-11-04 RX ORDER — GABAPENTIN 300 MG/1
300 CAPSULE ORAL 2 TIMES DAILY
Status: DISCONTINUED | OUTPATIENT
Start: 2022-11-04 | End: 2022-11-05 | Stop reason: HOSPADM

## 2022-11-04 RX ORDER — ONDANSETRON 2 MG/ML
4 INJECTION INTRAMUSCULAR; INTRAVENOUS ONCE AS NEEDED
Status: DISCONTINUED | OUTPATIENT
Start: 2022-11-04 | End: 2022-11-04 | Stop reason: HOSPADM

## 2022-11-04 RX ORDER — FENTANYL CITRATE 50 UG/ML
INJECTION, SOLUTION INTRAMUSCULAR; INTRAVENOUS AS NEEDED
Status: DISCONTINUED | OUTPATIENT
Start: 2022-11-04 | End: 2022-11-04

## 2022-11-04 RX ORDER — MIDAZOLAM HYDROCHLORIDE 2 MG/2ML
INJECTION, SOLUTION INTRAMUSCULAR; INTRAVENOUS AS NEEDED
Status: DISCONTINUED | OUTPATIENT
Start: 2022-11-04 | End: 2022-11-04

## 2022-11-04 RX ORDER — SODIUM CHLORIDE 9 MG/ML
INJECTION, SOLUTION INTRAVENOUS CONTINUOUS PRN
Status: DISCONTINUED | OUTPATIENT
Start: 2022-11-04 | End: 2022-11-04

## 2022-11-04 RX ORDER — ALBUTEROL SULFATE 90 UG/1
2 AEROSOL, METERED RESPIRATORY (INHALATION) EVERY 6 HOURS PRN
Status: DISCONTINUED | OUTPATIENT
Start: 2022-11-04 | End: 2022-11-05 | Stop reason: HOSPADM

## 2022-11-04 RX ORDER — PROPOFOL 10 MG/ML
INJECTION, EMULSION INTRAVENOUS AS NEEDED
Status: DISCONTINUED | OUTPATIENT
Start: 2022-11-04 | End: 2022-11-04

## 2022-11-04 RX ORDER — LEVOTHYROXINE SODIUM 0.05 MG/1
50 TABLET ORAL DAILY
Status: DISCONTINUED | OUTPATIENT
Start: 2022-11-05 | End: 2022-11-05 | Stop reason: HOSPADM

## 2022-11-04 RX ORDER — CLONAZEPAM 1 MG/1
1 TABLET ORAL 2 TIMES DAILY
Status: DISCONTINUED | OUTPATIENT
Start: 2022-11-04 | End: 2022-11-05 | Stop reason: HOSPADM

## 2022-11-04 RX ORDER — FENTANYL CITRATE/PF 50 MCG/ML
50 SYRINGE (ML) INJECTION
Status: DISCONTINUED | OUTPATIENT
Start: 2022-11-04 | End: 2022-11-04 | Stop reason: HOSPADM

## 2022-11-04 RX ORDER — LIDOCAINE HYDROCHLORIDE 20 MG/ML
INJECTION, SOLUTION EPIDURAL; INFILTRATION; INTRACAUDAL; PERINEURAL AS NEEDED
Status: DISCONTINUED | OUTPATIENT
Start: 2022-11-04 | End: 2022-11-04

## 2022-11-04 RX ORDER — ASPIRIN 81 MG/1
81 TABLET, CHEWABLE ORAL DAILY
Status: DISCONTINUED | OUTPATIENT
Start: 2022-11-05 | End: 2022-11-05 | Stop reason: HOSPADM

## 2022-11-04 RX ORDER — METOPROLOL SUCCINATE 100 MG/1
100 TABLET, EXTENDED RELEASE ORAL 2 TIMES DAILY
Status: DISCONTINUED | OUTPATIENT
Start: 2022-11-04 | End: 2022-11-05 | Stop reason: HOSPADM

## 2022-11-04 RX ORDER — POTASSIUM CHLORIDE 20 MEQ/1
20 TABLET, EXTENDED RELEASE ORAL DAILY
Status: DISCONTINUED | OUTPATIENT
Start: 2022-11-05 | End: 2022-11-05 | Stop reason: HOSPADM

## 2022-11-04 RX ORDER — PHENYLEPHRINE HYDROCHLORIDE 10 MG/ML
INJECTION INTRAVENOUS AS NEEDED
Status: DISCONTINUED | OUTPATIENT
Start: 2022-11-04 | End: 2022-11-04

## 2022-11-04 RX ORDER — LAMOTRIGINE 100 MG/1
100 TABLET ORAL 2 TIMES DAILY
Status: DISCONTINUED | OUTPATIENT
Start: 2022-11-04 | End: 2022-11-05 | Stop reason: HOSPADM

## 2022-11-04 RX ORDER — OXYCODONE HYDROCHLORIDE 5 MG/1
5 TABLET ORAL EVERY 6 HOURS PRN
Status: DISCONTINUED | OUTPATIENT
Start: 2022-11-04 | End: 2022-11-05 | Stop reason: HOSPADM

## 2022-11-04 RX ADMIN — FENTANYL CITRATE 50 MCG: 50 INJECTION INTRAMUSCULAR; INTRAVENOUS at 16:02

## 2022-11-04 RX ADMIN — ESMOLOL HYDROCHLORIDE 20 MG: 100 INJECTION, SOLUTION INTRAVENOUS at 16:32

## 2022-11-04 RX ADMIN — SODIUM CHLORIDE, SODIUM LACTATE, POTASSIUM CHLORIDE, AND CALCIUM CHLORIDE: .6; .31; .03; .02 INJECTION, SOLUTION INTRAVENOUS at 11:30

## 2022-11-04 RX ADMIN — FENTANYL CITRATE 50 MCG: 50 INJECTION INTRAMUSCULAR; INTRAVENOUS at 15:59

## 2022-11-04 RX ADMIN — ONDANSETRON 4 MG: 2 INJECTION INTRAMUSCULAR; INTRAVENOUS at 16:02

## 2022-11-04 RX ADMIN — LIDOCAINE HYDROCHLORIDE 100 MG: 20 INJECTION, SOLUTION EPIDURAL; INFILTRATION; INTRACAUDAL; PERINEURAL at 14:08

## 2022-11-04 RX ADMIN — FENTANYL CITRATE 50 MCG: 50 INJECTION INTRAMUSCULAR; INTRAVENOUS at 14:40

## 2022-11-04 RX ADMIN — SUGAMMADEX 200 MG: 100 INJECTION, SOLUTION INTRAVENOUS at 16:18

## 2022-11-04 RX ADMIN — DEXMEDETOMIDINE HCL 4 MCG: 100 INJECTION INTRAVENOUS at 14:10

## 2022-11-04 RX ADMIN — PHENYLEPHRINE HYDROCHLORIDE 30 MCG/MIN: 10 INJECTION INTRAVENOUS at 14:19

## 2022-11-04 RX ADMIN — MIDAZOLAM 2 MG: 1 INJECTION INTRAMUSCULAR; INTRAVENOUS at 13:45

## 2022-11-04 RX ADMIN — FENTANYL CITRATE 50 MCG: 50 INJECTION INTRAMUSCULAR; INTRAVENOUS at 14:07

## 2022-11-04 RX ADMIN — FENTANYL CITRATE 50 MCG: 50 INJECTION INTRAMUSCULAR; INTRAVENOUS at 14:12

## 2022-11-04 RX ADMIN — PHENYLEPHRINE HYDROCHLORIDE 20 MCG/MIN: 10 INJECTION INTRAVENOUS at 14:14

## 2022-11-04 RX ADMIN — ROCURONIUM BROMIDE 50 MG: 50 INJECTION INTRAVENOUS at 14:10

## 2022-11-04 RX ADMIN — DEXMEDETOMIDINE HCL 4 MCG: 100 INJECTION INTRAVENOUS at 14:39

## 2022-11-04 RX ADMIN — FENTANYL CITRATE 50 MCG: 50 INJECTION INTRAMUSCULAR; INTRAVENOUS at 15:10

## 2022-11-04 RX ADMIN — LAMOTRIGINE 100 MG: 100 TABLET ORAL at 22:28

## 2022-11-04 RX ADMIN — PROPOFOL 40 MG: 10 INJECTION, EMULSION INTRAVENOUS at 14:10

## 2022-11-04 RX ADMIN — PROPOFOL 100 MG: 10 INJECTION, EMULSION INTRAVENOUS at 14:08

## 2022-11-04 RX ADMIN — SODIUM CHLORIDE: 0.9 INJECTION, SOLUTION INTRAVENOUS at 14:15

## 2022-11-04 RX ADMIN — DEXMEDETOMIDINE HCL 8 MCG: 100 INJECTION INTRAVENOUS at 14:22

## 2022-11-04 RX ADMIN — ESMOLOL HYDROCHLORIDE 30 MG: 100 INJECTION, SOLUTION INTRAVENOUS at 16:04

## 2022-11-04 RX ADMIN — CEFAZOLIN SODIUM 2000 MG: 2 SOLUTION INTRAVENOUS at 14:00

## 2022-11-04 RX ADMIN — EPINEPHRINE 1 MCG/MIN: 1 INJECTION, SOLUTION, CONCENTRATE INTRAVENOUS at 14:56

## 2022-11-04 RX ADMIN — ROCURONIUM BROMIDE 20 MG: 50 INJECTION INTRAVENOUS at 14:40

## 2022-11-04 RX ADMIN — DEXMEDETOMIDINE HCL 8 MCG: 100 INJECTION INTRAVENOUS at 14:28

## 2022-11-04 RX ADMIN — DEXMEDETOMIDINE HCL 8 MCG: 100 INJECTION INTRAVENOUS at 14:35

## 2022-11-04 RX ADMIN — PHENYLEPHRINE HYDROCHLORIDE 100 MCG: 10 INJECTION INTRAVENOUS at 14:49

## 2022-11-04 RX ADMIN — DEXMEDETOMIDINE HCL 8 MCG: 100 INJECTION INTRAVENOUS at 14:17

## 2022-11-04 RX ADMIN — METOPROLOL SUCCINATE 100 MG: 100 TABLET, EXTENDED RELEASE ORAL at 22:29

## 2022-11-04 RX ADMIN — GABAPENTIN 300 MG: 300 CAPSULE ORAL at 22:28

## 2022-11-04 RX ADMIN — CLONAZEPAM 1 MG: 1 TABLET ORAL at 22:28

## 2022-11-04 NOTE — OP NOTE
OPERATIVE REPORT  PATIENT NAME: Christopher Barbosa    :  1970  MRN: [id number]  Pt Location: [facility] OR ROOM 02    SURGERY DATE: 2022    SURGEON: Felicita Gillis MD    CO-SURGEONDino Trinidad DO    ASSISTANT: N/A    PREOPERATIVE DIAGNOSIS:  Lead malfunction    POSTOPERATIVE DIAGNOSIS:  Lead malfunction    PROCEDURE:   Transvenous laser LV lead extraction  BiV ICD/pacemaker generator removal    INDICATIONS:  The patient is a 45y o  year-old male a BiV ICD for cardiomyopathy, the LV malfunctioned and is draining the generator battery        ANESTHESIA: GET  Dr Ponce Dark    FINDINGS:  There was no evidence of pocket infection  The lead was extracted intact  Final MARLENE showed no new pericardial effusion       OPERATIVE TECHNIQUE:  The patient was taken to the operating room, properly identified and placed supine on the operating table  GET anesthesia was used  The patient was prepped and draped in the usual sterile fashion  A time-out procedure was performed  We got access to the right femoral vein using Seldinger's technique, vascular sheath was introduced and secured in place  A guide wire was advanced into the right internal jugular vein for use in case of needing a SVC occlusion balloon  An incision was made over the generator, the generator was dissected out and removed from the pocket  The leads were freed up from the subcutaneous tissues  The left subclavian vein was accessed with Seldinger's technique, a guide wire was advanced into the RA  The lead locking screw was unlocked  The LV lead was cut using a lead cutting tool  E-Z locking stylet was placed in the lead  A pursestring suture was tied around the access site to eliminate bleeding  The lead was removed using 12 Fr Laser  The pursestring  was tied around the access site to eliminate bleeding  I refer to Debby Benson DO for dictation of BiV ICD/pacemaker implantation and rest of the procedure         COMPLICATIONS: None  PACKS/TUBES/DRAINS: None  EBL: 25  TRANSFUSION: None  SPECIMENS: Please see below for lead and device information for equipment that was removed  As the attending surgeon, I was present and scrubbed for all critical portions of this procedure  There was no qualified surgical resident available  Sponge, needle and instrument counts were reported as correct by the nursing staff             Zena Coon  DATE: November 4, 2022  TIME: 4:06 PM

## 2022-11-04 NOTE — DISCHARGE INSTRUCTIONS
Please refer to post device implantation discharge instructions and restrictions below and your device booklet/temporary card  Keep incision dry for one week  Do not use lotions, powders, ointments, or creams on the incision  Leave outer bandage in place for one week  The bandage is water proof  You may shower with it as long as it is fully adhered to your skin  If the bandage appears to be coming off or if there is an open gap in the bandage to the area of your incision, then please keep the whole area dry for the remaining week  After one week, please remove the bandage by gently pulling all edges away from the center and slowly remove it from your skin  Do not quickly rip off the bandage  No overhead reaching, pushing, or pulling with your left arm for 6 weeks  No lifting greater than 10 lbs with your left arm for 6 weeks  No driving for 48 hours  Please call the office if you notice redness, swelling, bleeding, or drainage from incision or if you develop fevers  Cardiac Resynchronization Therapy (CRT)    If you have any questions, please call [phone] to speak with a nurse (8:30am-4pm, or [phone] after hours)  For appointments, please call [phone]  WHAT YOU SHOULD KNOW:    Your device is a biventricular ICD, which delivers resynchronization therapy and is also a defibrillator (see below)  Cardiac resynchronization therapy (CRT) is a procedure used to treat problems with how your heart contracts  CRT is also called biventricular pacing  Your heart has 2 upper chambers, called atria, and 2 lower chambers, called ventricles  Your heartbeat is synchronized when all areas of your heart contract together properly  When the areas of your heart do not contract as they should, your heart cannot pump enough blood and oxygen to your body  You may have trouble breathing, tire easily, and have swelling in your legs and feet   With a biventricular device, there is one wire in the right atria (in most cases), one wire in the right ventricle, and a third wire that goes through a vein and wraps around to your left ventricle  The two ventricular wires work together to help your heart contract more synchronously and efficiently  AFTER YOU LEAVE:      Medicines:   Heart medicine may be given to help your heart beat strongly and regularly  Ask your healthcare provider for more information about heart medicines  Take your medicine as directed  Contact your healthcare provider if you think your medicine is not helping or if you have side effects  Tell him if you are allergic to any medicine  Keep a list of the medicines, vitamins, and herbs you take  Include the amounts, and when and why you take them  Bring the list or the pill bottles to follow-up visits  Carry your medicine list with you in case of an emergency  Driving: you are ok to drive 48 hours after device is implanted     Follow up with your healthcare provider as directed: You will need to return to have your pacemaker checked  You may also need an EKG, echocardiogram, or chest x-ray to check the leads in your heart, and your doctor will order these tests if necessary  Write down your questions so you remember to ask them during your visits  Device safety: Some electrical devices may interfere with how your pacemaker works  Some examples are cell phones, security systems, power cables, and electric monitors  Ask your healthcare provider how long you can be near these devices, and which devices to avoid  Tell caregivers you have a pacemaker before you have a procedure or surgery  Wound care: Care for your wound as directed  Keep incision dry for one week  Do not use lotions/powders/creams on incision  Leave outer bandage in place for 1 week - it is water proof, and as long as it is fully adhered to your skin you may shower with it    If it appears as though the bandage is coming off and/or there is any communication to the area of device incision, please then keep the whole area dry for the remaining week  After 1 week, please remove by pulling all edges away from the center of the bandage  No overhead reaching/pushing/pulling/lifting greater than 5-10lbs with left arm for one month  Please call the office if you notice redness, swelling, bleeding, or drainage from incision or if you develop fevers      Contact your healthcare provider if:   You have new or increased swelling in your legs or feet  You feel more tired than usual    You have trouble breathing during activity  Your wound is red, warm, swollen, or draining pus  You have a fever  You have questions or concerns about your condition or care  Seek care immediately or call 911 if: You feel like your heart is fluttering or jumping  You have any of the following signs of a heart attack:    Squeezing, pressure, fullness, or pain in your chest that lasts longer than a few minutes or returns   Discomfort or pain in your back, neck, jaw, stomach, or arm   Shortness of breath or breathing problems   A sudden cold sweat, lightheadedness, dizziness, or nausea, especially with chest pain or trouble breathing      Implantable Cardioverter Defibrillator (ICD)    If you have any questions, please call [phone] to speak with a nurse (8:30am-4pm, or [phone] after hours)  For appointments, please call [phone]  WHAT YOU SHOULD KNOW:    Your device is a biventricular ICD, which delivers resynchronization therapy (see above) and is also a defibrillator  An implantable cardioverter defibrillator (ICD) is a small device that monitors your heart rate and rhythm  It is commonly placed inside your upper chest region  It may be used if you have a ventricular arrhythmia, which is an irregular, dangerous rhythm from the bottom chamber of your heart  Some arrhythmias may cause your heart to suddenly stop beating   An ICD can give a shock to your heart to make it start beating again, or it can give pacing therapy (also known as pain-free therapy) to return your heart to normal rhythm  It is also a pacemaker, so it will pace your heart if needed to prevent it from beating too slowly  AFTER YOU LEAVE:      Follow up with your primary healthcare provider or cardiologist as directed: You will need to follow-up to have your ICD checked and make sure you are not having problems  Write down your questions so you remember to ask them during your visits  Self-care:   Ask about activity: Ask if you need to avoid moving your shoulder or arm, and for how long  Ask if you should avoid lifting heavy objects  Do not play any contact sports, such as football or wrestling, until your primary healthcare provider [facility] or cardiologist tells you it is okay  You may only be able to drive for a certain amount of time per day, or during certain hours  Ask when you can return to work  Care for your skin over the ICD: see answer in instructions above     When you get a shock from your ICD: A shock may feel like someone has hit you, or you may feel a thump in the chest  If someone is touching you when you get a shock, they may feel a tingling feeling  The first time you feel a shock, it may scare you  Sit or lie down and stay calm  Ask someone to stay with you if possible  Please either call your cardiologist or report to an emergency room  Safety instructions when you have an ICD:   Carry an ID card for the ICD: This card has important information about your ICD  Stay away from magnets or machines with electric fields: This includes MRI machines  Avoid leaning into a car engine or doing welding  These things can interfere with how your ICD works  Tell airport security you have an ICD: You may need to be searched by hand when you go through a security gate  The security gate or handheld wand could harm your ICD      Keep an ICD diary: Record when you get a shock and what you were doing before you got the shock  Keep track of how you felt before and after the shock, as well as how many shocks you received  Write down the day and time of each shock  Bring the diary with you when you see your PHP or cardiologist    Roberto Reaves medical alert identification: Wear medical alert jewelry or carry a card that says you have an ICD  Ask your PHP or cardiologist where to get these items  Contact your primary healthcare provider or cardiologist if:   You have a fever  You feel 1 or more shocks from your ICD and feel fine afterwards  Your feet or ankles swell  The area around your ICD is painful or tender after surgery  The skin around your stitches or staples is red, swollen, or draining pus or fluid  You have chills, a cough, and feel weak or achy  You are sad or anxious and find it hard to do your usual activities  You have questions or concerns about your condition or care  Seek care immediately or call 911 if:   Your stitches or staples come apart  Blood soaks through your bandage  You feel more than 3 shocks in a row from your ICD  You become weak, dizzy, or faint  You feel your heart skip beats or beat very fast or slow, but you do not feel a shock from your ICD  You have chest pain that does not go away with rest or medicine  © 2014 [employer] is for End User's use only and may not be sold, redistributed or otherwise used for commercial purposes  All illustrations and images included in CareNotes® are the copyrighted property of [employer]  or [employer]  The above information is an  only  It is not intended as medical advice for individual conditions or treatments  Talk to your doctor, nurse or pharmacist before following any medical regimen to see if it is safe and effective for you

## 2022-11-04 NOTE — ANESTHESIA PROCEDURE NOTES
Arterial Line Insertion  Performed by: Juan Ramon Quinonez MD  Authorized by: Juan Ramon Quinonez MD   Consent: Verbal consent obtained  Risks and benefits: risks, benefits and alternatives were discussed  Consent given by: patient  Patient understanding: patient states understanding of the procedure being performed  Patient consent: the patient's understanding of the procedure matches consent given  Procedure consent: procedure consent matches procedure scheduled  Relevant documents: relevant documents present and verified  Test results: test results available and properly labeled  Site marked: the operative site was marked  Radiology Images: Radiology Images displayed and confirmed  If images not available, report reviewed  Preparation: Patient was prepped and draped in the usual sterile fashion    Indications: hemodynamic monitoring    Procedure Details:  Needle gauge: 20  Number of attempts: 2    Post-procedure:  Post-procedure: dressing applied  Waveform: good waveform  Post-procedure CNS: normal  Patient tolerance: Patient tolerated the procedure well with no immediate complications

## 2022-11-04 NOTE — ANESTHESIA PREPROCEDURE EVALUATION
Procedure:  REMOVAL PACEMAKER/AICD LEADS W LASER/ BIV ICD UPGRADE (N/A Chest)  CARDIAC LEAD EXTRACTION (N/A Chest)    Relevant Problems   CARDIO   (+) HTN (hypertension)   (+) Hyperlipidemia   (+) LBBB (left bundle branch block)      ENDO   (+) Hypothyroidism      /RENAL   (+) Stage 2 chronic kidney disease      NEURO/PSYCH   (+) Personal history of DVT (deep vein thrombosis)   (+) Pulmonary Embolism July 2021      PULMONARY   (+) Moderate COPD (chronic obstructive pulmonary disease) (HCC)   (+) MARRY (obstructive sleep apnea)        Physical Exam    Airway    Mallampati score: II  TM Distance: >3 FB  Neck ROM: full     Dental       Cardiovascular      Pulmonary      Other Findings        Anesthesia Plan  ASA Score- 3     Anesthesia Type- general with ASA Monitors  Additional Monitors: arterial line  Airway Plan: ETT  Comment: MARLENE  Active smoekr          Plan Factors-Exercise tolerance (METS): >4 METS  Chart reviewed  Patient is a current smoker  Patient smoked on day of surgery  Obstructive sleep apnea risk education given perioperatively  Induction- intravenous  Postoperative Plan- Plan for postoperative opioid use  Planned trial extubation    Informed Consent- Anesthetic plan and risks discussed with patient  I personally reviewed this patient with the CRNA  Discussed and agreed on the Anesthesia Plan with the CRNA  Marichuy Gonzalez Risks/benefits and alternatives discussed with Brigette Quiroga including possible PONV, sore throat, damage to teeth/lips/gums, and possibility of rare anesthetic and surgical emergencies including but not limited to heart attack, stroke, and/or death  Patient has no history of dysphagia, diverticula, esophageal dysmotility, strictures, stents, or varices    Additional risks associated with MARLENE probe placement discussed including but not limited to soft tissue injury to oropharynx, dental injury, thermal injury to esophagus, and/or perforation of pharynx or esophagus  Additional risks of central line placement discussed including but not limited to infection, bleeding, inadvertent arterial cannulation, and/or pneumothorax  Preinduction ABP; post-induction TLC and PAC; MARLENE and ICU post-operatively

## 2022-11-04 NOTE — INTERVAL H&P NOTE
H&P reviewed  After examining the patient I find no changes in the patients condition since the H&P had been written      Vitals:    11/04/22 1108   BP: 140/98   Pulse: (!) 109   Resp: 20   Temp: (!) 97 1 °F (36 2 °C)   SpO2: 96%

## 2022-11-04 NOTE — ANESTHESIA POSTPROCEDURE EVALUATION
Post-Op Assessment Note    CV Status:  Stable  Pain Score: 0    Pain management: adequate     Mental Status:  Alert and awake   Hydration Status:  Euvolemic   PONV Controlled:  Controlled   Airway Patency:  Patent      Post Op Vitals Reviewed: Yes      Staff: Anesthesiologist, CRNA         No complications documented      BP   118/83   Temp  97 3   Pulse 90   Resp   16   SpO2   96

## 2022-11-05 ENCOUNTER — APPOINTMENT (OUTPATIENT)
Dept: RADIOLOGY | Facility: HOSPITAL | Age: 52
End: 2022-11-05

## 2022-11-05 VITALS
TEMPERATURE: 98.5 F | RESPIRATION RATE: 18 BRPM | HEIGHT: 72 IN | BODY MASS INDEX: 35.89 KG/M2 | DIASTOLIC BLOOD PRESSURE: 71 MMHG | WEIGHT: 265 LBS | SYSTOLIC BLOOD PRESSURE: 107 MMHG | OXYGEN SATURATION: 89 % | HEART RATE: 88 BPM

## 2022-11-05 LAB
ABO GROUP BLD BPU: NORMAL
ANION GAP SERPL CALCULATED.3IONS-SCNC: 3 MMOL/L (ref 4–13)
BPU ID: NORMAL
BUN SERPL-MCNC: 8 MG/DL (ref 5–25)
CALCIUM SERPL-MCNC: 8.5 MG/DL (ref 8.3–10.1)
CHLORIDE SERPL-SCNC: 111 MMOL/L (ref 96–108)
CO2 SERPL-SCNC: 26 MMOL/L (ref 21–32)
CREAT SERPL-MCNC: 0.79 MG/DL (ref 0.6–1.3)
CROSSMATCH: NORMAL
ERYTHROCYTE [DISTWIDTH] IN BLOOD BY AUTOMATED COUNT: 15.3 % (ref 11.6–15.1)
GFR SERPL CREATININE-BSD FRML MDRD: 101 ML/MIN/1.73SQ M
GLUCOSE SERPL-MCNC: 101 MG/DL (ref 65–140)
HCT VFR BLD AUTO: 42.9 % (ref 36.5–49.3)
HGB BLD-MCNC: 14.1 G/DL (ref 12–17)
MCH RBC QN AUTO: 31.4 PG (ref 26.8–34.3)
MCHC RBC AUTO-ENTMCNC: 32.9 G/DL (ref 31.4–37.4)
MCV RBC AUTO: 96 FL (ref 82–98)
PLATELET # BLD AUTO: 231 THOUSANDS/UL (ref 149–390)
PMV BLD AUTO: 9.6 FL (ref 8.9–12.7)
POTASSIUM SERPL-SCNC: 4.4 MMOL/L (ref 3.5–5.3)
RBC # BLD AUTO: 4.49 MILLION/UL (ref 3.88–5.62)
SODIUM SERPL-SCNC: 140 MMOL/L (ref 135–147)
UNIT DISPENSE STATUS: NORMAL
UNIT PRODUCT CODE: NORMAL
UNIT PRODUCT VOLUME: 350 ML
UNIT RH: NORMAL
WBC # BLD AUTO: 11.21 THOUSAND/UL (ref 4.31–10.16)

## 2022-11-05 RX ADMIN — POTASSIUM CHLORIDE 20 MEQ: 1500 TABLET, EXTENDED RELEASE ORAL at 10:00

## 2022-11-05 RX ADMIN — TORSEMIDE 40 MG: 20 TABLET ORAL at 10:00

## 2022-11-05 RX ADMIN — APIXABAN 2.5 MG: 2.5 TABLET, FILM COATED ORAL at 10:00

## 2022-11-05 RX ADMIN — SACUBITRIL AND VALSARTAN 1 TABLET: 49; 51 TABLET, FILM COATED ORAL at 10:00

## 2022-11-05 RX ADMIN — GABAPENTIN 300 MG: 300 CAPSULE ORAL at 10:00

## 2022-11-05 RX ADMIN — LEVOTHYROXINE SODIUM 50 MCG: 50 TABLET ORAL at 10:00

## 2022-11-05 RX ADMIN — ASPIRIN 81 MG CHEWABLE TABLET 81 MG: 81 TABLET CHEWABLE at 10:00

## 2022-11-05 RX ADMIN — METOPROLOL SUCCINATE 100 MG: 100 TABLET, EXTENDED RELEASE ORAL at 10:00

## 2022-11-05 RX ADMIN — LAMOTRIGINE 100 MG: 100 TABLET ORAL at 10:00

## 2022-11-05 RX ADMIN — CLONAZEPAM 1 MG: 1 TABLET ORAL at 10:00

## 2022-11-05 NOTE — NURSING NOTE
Patient verbalized understanding regarding the AVS and follow up  Patient was offered medications that are scheduled for 6pm but patient declined reporting that he would rather take them at home

## 2022-11-05 NOTE — PLAN OF CARE
Problem: PAIN - ADULT  Goal: Verbalizes/displays adequate comfort level or baseline comfort level  Description: Interventions:  - Encourage patient to monitor pain and request assistance  - Assess pain using appropriate pain scale  - Administer analgesics based on type and severity of pain and evaluate response  - Implement non-pharmacological measures as appropriate and evaluate response  - Consider cultural and social influences on pain and pain management  - Notify physician/advanced practitioner if interventions unsuccessful or patient reports new pain  Outcome: Progressing     Problem: INFECTION - ADULT  Goal: Absence or prevention of progression during hospitalization  Description: INTERVENTIONS:  - Assess and monitor for signs and symptoms of infection  - Monitor lab/diagnostic results  - Monitor all insertion sites, i e  indwelling lines, tubes, and drains  - Monitor endotracheal if appropriate and nasal secretions for changes in amount and color  - Gardner appropriate cooling/warming therapies per order  - Administer medications as ordered  - Instruct and encourage patient and family to use good hand hygiene technique  - Identify and instruct in appropriate isolation precautions for identified infection/condition  Outcome: Progressing  Goal: Absence of fever/infection during neutropenic period  Description: INTERVENTIONS:  - Monitor WBC    Outcome: Progressing     Problem: SAFETY ADULT  Goal: Patient will remain free of falls  Description: INTERVENTIONS:  - Educate patient/family on patient safety including physical limitations  - Instruct patient to call for assistance with activity   - Consult OT/PT to assist with strengthening/mobility   - Keep Call bell within reach  - Keep bed low and locked with side rails adjusted as appropriate  - Keep care items and personal belongings within reach  - Initiate and maintain comfort rounds  - Make Fall Risk Sign visible to staff  - Offer Toileting every 2 Hours, in advance of need  - Apply yellow socks and bracelet for high fall risk patients  - Consider moving patient to room near nurses station  Outcome: Progressing  Goal: Maintain or return to baseline ADL function  Description: INTERVENTIONS:  -  Assess patient's ability to carry out ADLs; assess patient's baseline for ADL function and identify physical deficits which impact ability to perform ADLs (bathing, care of mouth/teeth, toileting, grooming, dressing, etc )  - Assess/evaluate cause of self-care deficits   - Assess range of motion  - Assess patient's mobility; develop plan if impaired  - Assess patient's need for assistive devices and provide as appropriate  - Encourage maximum independence but intervene and supervise when necessary  - Involve family in performance of ADLs  - Assess for home care needs following discharge   - Consider OT consult to assist with ADL evaluation and planning for discharge  - Provide patient education as appropriate  Outcome: Progressing  Goal: Maintains/Returns to pre admission functional level  Description: INTERVENTIONS:  - Perform BMAT or MOVE assessment daily    - Set and communicate daily mobility goal to care team and patient/family/caregiver  - Collaborate with rehabilitation services on mobility goals if consulted  - Perform Range of Motion 2 times a day  - Reposition patient every 2 hours    - Dangle patient 2 times a day  - Stand patient 2 times a day  - Ambulate patient 2 times a day  - Out of bed to chair 2 times a day   - Out of bed for meals 2 times a day  - Out of bed for toileting  - Record patient progress and toleration of activity level   Outcome: Progressing     Problem: DISCHARGE PLANNING  Goal: Discharge to home or other facility with appropriate resources  Description: INTERVENTIONS:  - Identify barriers to discharge w/patient and caregiver  - Arrange for needed discharge resources and transportation as appropriate  - Identify discharge learning needs (meds, wound care, etc )  - Arrange for interpretive services to assist at discharge as needed  - Refer to Case Management Department for coordinating discharge planning if the patient needs post-hospital services based on physician/advanced practitioner order or complex needs related to functional status, cognitive ability, or social support system  Outcome: Progressing     Problem: Knowledge Deficit  Goal: Patient/family/caregiver demonstrates understanding of disease process, treatment plan, medications, and discharge instructions  Description: Complete learning assessment and assess knowledge base    Interventions:  - Provide teaching at level of understanding  - Provide teaching via preferred learning methods  Outcome: Progressing     Problem: CARDIOVASCULAR - ADULT  Goal: Maintains optimal cardiac output and hemodynamic stability  Description: INTERVENTIONS:  - Monitor I/O, vital signs and rhythm  - Monitor for S/S and trends of decreased cardiac output  - Administer and titrate ordered vasoactive medications to optimize hemodynamic stability  - Assess quality of pulses, skin color and temperature  - Assess for signs of decreased coronary artery perfusion  - Instruct patient to report change in severity of symptoms  Outcome: Progressing  Goal: Absence of cardiac dysrhythmias or at baseline rhythm  Description: INTERVENTIONS:  - Continuous cardiac monitoring, vital signs, obtain 12 lead EKG if ordered  - Administer antiarrhythmic and heart rate control medications as ordered  - Monitor electrolytes and administer replacement therapy as ordered  Outcome: Progressing

## 2022-11-05 NOTE — PROGRESS NOTES
Patient had verbalized concern regarding the location of his glasses  Called pre-op are and was informed that glasses were not found  When entering the room the patient was noted to be wearing his glasses    He reports having found them in his bag that had been previously searched by previous nurse

## 2022-11-05 NOTE — DISCHARGE SUMMARY
Discharge Summary - Kathrine Vaughan 45 y o  male MRN: [id number]    Unit/Bed#: [facility] 423-01 Encounter: [id number]      Admission Date: 11/4/2022   Discharge Date: 11/5/2022    Discharge Diagnosis:   Presence of BiV ICD with LV lead malfunction and battery at EOL    Procedures Performed:   Transvenous laser LV lead extraction  BiV ICD/pacemaker generator removal  Orders Placed This Encounter   Procedures   • Cardiac ep lab eps/ablations       Consultants: CT Surgery    HPI: Please refer to the initial history and physical as well as procedure notes for full details  Briefly, Kathrine Vaughan is a 45y o  year old male with PMH significant for presence of BiV ICD with LV lead malfunction and battery at end of life  He was seen by Dr Andria Boyle as an outpatient, and LV lead extraction/reimplant and generator change was recommended  He presented this hospital admission to undergo the procedure  Hospital Course: Kathrine Vauhgan presented at his baseline state of health  After the procedure was explained in detail and consent was obtained, he underwent the above procedure without complications  Please see operative note for full details  He tolerated the procedure well  Chest x-ray immediately following the procedure showed appropriate lead placement without pneumothorax  He was then monitored overnight for further observation  There were no acute issues or events overnight  The following morning he denied all cardiac complaints, including chest pain/heaviness/tightness/pressure, palpitations, shortness of breath, orthopnea, lightheadedness, presyncope, syncope or N/V  His vital signs were reviewed and labs were stable  Telemetry showed  rhythm  Chest x-ray this morning showed appropriate device placement without pneumothorax  Device interrogation showed appropriate device function, including lead sensing, threshold, and impedance   His incision was clean, dry, and intact without swelling, hematoma, redness, bleeding, drainage, or signs of infection  Groin incision c/d/i  Stitched removed without incident  Physical exam on the day of discharge was as follows:  Physical Exam  Vitals reviewed  Constitutional:       General: He is not in acute distress  Appearance: He is not ill-appearing or diaphoretic  HENT:      Head: Normocephalic and atraumatic  Right Ear: External ear normal       Left Ear: External ear normal       Nose: Nose normal    Eyes:      General:         Right eye: No discharge  Left eye: No discharge  Cardiovascular:      Rate and Rhythm: Normal rate and regular rhythm  Heart sounds: No murmur heard  No friction rub  Pulmonary:      Effort: Pulmonary effort is normal       Breath sounds: Normal breath sounds  No wheezing, rhonchi or rales  Abdominal:      General: There is no distension  Palpations: Abdomen is soft  Tenderness: There is no abdominal tenderness  Musculoskeletal:         General: No deformity or signs of injury  Cervical back: No rigidity  No muscular tenderness  Right lower leg: No edema  Left lower leg: No edema  Skin:     General: Skin is warm and dry  Capillary Refill: Capillary refill takes less than 2 seconds  Coloration: Skin is not jaundiced or pale  Neurological:      General: No focal deficit present  Mental Status: He is alert and oriented to person, place, and time  Mental status is at baseline  Psychiatric:         Mood and Affect: Mood normal          Behavior: Behavior normal          Thought Content: Thought content normal          He was given routine post implantation discharge instructions and restrictions, including wound care, and these were explained in detail  He was instructed to leave the Aquacell bandage over top of the incision for the full week - keeping the incision dry during that time  Malgorzata Nicole  He was given a two week follow up appointment with our device clinic for device interrogation and site check  he was instructed to follow up with his primary cardiologist as previously instructed  In terms of medications, no changes have been made  He is on goal directed medical therapy of Jardiance, Toprol XL, Entresto, and Torsemide  He is stable for discharge at this time with all questions answered  He was discussed in detail with Dr Jermaine Quintana, who is in agreement with this discharge summary  Discharge Medications:  See after visit summary for reconciled discharge medications provided to patient and family  Medications Prior to Admission   Medication   • aspirin 81 mg chewable tablet   • clonazePAM (KlonoPIN) 1 mg tablet   • Empagliflozin (Jardiance) 10 MG TABS   • gabapentin (NEURONTIN) 300 mg capsule   • lamoTRIgine (LaMICtal) 100 mg tablet   • levothyroxine 50 mcg tablet   • metoprolol succinate (TOPROL-XL) 100 mg 24 hr tablet   • potassium chloride (Klor-Con M20) 20 mEq tablet   • sacubitril-valsartan (Entresto) 49-51 MG TABS   • tiotropium (Spiriva Respimat) 2 5 MCG/ACT AERS inhaler   • Torsemide 40 MG TABS   • albuterol (PROVENTIL HFA,VENTOLIN HFA) 90 mcg/act inhaler   • apixaban (Eliquis) 2 5 mg   • nitroglycerin (NITROSTAT) 0 4 mg SL tablet   • QUEtiapine (SEROquel) 25 mg tablet         Pertininet Labs/diagnostics:  CBC with diff:   Results from last 7 days   Lab Units 11/05/22  0542   WBC Thousand/uL 11 21*   HEMOGLOBIN g/dL 14 1   HEMATOCRIT % 42 9   MCV fL 96   PLATELETS Thousands/uL 231   MCH pg 31 4   MCHC g/dL 32 9   RDW % 15 3*   MPV fL 9 6       BMP:   Results from last 7 days   Lab Units 11/05/22  0542   POTASSIUM mmol/L 4 4   CHLORIDE mmol/L 111*   CO2 mmol/L 26   BUN mg/dL 8   CREATININE mg/dL 0 79   CALCIUM mg/dL 8 5       Magnesium:       Coags:         Complications: none    Condition at Discharge: good     Discharge instructions/Information to patient and family:   See after visit summary for information provided to patient and family        Provisions for Follow-Up Care:  See after visit summary for information related to follow-up care and any pertinent home health orders  Disposition: Home    Planned Readmission: No    Discharge Statement   I spent 45 minutes minutes discharging the patient  This time was spent on the day of discharge  I had direct contact with the patient on the day of discharge  Additional documentation is required if more than 30 minutes were spent on discharge   Evaluating the incision, discussing discharge instructions and restrictions, arranging follow up appointments, discussing medications

## 2022-11-07 LAB
ATRIAL RATE: 101 BPM
P AXIS: 121 DEGREES
PR INTERVAL: 142 MS
QRS AXIS: 230 DEGREES
QRSD INTERVAL: 158 MS
QT INTERVAL: 425 MS
QTC INTERVAL: 551 MS
T WAVE AXIS: 73 DEGREES
VENTRICULAR RATE: 101 BPM

## 2022-11-09 DIAGNOSIS — F41.8 DEPRESSION WITH ANXIETY: ICD-10-CM

## 2022-11-09 RX ORDER — CLONAZEPAM 1 MG/1
1 TABLET ORAL 2 TIMES DAILY
Qty: 60 TABLET | Refills: 0 | Status: SHIPPED | OUTPATIENT
Start: 2022-11-09

## 2022-11-18 ENCOUNTER — IN-CLINIC DEVICE VISIT (OUTPATIENT)
Dept: CARDIOLOGY CLINIC | Facility: CLINIC | Age: 52
End: 2022-11-18

## 2022-11-18 DIAGNOSIS — Z95.810 AICD (AUTOMATIC CARDIOVERTER/DEFIBRILLATOR) PRESENT: Primary | ICD-10-CM

## 2022-11-18 NOTE — PROGRESS NOTES
Results for orders placed or performed in visit on 11/18/22   Cardiac EP device report    Narrative    [employer] BIV ICD/ACTIVE SYSTEM IS MRI CONDITIONAL  DEVICE INTERROGATED IN THE [facility] [place] OFFICE  BATTERY VOLTAGE ADEQUATE (8 3 YRS)  AP-0 5%, BVP-97% (TOTAL -96 7%+VSR PACE-0 6%)  ALL LEAD PARAMETERS WITHIN NORMAL LIMITS  NO SIGNIFICANT HIGH RATE EPISODES  [address] [facility]  OPTI-VOL INITIALIZING  NORMAL DEVICE FUNCTION  WOUND CHECK: INCISION CLEAN AND DRY WITH EDGES APPROXIMATED; STAPLES REMOVED; WOUND CARE AND RESTRICTIONS REVIEWED WITH PATIENT   GV

## 2022-11-22 ENCOUNTER — OFFICE VISIT (OUTPATIENT)
Dept: INTERNAL MEDICINE CLINIC | Facility: CLINIC | Age: 52
End: 2022-11-22

## 2022-11-22 VITALS
OXYGEN SATURATION: 96 % | SYSTOLIC BLOOD PRESSURE: 124 MMHG | HEART RATE: 92 BPM | HEIGHT: 72 IN | WEIGHT: 262 LBS | BODY MASS INDEX: 35.49 KG/M2 | TEMPERATURE: 97.6 F | DIASTOLIC BLOOD PRESSURE: 84 MMHG

## 2022-11-22 DIAGNOSIS — Z45.010 PACEMAKER BATTERY DEPLETION: ICD-10-CM

## 2022-11-22 DIAGNOSIS — F41.9 ANXIETY AND DEPRESSION: ICD-10-CM

## 2022-11-22 DIAGNOSIS — R73.03 PRE-DIABETES: ICD-10-CM

## 2022-11-22 DIAGNOSIS — I50.22 CHRONIC SYSTOLIC CHF (CONGESTIVE HEART FAILURE), NYHA CLASS 3 (HCC): ICD-10-CM

## 2022-11-22 DIAGNOSIS — G47.33 OSA (OBSTRUCTIVE SLEEP APNEA): ICD-10-CM

## 2022-11-22 DIAGNOSIS — Z86.711 HISTORY OF PULMONARY EMBOLISM: ICD-10-CM

## 2022-11-22 DIAGNOSIS — E03.9 HYPOTHYROIDISM, UNSPECIFIED TYPE: Primary | ICD-10-CM

## 2022-11-22 DIAGNOSIS — J44.9 MODERATE COPD (CHRONIC OBSTRUCTIVE PULMONARY DISEASE) (HCC): ICD-10-CM

## 2022-11-22 DIAGNOSIS — G62.9 NEUROPATHY: ICD-10-CM

## 2022-11-22 DIAGNOSIS — I73.9 PERIPHERAL ARTERIAL DISEASE (HCC): ICD-10-CM

## 2022-11-22 DIAGNOSIS — F32.A ANXIETY AND DEPRESSION: ICD-10-CM

## 2022-11-22 DIAGNOSIS — E78.5 HYPERLIPIDEMIA, UNSPECIFIED HYPERLIPIDEMIA TYPE: ICD-10-CM

## 2022-11-22 DIAGNOSIS — I10 PRIMARY HYPERTENSION: ICD-10-CM

## 2022-11-22 NOTE — PROGRESS NOTES
Name: Tito Villa      : 1970      MRN: [id number]  Encounter Provider: SHERRY Bryan  Encounter Date: 2022   Encounter department: [facility] Mikhail Narvaez     1  Hypothyroidism, unspecified type  Assessment & Plan:  Adequately replaced    Orders:  -     TSH, 3rd generation with Free T4 reflex; Future    2  Chronic systolic CHF (congestive heart failure), NYHA class 3 (HCC)  Assessment & Plan:  Wt Readings from Last 3 Encounters:   22 119 kg (262 lb)   22 120 kg (265 lb)   10/20/22 107 kg (236 lb)       Weight has been stable  On entresto and torsemide  Sees cardiology  3  Moderate COPD (chronic obstructive pulmonary disease) ([facility])  Assessment & Plan:  Stable  On spiriva  Albuterol PRN      4  MARRY (obstructive sleep apnea)  Assessment & Plan:  Not tolerant of CPAP      5  Primary hypertension  Assessment & Plan:  Stable  On metoprolol       6  Peripheral arterial disease ([address] )  Assessment & Plan:  Sees vascular      7  Neuropathy  Assessment & Plan:  On gabapentin       8  Hyperlipidemia, unspecified hyperlipidemia type  Assessment & Plan:  Not on statin  Orders:  -     Lipid Panel with Direct LDL reflex; Future; Expected date: 2023    9  Pre-diabetes  Assessment & Plan: On Jardiance  Last A1C 5 8%    Orders:  -     Hemoglobin A1C; Future; Expected date: 2023  -     Comprehensive metabolic panel; Future; Expected date: 2023    10  Pulmonary Embolism 2021  Assessment & Plan: On eliquis 2 5 mg twice daily for prophylaxis  Orders:  -     CBC and differential; Future; Expected date: 2023    11  Anxiety and depression  Assessment & Plan:  Off seroquel  Doing ok weaning off lamictal  Should be able to stop in 2 weeks  On clonazepam twice daily  Declines seeing psychiatry  12  Pacemaker battery depletion  Assessment & Plan:  S/p new pacemaker placement              Subjective      Dirk is here today for follow up  He is doing ok  He had recent pacemaker replacement  He did well  Breathing has been ok, good and bad days  Cut back to 8 cigarettes a day from over a pack  He has been staying away from processed foods  Eats 1-2 meals a day  He stopped seroquel  He has been weaning off lamictal  He is down to taking it every other day  He still takes clonazepam as needed  Chronic swelling both legs due to blood clots  His weight has been stable  Review of Systems   Constitutional: Negative for activity change, appetite change, fatigue and unexpected weight change  Eyes: Negative for visual disturbance  Respiratory: Negative for cough and shortness of breath  Cardiovascular: Positive for leg swelling  Negative for chest pain and palpitations  Gastrointestinal: Negative for abdominal pain, blood in stool, constipation and diarrhea  Genitourinary: Negative for difficulty urinating  Musculoskeletal: Negative for arthralgias  Skin: Negative for rash  Neurological: Negative for dizziness, weakness, light-headedness and headaches  Psychiatric/Behavioral: Negative for behavioral problems, dysphoric mood and sleep disturbance  The patient is not nervous/anxious          Current Outpatient Medications on File Prior to Visit   Medication Sig   • albuterol (PROVENTIL HFA,VENTOLIN HFA) 90 mcg/act inhaler Inhale 2 puffs every 6 (six) hours as needed for wheezing or shortness of breath   • apixaban (Eliquis) 2 5 mg Take 1 tablet (2 5 mg total) by mouth 2 (two) times a day   • aspirin 81 mg chewable tablet Chew 1 tablet (81 mg total) daily   • clonazePAM (KlonoPIN) 1 mg tablet Take 1 tablet (1 mg total) by mouth 2 (two) times a day   • Empagliflozin (Jardiance) 10 MG TABS Take 1 tablet (10 mg total) by mouth every morning   • gabapentin (NEURONTIN) 300 mg capsule take 1 capsule by mouth twice a day   • lamoTRIgine (LaMICtal) 100 mg tablet take 1 tablet by mouth twice a day   • levothyroxine 50 mcg tablet take 1 tablet by mouth once daily   • metoprolol succinate (TOPROL-XL) 100 mg 24 hr tablet Take 1 tablet (100 mg total) by mouth 2 (two) times a day   • nitroglycerin (NITROSTAT) 0 4 mg SL tablet Place 1 tablet (0 4 mg total) under the tongue every 5 (five) minutes as needed for chest pain   • potassium chloride (Klor-Con M20) 20 mEq tablet Take 1 tablet (20 mEq total) by mouth daily   • QUEtiapine (SEROquel) 25 mg tablet Take 1 tablet (25 mg total) by mouth daily at bedtime (Patient not taking: No sig reported)   • sacubitril-valsartan (Entresto) 49-51 MG TABS Take 1 tablet by mouth 2 (two) times a day   • tiotropium (Spiriva Respimat) 2 5 MCG/ACT AERS inhaler Inhale 2 puffs daily   • Torsemide 40 MG TABS Take 40 mg by mouth 2 (two) times a day       Objective     /84   Pulse 92   Temp 97 6 °F (36 4 °C)   Ht 6' (1 829 m)   Wt 119 kg (262 lb)   SpO2 96%   BMI 35 53 kg/m²     Physical Exam  Vitals reviewed  Constitutional:       Appearance: Normal appearance  HENT:      Head: Normocephalic and atraumatic  Eyes:      Conjunctiva/sclera: Conjunctivae normal    Cardiovascular:      Rate and Rhythm: Normal rate and regular rhythm  Heart sounds: Normal heart sounds  Pulmonary:      Breath sounds: Normal breath sounds  Abdominal:      General: Bowel sounds are normal       Palpations: Abdomen is soft  Musculoskeletal:         General: Normal range of motion  Right lower leg: Edema present  Left lower leg: Edema present  Comments: Moderate pitting edema bilateral lower extremities    Skin:     General: Skin is warm and dry  Neurological:      Mental Status: He is alert and oriented to person, place, and time     Psychiatric:         Mood and Affect: Mood normal          Behavior: Behavior normal        Shayan SHERRY Sherman

## 2022-11-22 NOTE — ASSESSMENT & PLAN NOTE
Wt Readings from Last 3 Encounters:   11/22/22 119 kg (262 lb)   11/04/22 120 kg (265 lb)   10/20/22 107 kg (236 lb)       Weight has been stable  On entresto and torsemide  Sees cardiology

## 2022-11-22 NOTE — ASSESSMENT & PLAN NOTE
Off seroquel  Doing ok weaning off lamictal  Should be able to stop in 2 weeks  On clonazepam twice daily  Declines seeing psychiatry

## 2022-12-02 DIAGNOSIS — F41.8 DEPRESSION WITH ANXIETY: ICD-10-CM

## 2022-12-08 RX ORDER — CLONAZEPAM 1 MG/1
1 TABLET ORAL 2 TIMES DAILY
Qty: 60 TABLET | Refills: 0 | Status: SHIPPED | OUTPATIENT
Start: 2022-12-08

## 2022-12-13 ENCOUNTER — OFFICE VISIT (OUTPATIENT)
Dept: PODIATRY | Facility: CLINIC | Age: 52
End: 2022-12-13

## 2022-12-13 VITALS
SYSTOLIC BLOOD PRESSURE: 125 MMHG | HEART RATE: 88 BPM | DIASTOLIC BLOOD PRESSURE: 85 MMHG | HEIGHT: 72 IN | WEIGHT: 262 LBS | BODY MASS INDEX: 35.49 KG/M2

## 2022-12-13 DIAGNOSIS — I73.9 PERIPHERAL ARTERIAL DISEASE (HCC): Primary | ICD-10-CM

## 2022-12-13 DIAGNOSIS — B35.1 ONYCHOMYCOSIS: ICD-10-CM

## 2022-12-13 DIAGNOSIS — G62.9 NEUROPATHY: ICD-10-CM

## 2022-12-13 NOTE — PROGRESS NOTES
Eugene Foster  1970  AT RISK FOOT CARE    1  Peripheral arterial disease ([facility] [address] )        2  Onychomycosis        3  Neuropathy            Patient presents for at-risk foot care  Patient has no acute concerns today  Patient has significant lower extremity risk due to diminished pulses in the feet and trophic skin changes to the lower extremity including thick toenail, atrophic skin, and decreased hair growth  On exam patient has thickened, hypertrophic, discolored, brittle toenails with subungual debris and tenderness x10   Callus: none  Patient has diminished pedal pulses and decreased perfusion to the lower extremities  Patient has significant trophic changes to the skin including thick toenails, decreased pedal hair and atrophic skin  Today's treatment includes:  Debridement of toenails  Using nail nipper, wisam, and curette, nails were sharply debrided, reduced in thickness and length  Devitalized nail tissue and fungal debris excised and removed  Patient tolerated well  Discussed proper shoe gear, daily inspections of feet, and general foot health with patient  Patient has Q8  findings and is recommended for at risk foot care every 9-10 weeks      Patients most recent complete clinical foot exam was on: 2/9/2022

## 2022-12-15 NOTE — PROGRESS NOTES
Heart Failure Outpatient Progress Note - Job Katz 45 y o  male MRN: [id number]    @ Encounter: [id number]      Assessment/Plan:    Patient Active Problem List    Diagnosis Date Noted   • Chronic systolic CHF (congestive heart failure), NYHA class 3 ([place] [address] ) 06/30/2017   • Stage 2 chronic kidney disease 11/20/2021   • Hyperlipidemia 11/20/2021   • Elevated hemoglobin ([place] [address] ) 11/20/2021   • Personal history of DVT (deep vein thrombosis) 11/20/2021   • Pulmonary Embolism July 2021 11/20/2021   • Neuropathy 08/15/2021   • Severe obesity (BMI 35 0-39  9) with comorbidity ([place] [address] ) 08/02/2021   • Pre-diabetes 08/02/2021   • Hypothyroidism 07/26/2021   • Moderate COPD (chronic obstructive pulmonary disease) ([facility])    • Anxiety and depression    • MARRY (obstructive sleep apnea)    • Atherosclerosis of native arteries of right leg with ulceration of other part of foot ([place] [address] ) 03/22/2018   • Peripheral arterial disease ([place] [address] ) 02/20/2018   • Cardiomyopathy, dilated ([place] [address] ) 08/10/2017   • LBBB (left bundle branch block) 08/09/2017   • HTN (hypertension) 06/28/2017   • Alcohol abuse 06/28/2017   • Tobacco use 06/28/2017   • Dilated cardiomyopathy ([place] [address] )    • Pacemaker battery depletion    • Pacemaker lead failure    • NSVT (nonsustained ventricular tachycardia) 03/08/2022       1   Chronic HFrEF, Stage C, NYHA 2  Etiology: DNICM, possible ETOH- still drinking 4-5 beers a day    Weight: 261 lbs  NT proBNP: 10/1/20: 2075  7/6/20: 734    Studies- personally reviewed by me  Echo 7/14/22:  LVEF: 45%  LVEDd: 5 1 cm  RV: normal  Mild MR    Echo 7/16/21: done at time of acute PE  LVEF: 35%  LVEDd: 6 3 cm  RV: normal size, mildly reduced- no sign RV strain    Echo 11/23/20:  LVEF: 30%  LVEDd: 7 2 cm  Mild MR    Echo 10/10/19:  LVEF: 25%, mild LVH  LVEDd: 7 4 cm  RV: normal  MV: moderate MR     Echo 1/8/18: EF: 25%,LVEDd: 6 3 cm  Echo 9/27/17: EF: 10%, LVEDd: 6 75 cm     LHC 6/29/17 negative for obstructive CAD, LVEDP 30 mmhg    Neurohormonal Blockade:  --Beta-Blocker: Toprol  mg BID  --ACEi, ARB or ARNi: entresto 49/51 mg BID  --Aldosterone Receptor Blocker:  --SGLT2i: Jardiance 10 mg daily  --Diuretic: Lasix 80 mg BID k 20 meq daily     Sudden Cardiac Death Risk Reduction:  --ICD: 10/4/17- Jan 4 interrogation: 4 sec of VT/VR at 290 bpm- terminated on own  Interrogation 8/4/21:  96%, 1 NSVT, no therapies, optivol normal     Electrical Resynchronization:  Native  msec  --BiV placed in 10/4/17- laser lead extraction and new lead and new MDT BiV ICD on 11/4/22  Positive response: EF: to 25-30% and LVEDd down to 6 3 from 6 8 with BiV pacer  Interrogation 11/18/22: BVP 97%, no high rate episodes    Advanced therapies  Drinking and  Smoking     # s/p Acute saddle pulmonary embolism and LLE DVT  AC: Eliquis   CTA Chest 7/16/21: Extensive pulmonary emboli including saddle emboli and clot extending throughout the lungs  LE duplex 7/16/21: acute non occlusive DVT LLE  Echo 7/16/21: no sign RV strain  #  Etoh abuse history- about 4-5 beer a day  #  tobacco use- continued, did not tolerate Chantix- mood changed  # COPD  #  MARRY- cpap  # PVD- LEADs: R CFA 50-75% stenosis, LLE with no sign diz     Healed ulcers on right toes  Likely vasospasm from Buerger's dz  LEADS 3/7/22: Right 50-75% stenosis in common femoral artery  High grade stenosis occlusion in right dorsalis pedis artery  Left: no occlusive dz  # lipids   8/16/22: , HDL 39  11/15/21: Tri 186, HDL 50,   7/6/20 , HDL 48  # depression-  Klonopin     TODAY'S PLAN:  Continue entresto and Jardiance and rest of GDMT, Cr 1 06 on 8/16/22- increase Entresto to 97/103 mg twice daily  Edema in legs is bad  Needs to bring in less fluid  Bring back with HF AP in one month  Continue eliquis- has been on 8 months- given on going smoking may be best to stay on  Discussed negative contribution of ETOH to heart failure (volume and myocardial toxicity)  Still smoking- 1 ppd  Intolerant to CPAP for MARRY  - Daily weights     HPI:   Dioni Yu is a 45y o  year old male with a history of a cardiomyopathy diagnosed at [facility] in March 2017 presents from his PCP's office with chest pain  He presented to [facility] in March with progressive shortness of breath and a cough  He was found to have an elevated BNP and echo revealed an dialated cardiomyopathy with EF: 10% LVEDD: 6 6cm; with mild MR and mild TR  He was diuresed with IV lasix and started on metoprolol succinate  There was reportedly no ischemic workup done during that admission  He was fitted for a lifevest at the time of discharge  He was occasionally compliant with his lifevest but reportedly was compliant with his medications  He had a follow up echo after 3 months however he is unsure of the results but was being considered for a pacemaker  He was referred to EP at [facility] however did not continue under their care  He has been diagnosed with MARRY and is on CPAP  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  smokes 1ppd now but as much as 3ppd for the past 40 years  drinks extensively up to 1 case of beer plus hard liquor in a day  He has cut back since March  The last time he drank heavily was about a month ago  He was in the hospital in June with chest pain and cardiac cath was negative for obstructive CAD but LVEDP was elevated at 30 mmHg  He was discharged on LIfeVest  Does not want to wear  Since discharge he started feeling really bad again in the last 3 weeks again with a band sensation of chest pain  His appetite is poor and lost 37 lbs  Zero energy  Down to less than 10 cigarettes a day  Currently lives by himself  Grown adult children  Has a girlfriend that lives around 45 minutes ago  Â 10/27: RHC in Aug CI was 1 9 and PA sat 67% and wedge 24  He underwent BiV ICD placed Oct 4 ok   Drinking about 8 beers a week             Changed to [facility], was down to 3-4 beers a day    He was off his meds for many months - his decision  Started feeling very bad, then just recently went back on his meds  Lisinopril instead of Entresto  He was also drinking heavily and 3 packs a day smoking           S/p admit for acute on chronic HF, 15 lb weight gain in 2 weeks, baseline weight 265 lbs up to 280 lbs, NT proBNP 2075  Non compliant with lasix as output  Diuresed and DC at 270 lbs  Last follow up weight was up to 281 lbs     Follow up echo in Nov 2020, EF: 30%  Admitted July 7/16 to 7/17 with with saddle pulmonary embolism and non occlusive DVT LLE  Discharged on Eliquis  See testing above  Interval History:  Changed furosemide to torsemide 40 mg BID to see if he responds better  Saw Dr Mariely Leger and given high threshold in LV lead, recommended laser extraction and replacement - done on 11/4/22  Interrogation 11/18/22: BVP 97%, no high rate episodes    Back up to a full pack of cigarettes a day    Very edematous in legs  Review of Systems   Constitutional: Negative for activity change, appetite change, fatigue and unexpected weight change  HENT: Negative for congestion and nosebleeds  Eyes: Negative  Respiratory: Negative for cough, chest tightness and shortness of breath  Cardiovascular: Positive for leg swelling  Negative for chest pain and palpitations  Gastrointestinal: Negative for abdominal distention  Endocrine: Negative  Genitourinary: Negative  Musculoskeletal: Negative  Skin: Negative  Neurological: Negative for dizziness, syncope and weakness  Hematological: Negative  Psychiatric/Behavioral: Negative          Past Medical History:   Diagnosis Date   • Allergic 2018    Severe reaction to chantex   • Anxiety    • Anxiety and depression    • CHF (congestive heart failure) ([facility])    • COPD (chronic obstructive pulmonary disease) ([facility])    • Coronary artery disease    • Depression    • Diabetes mellitus ([facility] )    • Dilated cardiomyopathy ([place] [address] )    • Eating disorder • Hypertension    • Obesity    • MARRY (obstructive sleep apnea)          Allergies   Allergen Reactions   • Chantix [Varenicline] Other (See Comments)     Depression           Current Outpatient Medications:   •  albuterol (PROVENTIL HFA,VENTOLIN HFA) 90 mcg/act inhaler, Inhale 2 puffs every 6 (six) hours as needed for wheezing or shortness of breath, Disp: 1 Inhaler, Rfl: 5  •  apixaban (Eliquis) 2 5 mg, Take 1 tablet (2 5 mg total) by mouth 2 (two) times a day, Disp: 180 tablet, Rfl: 0  •  aspirin 81 mg chewable tablet, Chew 1 tablet (81 mg total) daily, Disp: 90 tablet, Rfl: 3  •  clonazePAM (KlonoPIN) 1 mg tablet, Take 1 tablet (1 mg total) by mouth 2 (two) times a day, Disp: 60 tablet, Rfl: 0  •  Empagliflozin (Jardiance) 10 MG TABS, Take 1 tablet (10 mg total) by mouth every morning, Disp: 90 tablet, Rfl: 0  •  gabapentin (NEURONTIN) 300 mg capsule, take 1 capsule by mouth twice a day, Disp: 180 capsule, Rfl: 1  •  lamoTRIgine (LaMICtal) 100 mg tablet, take 1 tablet by mouth twice a day, Disp: 60 tablet, Rfl: 2  •  levothyroxine 50 mcg tablet, take 1 tablet by mouth once daily, Disp: 90 tablet, Rfl: 0  •  metoprolol succinate (TOPROL-XL) 100 mg 24 hr tablet, Take 1 tablet (100 mg total) by mouth 2 (two) times a day, Disp: 180 tablet, Rfl: 0  •  nitroglycerin (NITROSTAT) 0 4 mg SL tablet, Place 1 tablet (0 4 mg total) under the tongue every 5 (five) minutes as needed for chest pain, Disp: 10 tablet, Rfl: 0  •  potassium chloride (Klor-Con M20) 20 mEq tablet, Take 1 tablet (20 mEq total) by mouth daily, Disp: 90 tablet, Rfl: 3  •  QUEtiapine (SEROquel) 25 mg tablet, Take 1 tablet (25 mg total) by mouth daily at bedtime, Disp: 30 tablet, Rfl: 0  •  sacubitril-valsartan (Entresto) 49-51 MG TABS, Take 1 tablet by mouth 2 (two) times a day, Disp: 180 tablet, Rfl: 3  •  tiotropium (Spiriva Respimat) 2 5 MCG/ACT AERS inhaler, Inhale 2 puffs daily, Disp: 4 g, Rfl: 6  •  Torsemide 40 MG TABS, Take 40 mg by mouth 2 (two) times a day, Disp: 180 tablet, Rfl: 3    Social History     Socioeconomic History   • Marital status: Single     Spouse name: Not on file   • Number of children: Not on file   • Years of education: Not on file   • Highest education level: Not on file   Occupational History   • Not on file   Tobacco Use   • Smoking status: Every Day     Packs/day: 1 50     Years: 45 00     Pack years: 67 50     Types: Cigarettes     Start date: 1/1/1976   • Smokeless tobacco: Former     Types: Snuff     Quit date: 11/2/1985   • Tobacco comments:     20 cigs daily as of  07/21/2022   Vaping Use   • Vaping Use: Never used   Substance and Sexual Activity   • Alcohol use: Yes     Alcohol/week: 10 0 standard drinks     Types: 10 Cans of beer per week     Comment: Socially, several times per week   • Drug use: No   • Sexual activity: Not Currently     Partners: Female     Birth control/protection: Abstinence, None   Other Topics Concern   • Not on file   Social History Narrative    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, underground utilities, some asbestos     Drinks a pot of coffee a day      Social Determinants of Health     Financial Resource Strain: Not on file   Food Insecurity: Not on file   Transportation Needs: Not on file   Physical Activity: Not on file   Stress: Not on file   Social Connections: Not on file   Intimate Partner Violence: Not on file   Housing Stability: Not on file       Family History   Problem Relation Age of Onset   • Diabetes Mother    • Emphysema Mother    • Dementia Mother    • Alcohol abuse Father    • Emphysema Maternal Aunt    • Asthma Maternal Aunt    • COPD Maternal Aunt    • Depression Sister    • Drug abuse Sister    • Colon cancer Maternal Uncle    • Stroke Neg Hx        Physical Exam:    Vitals: There were no vitals filed for this visit  Physical Exam  Constitutional:       Appearance: He is well-developed  HENT:      Head: Normocephalic and atraumatic     Eyes:      Pupils: Pupils are equal, round, and reactive to light  Neck:      Vascular: No JVD  Cardiovascular:      Rate and Rhythm: Normal rate and regular rhythm  Heart sounds: No murmur heard  Pulmonary:      Effort: Pulmonary effort is normal  No respiratory distress  Breath sounds: Normal breath sounds  Abdominal:      General: There is no distension  Palpations: Abdomen is soft  Tenderness: There is no abdominal tenderness  Musculoskeletal:         General: Normal range of motion  Cervical back: Normal range of motion  Right lower leg: Edema present  Left lower leg: Edema present  Skin:     General: Skin is warm and dry  Findings: No rash  Neurological:      Mental Status: He is alert and oriented to person, place, and time  Labs & Resuts:    Lab Results   Component Value Date    SODIUM 140 11/05/2022    K 4 4 11/05/2022     (H) 11/05/2022    CO2 26 11/05/2022    BUN 8 11/05/2022    CREATININE 0 79 11/05/2022    GLUC 101 11/05/2022    CALCIUM 8 5 11/05/2022     Lab Results   Component Value Date    WBC 11 21 (H) 11/05/2022    HGB 14 1 11/05/2022    HCT 42 9 11/05/2022    MCV 96 11/05/2022     11/05/2022     Lab Results   Component Value Date    NTBNP 3,552 (H) 03/24/2021      Lab Results   Component Value Date    CHOLESTEROL 165 08/16/2022    CHOLESTEROL 190 11/15/2021    CHOLESTEROL 216 (H) 07/06/2020     Lab Results   Component Value Date    HDL 39 (L) 08/16/2022    HDL 50 11/15/2021    HDL 48 07/06/2020     Lab Results   Component Value Date    TRIG 112 08/16/2022    TRIG 186 (H) 11/15/2021    TRIG 214 (H) 07/06/2020     Lab Results   Component Value Date    NONHDLC 168 07/06/2020    [place] 155 10/23/2019       EKG personally reviewed by Bailey Webster  Counseling / Coordination of Care  Time spent today 25 minutes  Greater than 50% of total time was spent with the patient and / or family counseling and / or coordination of care   We went over current diagnosis, most recent studies and any changes in treatment      [facility] PULMONARY HYPERTENSION  MEDICAL DIRECTOR OF [facility] Nuha Robb

## 2022-12-19 ENCOUNTER — OFFICE VISIT (OUTPATIENT)
Dept: CARDIOLOGY CLINIC | Facility: CLINIC | Age: 52
End: 2022-12-19

## 2022-12-19 VITALS
BODY MASS INDEX: 35.35 KG/M2 | DIASTOLIC BLOOD PRESSURE: 92 MMHG | HEIGHT: 72 IN | SYSTOLIC BLOOD PRESSURE: 122 MMHG | OXYGEN SATURATION: 97 % | HEART RATE: 110 BPM | WEIGHT: 261 LBS

## 2022-12-19 DIAGNOSIS — I50.22 CHRONIC SYSTOLIC CHF (CONGESTIVE HEART FAILURE), NYHA CLASS 3 (HCC): ICD-10-CM

## 2022-12-19 DIAGNOSIS — E78.00 PURE HYPERCHOLESTEROLEMIA: ICD-10-CM

## 2022-12-19 DIAGNOSIS — I44.7 LBBB (LEFT BUNDLE BRANCH BLOCK): ICD-10-CM

## 2022-12-19 DIAGNOSIS — I42.0 DILATED CARDIOMYOPATHY (HCC): ICD-10-CM

## 2022-12-19 DIAGNOSIS — I42.0 CARDIOMYOPATHY, DILATED (HCC): Primary | Chronic | ICD-10-CM

## 2022-12-19 RX ORDER — SACUBITRIL AND VALSARTAN 97; 103 MG/1; MG/1
1 TABLET, FILM COATED ORAL 2 TIMES DAILY
Qty: 60 TABLET | Refills: 4 | Status: SHIPPED | OUTPATIENT
Start: 2022-12-19

## 2022-12-19 NOTE — PATIENT INSTRUCTIONS
Increase Entresto to 97/103 mg twice daily- double your current dose    Please check daily weights and contact the heart failure program at  if you gain 3 lb in one day or 5 lb in one week  Please limit daily sodium intake to 2000 mg daily  Please limit daily fluid intake to 2000 ml daily  Bring complete list of medications to your follow up appointment

## 2023-03-14 ENCOUNTER — RA CDI HCC (OUTPATIENT)
Dept: OTHER | Facility: HOSPITAL | Age: 53
End: 2023-03-14

## 2023-03-14 NOTE — PROGRESS NOTES
I11 0  [facility]  coding opportunities          Chart Reviewed number of suggestions sent to Provider: 1     Patients Insurance     Medicare Insurance: [employer]

## 2023-09-13 NOTE — ASSESSMENT & PLAN NOTE
Not tolerant of CPAP What Type Of Note Output Would You Prefer (Optional)?: Bullet Format Hpi Title: Evaluation of Skin Lesions

## 2024-03-12 NOTE — ASSESSMENT & PLAN NOTE
My cubital tunnel incision is only 2-3 cm, the handout is a general idea of the surgery. Where is her tattoo photo? I can draw on it for her. Wt Readings from Last 3 Encounters:   04/12/22 123 kg (272 lb)   03/08/22 124 kg (274 lb 3 2 oz)   02/09/22 125 kg (275 lb)       Stable  Encouraged daily weights at home  On lasix and entresto  Has ICD  Sees cardiology

## (undated) DEVICE — LIGHT HANDLE COVER SLEEVE DISP BLUE STELLAR

## (undated) DEVICE — GUIDE SHEATH 9FR ATTAIN COMMAND 9FR EH CURVE

## (undated) DEVICE — STRL BETHLACCESS CATHETER PACK: Brand: CARDINAL HEALTH

## (undated) DEVICE — GLOVE SRG BIOGEL ORTHOPEDIC 7.5

## (undated) DEVICE — SPONGE LAP 18 X 18 IN

## (undated) DEVICE — TUBING SUCTION 5MM X 12 FT

## (undated) DEVICE — SYRINGE 10ML SLIP TIP LF

## (undated) DEVICE — GUIDE SHEATH 7FR 90 D ATTAIN SELECT II SUREVALVE

## (undated) DEVICE — GLOVE INDICATOR PI UNDERGLOVE SZ 8 BLUE

## (undated) DEVICE — DRAPE SURGIKIT SADDLE BAG

## (undated) DEVICE — RUNTHROUGH NS EXTRA FLOPPY PTCA GUIDEWIRE: Brand: RUNTHROUGH

## (undated) DEVICE — LASER BRIDGE ACCESSORY KIT

## (undated) DEVICE — DRAPE C-ARM BAG/DOME XR ELSTIC OPEN LF

## (undated) DEVICE — SUT MONOCRYL 4-0 PS-2 18 IN Y496G

## (undated) DEVICE — GAUZE SPONGES,USP TYPE VII GAUZE, 12 PLY: Brand: CURITY

## (undated) DEVICE — DISPOSABLE EQUIPMENT COVER: Brand: LARGE TOWEL DRAPE

## (undated) DEVICE — 3M™ IOBAN™ 2 ANTIMICROBIAL INCISE DRAPE 6650EZ: Brand: IOBAN™ 2

## (undated) DEVICE — SUT VICRYL PLUS 2-0 CTB-1 27 IN VCPB259H

## (undated) DEVICE — DRAPE SHEET THREE QUARTER

## (undated) DEVICE — LEAD CUTTER: Brand: LEAD CUTTER

## (undated) DEVICE — PANNUS RETENTION SYSTEM

## (undated) DEVICE — Device: Brand: LLD EZ LEAD LOCKING DEVICE

## (undated) DEVICE — 3M™ TEGADERM™ TRANSPARENT FILM DRESSING FRAME STYLE, 1626W, 4 IN X 4-3/4 IN (10 CM X 12 CM), 50/CT 4CT/CASE: Brand: 3M™ TEGADERM™

## (undated) DEVICE — DRESSING AQUACEL AG 3.5 X 6 IN

## (undated) DEVICE — BULB SYRINGE,IRRIGATION WITH PROTECTIVE CAP: Brand: DOVER

## (undated) DEVICE — PERCUTANEOUS ENTRY THINWALL NEEDLE  ONE-PART: Brand: COOK

## (undated) DEVICE — PLASMABLADE PS200-040 4.0: Brand: PLASMABLADE™

## (undated) DEVICE — IV EXTENSION TUBING 33 IN

## (undated) DEVICE — CARDIOVASCULAR SPLIT DRAPE: Brand: CONVERTORS

## (undated) DEVICE — INTENDED FOR TISSUE SEPARATION, AND OTHER PROCEDURES THAT REQUIRE A SHARP SURGICAL BLADE TO PUNCTURE OR CUT.: Brand: BARD-PARKER ® CARBON RIB-BACK BLADES

## (undated) DEVICE — SUT SILK 0 CT-1 30 IN 424H

## (undated) DEVICE — SUT SILK 2 60 IN SA8H

## (undated) DEVICE — INTRO SHEATH PEEL AWAY 9 FR

## (undated) DEVICE — TRAY FOLEY 16FR SURESTEP TEMP SENS URIMETER STAT LOK

## (undated) DEVICE — RADIFOCUS GLIDEWIRE: Brand: GLIDEWIRE

## (undated) DEVICE — MEDI-VAC YANK SUCT HNDL W/TPRD BULBOUS TIP: Brand: CARDINAL HEALTH

## (undated) DEVICE — SNAP KOVER: Brand: UNBRANDED

## (undated) DEVICE — CLAMP 28MM F/SM FRAG

## (undated) DEVICE — DEFIB ADULT ELECTRODE CARDINAL

## (undated) DEVICE — ELECTRODE EZ CLEAN BLADE -0012

## (undated) DEVICE — PENCIL ELECTROSURG E-Z CLEAN -0035H

## (undated) DEVICE — CHLORAPREP HI-LITE 26ML ORANGE

## (undated) DEVICE — 3M™ STERI-STRIP™ REINFORCED ADHESIVE SKIN CLOSURES, R1547, 1/2 IN X 4 IN (12 MM X 100 MM), 6 STRIPS/ENVELOPE: Brand: 3M™ STERI-STRIP™

## (undated) DEVICE — SCD SEQUENTIAL COMPRESSION COMFORT SLEEVE LARGE KNEE LENGTH: Brand: KENDALL SCD

## (undated) DEVICE — ANTIBACTERIAL UNDYED BRAIDED (POLYGLACTIN 910), SYNTHETIC ABSORBABLE SUTURE: Brand: COATED VICRYL

## (undated) DEVICE — GLOVE SRG BIOGEL ECLIPSE 8

## (undated) DEVICE — PLUMEPEN PRO 10FT

## (undated) DEVICE — PAD GROUNDING ADULT

## (undated) DEVICE — 3M™ IOBAN™ 2 ANTIMICROBIAL INCISE DRAPE 6651EZ: Brand: IOBAN™ 2